# Patient Record
Sex: FEMALE | Race: WHITE | Employment: UNEMPLOYED | URBAN - METROPOLITAN AREA
[De-identification: names, ages, dates, MRNs, and addresses within clinical notes are randomized per-mention and may not be internally consistent; named-entity substitution may affect disease eponyms.]

---

## 2017-06-22 ENCOUNTER — OFFICE VISIT (OUTPATIENT)
Dept: PRIMARY CARE CLINIC | Age: 72
End: 2017-06-22

## 2017-06-22 VITALS
HEART RATE: 72 BPM | RESPIRATION RATE: 16 BRPM | OXYGEN SATURATION: 97 % | DIASTOLIC BLOOD PRESSURE: 90 MMHG | TEMPERATURE: 98.3 F | WEIGHT: 191 LBS | SYSTOLIC BLOOD PRESSURE: 150 MMHG | HEIGHT: 63 IN | BODY MASS INDEX: 33.84 KG/M2

## 2017-06-22 DIAGNOSIS — Z12.31 ENCOUNTER FOR SCREENING MAMMOGRAM FOR HIGH-RISK PATIENT: ICD-10-CM

## 2017-06-22 DIAGNOSIS — Z12.11 SCREEN FOR COLON CANCER: ICD-10-CM

## 2017-06-22 DIAGNOSIS — Z00.00 PHYSICAL EXAM: ICD-10-CM

## 2017-06-22 DIAGNOSIS — M81.0 OSTEOPOROSIS: ICD-10-CM

## 2017-06-22 DIAGNOSIS — M48.00 SPINAL STENOSIS, UNSPECIFIED SPINAL REGION: Primary | ICD-10-CM

## 2017-06-22 LAB
A/G RATIO: 2 (ref 1.1–2.2)
ALBUMIN SERPL-MCNC: 4.9 G/DL (ref 3.4–5)
ALP BLD-CCNC: 77 U/L (ref 40–129)
ALT SERPL-CCNC: 17 U/L (ref 10–40)
ANION GAP SERPL CALCULATED.3IONS-SCNC: 16 MMOL/L (ref 3–16)
AST SERPL-CCNC: 18 U/L (ref 15–37)
BASOPHILS ABSOLUTE: 0 K/UL (ref 0–0.2)
BASOPHILS RELATIVE PERCENT: 0.5 %
BILIRUB SERPL-MCNC: 0.5 MG/DL (ref 0–1)
BILIRUBIN URINE: NEGATIVE
BLOOD, URINE: NEGATIVE
BUN BLDV-MCNC: 17 MG/DL (ref 7–20)
CALCIUM SERPL-MCNC: 10.2 MG/DL (ref 8.3–10.6)
CHLORIDE BLD-SCNC: 97 MMOL/L (ref 99–110)
CHOLESTEROL, TOTAL: 200 MG/DL (ref 0–199)
CLARITY: CLEAR
CO2: 26 MMOL/L (ref 21–32)
COLOR: YELLOW
CREAT SERPL-MCNC: 0.5 MG/DL (ref 0.6–1.2)
EOSINOPHILS ABSOLUTE: 0.3 K/UL (ref 0–0.6)
EOSINOPHILS RELATIVE PERCENT: 4.7 %
GFR AFRICAN AMERICAN: >60
GFR NON-AFRICAN AMERICAN: >60
GLOBULIN: 2.5 G/DL
GLUCOSE BLD-MCNC: 90 MG/DL (ref 70–99)
GLUCOSE URINE: NEGATIVE MG/DL
HCT VFR BLD CALC: 41.6 % (ref 36–48)
HDLC SERPL-MCNC: 51 MG/DL (ref 40–60)
HEMOGLOBIN: 13.4 G/DL (ref 12–16)
HEPATITIS C ANTIBODY INTERPRETATION: NORMAL
KETONES, URINE: NEGATIVE MG/DL
LDL CHOLESTEROL CALCULATED: 122 MG/DL
LEUKOCYTE ESTERASE, URINE: NEGATIVE
LYMPHOCYTES ABSOLUTE: 1.7 K/UL (ref 1–5.1)
LYMPHOCYTES RELATIVE PERCENT: 26.3 %
MCH RBC QN AUTO: 29.3 PG (ref 26–34)
MCHC RBC AUTO-ENTMCNC: 32.2 G/DL (ref 31–36)
MCV RBC AUTO: 90.9 FL (ref 80–100)
MICROSCOPIC EXAMINATION: NORMAL
MONOCYTES ABSOLUTE: 0.4 K/UL (ref 0–1.3)
MONOCYTES RELATIVE PERCENT: 7 %
NEUTROPHILS ABSOLUTE: 3.9 K/UL (ref 1.7–7.7)
NEUTROPHILS RELATIVE PERCENT: 61.5 %
NITRITE, URINE: NEGATIVE
PDW BLD-RTO: 14 % (ref 12.4–15.4)
PH UA: 6.5
PLATELET # BLD: 276 K/UL (ref 135–450)
PMV BLD AUTO: 8.3 FL (ref 5–10.5)
POTASSIUM SERPL-SCNC: 5.2 MMOL/L (ref 3.5–5.1)
PROTEIN UA: NEGATIVE MG/DL
RBC # BLD: 4.57 M/UL (ref 4–5.2)
SODIUM BLD-SCNC: 139 MMOL/L (ref 136–145)
SPECIFIC GRAVITY UA: 1.01
TOTAL PROTEIN: 7.4 G/DL (ref 6.4–8.2)
TRIGL SERPL-MCNC: 135 MG/DL (ref 0–150)
TSH SERPL DL<=0.05 MIU/L-ACNC: 1.78 UIU/ML (ref 0.27–4.2)
URINE TYPE: NORMAL
UROBILINOGEN, URINE: 0.2 E.U./DL
VLDLC SERPL CALC-MCNC: 27 MG/DL
WBC # BLD: 6.4 K/UL (ref 4–11)

## 2017-06-22 PROCEDURE — 99214 OFFICE O/P EST MOD 30 MIN: CPT | Performed by: INTERNAL MEDICINE

## 2017-06-22 RX ORDER — TRAMADOL HYDROCHLORIDE 50 MG/1
50 TABLET ORAL EVERY 6 HOURS PRN
Qty: 20 TABLET | Refills: 0 | Status: SHIPPED | OUTPATIENT
Start: 2017-06-22

## 2017-06-22 ASSESSMENT — ENCOUNTER SYMPTOMS
GASTROINTESTINAL NEGATIVE: 1
BACK PAIN: 1
SHORTNESS OF BREATH: 0
CHEST TIGHTNESS: 0
WHEEZING: 0
EYES NEGATIVE: 1
ABDOMINAL PAIN: 0
COUGH: 0
ABDOMINAL DISTENTION: 0

## 2017-06-22 ASSESSMENT — PATIENT HEALTH QUESTIONNAIRE - PHQ9
2. FEELING DOWN, DEPRESSED OR HOPELESS: 0
SUM OF ALL RESPONSES TO PHQ QUESTIONS 1-9: 0
SUM OF ALL RESPONSES TO PHQ9 QUESTIONS 1 & 2: 0
1. LITTLE INTEREST OR PLEASURE IN DOING THINGS: 0

## 2017-06-23 LAB
ESTIMATED AVERAGE GLUCOSE: 111.2 MG/DL
HBA1C MFR BLD: 5.5 %

## 2017-08-04 DIAGNOSIS — Z12.11 SCREEN FOR COLON CANCER: ICD-10-CM

## 2017-08-04 LAB
CONTROL: NORMAL
HEMOCCULT STL QL: NEGATIVE

## 2017-08-04 PROCEDURE — 82274 ASSAY TEST FOR BLOOD FECAL: CPT | Performed by: INTERNAL MEDICINE

## 2017-12-13 ENCOUNTER — TELEPHONE (OUTPATIENT)
Dept: OTHER | Facility: CLINIC | Age: 72
End: 2017-12-13

## 2025-05-19 ENCOUNTER — HOSPITAL ENCOUNTER (INPATIENT)
Age: 80
LOS: 11 days | Discharge: NURSING, REHAB OR LONG-TERM CARE | End: 2025-05-30
Attending: EMERGENCY MEDICINE | Admitting: HOSPITALIST
Payer: MEDICARE

## 2025-05-19 ENCOUNTER — APPOINTMENT (OUTPATIENT)
Dept: CT IMAGING | Age: 80
End: 2025-05-19
Attending: INTERNAL MEDICINE
Payer: MEDICARE

## 2025-05-19 DIAGNOSIS — S16.1XXA ACUTE CERVICAL MYOFASCIAL STRAIN, INITIAL ENCOUNTER: ICD-10-CM

## 2025-05-19 DIAGNOSIS — I26.99 BILATERAL PULMONARY EMBOLISM (HCC): ICD-10-CM

## 2025-05-19 DIAGNOSIS — L98.429 SKIN ULCER OF SACRUM, UNSPECIFIED ULCER STAGE (HCC): ICD-10-CM

## 2025-05-19 DIAGNOSIS — R60.0 BILATERAL LEG EDEMA: ICD-10-CM

## 2025-05-19 DIAGNOSIS — I26.99 PULMONARY EMBOLISM WITHOUT ACUTE COR PULMONALE, UNSPECIFIED CHRONICITY, UNSPECIFIED PULMONARY EMBOLISM TYPE (HCC): ICD-10-CM

## 2025-05-19 DIAGNOSIS — I48.91 ATRIAL FIBRILLATION, UNSPECIFIED TYPE (HCC): ICD-10-CM

## 2025-05-19 DIAGNOSIS — S00.93XA CONTUSION OF HEAD, UNSPECIFIED PART OF HEAD, INITIAL ENCOUNTER: ICD-10-CM

## 2025-05-19 DIAGNOSIS — I48.91 ATRIAL FIBRILLATION WITH RVR (HCC): ICD-10-CM

## 2025-05-19 DIAGNOSIS — L89.159 PRESSURE INJURY OF SKIN OF SACRAL REGION, UNSPECIFIED INJURY STAGE: ICD-10-CM

## 2025-05-19 DIAGNOSIS — L03.319 CELLULITIS OF SACRAL REGION: Primary | ICD-10-CM

## 2025-05-19 DIAGNOSIS — E87.1 HYPONATREMIA: ICD-10-CM

## 2025-05-19 DIAGNOSIS — S32.000A COMPRESSION FRACTURE OF LUMBAR VERTEBRA, UNSPECIFIED LUMBAR VERTEBRAL LEVEL, INITIAL ENCOUNTER (HCC): ICD-10-CM

## 2025-05-19 DIAGNOSIS — I48.0 PAROXYSMAL ATRIAL FIBRILLATION (HCC): ICD-10-CM

## 2025-05-19 PROBLEM — S31.000A SACRAL WOUND, INITIAL ENCOUNTER: Status: ACTIVE | Noted: 2025-05-19

## 2025-05-19 LAB
ALBUMIN SERPL-MCNC: 3.2 G/DL (ref 3.4–5)
ALBUMIN/GLOB SERPL: 1.3 {RATIO} (ref 1.1–2.2)
ALP SERPL-CCNC: 80 U/L (ref 40–129)
ALT SERPL-CCNC: 6 U/L (ref 10–40)
ANION GAP SERPL CALCULATED.3IONS-SCNC: 11 MMOL/L (ref 3–16)
ANION GAP SERPL CALCULATED.3IONS-SCNC: 13 MMOL/L (ref 3–16)
ANTI-XA UNFRAC HEPARIN: <0.1 IU/ML (ref 0.3–0.7)
AST SERPL-CCNC: 14 U/L (ref 15–37)
BASE EXCESS BLDV CALC-SCNC: 0.7 MMOL/L
BASOPHILS # BLD: 0 K/UL (ref 0–0.2)
BASOPHILS NFR BLD: 0.1 %
BILIRUB SERPL-MCNC: 0.3 MG/DL (ref 0–1)
BILIRUB UR QL STRIP.AUTO: NEGATIVE
BUN SERPL-MCNC: 18 MG/DL (ref 7–20)
BUN SERPL-MCNC: 21 MG/DL (ref 7–20)
CALCIUM SERPL-MCNC: 9 MG/DL (ref 8.3–10.6)
CALCIUM SERPL-MCNC: 9.5 MG/DL (ref 8.3–10.6)
CHLORIDE SERPL-SCNC: 90 MMOL/L (ref 99–110)
CHLORIDE SERPL-SCNC: 92 MMOL/L (ref 99–110)
CK SERPL-CCNC: 64 U/L (ref 26–192)
CLARITY UR: CLEAR
CO2 BLDV-SCNC: 27 MMOL/L
CO2 SERPL-SCNC: 23 MMOL/L (ref 21–32)
CO2 SERPL-SCNC: 26 MMOL/L (ref 21–32)
COHGB MFR BLDV: 1.6 %
COLOR UR: YELLOW
CREAT SERPL-MCNC: 0.5 MG/DL (ref 0.6–1.2)
CREAT SERPL-MCNC: 0.5 MG/DL (ref 0.6–1.2)
DEPRECATED RDW RBC AUTO: 12.3 % (ref 12.4–15.4)
EKG DIAGNOSIS: NORMAL
EKG Q-T INTERVAL: 372 MS
EKG QRS DURATION: 78 MS
EKG QTC CALCULATION (BAZETT): 452 MS
EKG R AXIS: 57 DEGREES
EKG T AXIS: 69 DEGREES
EKG VENTRICULAR RATE: 89 BPM
EOSINOPHIL # BLD: 0 K/UL (ref 0–0.6)
EOSINOPHIL NFR BLD: 0.2 %
GFR SERPLBLD CREATININE-BSD FMLA CKD-EPI: >90 ML/MIN/{1.73_M2}
GFR SERPLBLD CREATININE-BSD FMLA CKD-EPI: >90 ML/MIN/{1.73_M2}
GLUCOSE SERPL-MCNC: 108 MG/DL (ref 70–99)
GLUCOSE SERPL-MCNC: 90 MG/DL (ref 70–99)
GLUCOSE UR STRIP.AUTO-MCNC: NEGATIVE MG/DL
HCO3 BLDV-SCNC: 26 MMOL/L (ref 23–29)
HCT VFR BLD AUTO: 29.1 % (ref 36–48)
HGB BLD-MCNC: 10.1 G/DL (ref 12–16)
HGB UR QL STRIP.AUTO: NEGATIVE
KETONES UR STRIP.AUTO-MCNC: NEGATIVE MG/DL
LACTATE BLDV-SCNC: 1 MMOL/L (ref 0.4–1.9)
LEUKOCYTE ESTERASE UR QL STRIP.AUTO: NEGATIVE
LIPASE SERPL-CCNC: 10 U/L (ref 13–60)
LYMPHOCYTES # BLD: 0.5 K/UL (ref 1–5.1)
LYMPHOCYTES NFR BLD: 4.4 %
MCH RBC QN AUTO: 33.4 PG (ref 26–34)
MCHC RBC AUTO-ENTMCNC: 34.6 G/DL (ref 31–36)
MCV RBC AUTO: 96.4 FL (ref 80–100)
METHGB MFR BLDV: 0.4 %
MONOCYTES # BLD: 0.4 K/UL (ref 0–1.3)
MONOCYTES NFR BLD: 3.9 %
NEUTROPHILS # BLD: 10.4 K/UL (ref 1.7–7.7)
NEUTROPHILS NFR BLD: 91.4 %
NITRITE UR QL STRIP.AUTO: NEGATIVE
O2 THERAPY: NORMAL
OSMOLALITY UR: 419 MOSM/KG (ref 390–1070)
PCO2 BLDV: 42.5 MMHG (ref 40–50)
PH BLDV: 7.39 [PH] (ref 7.35–7.45)
PH UR STRIP.AUTO: 6 [PH] (ref 5–8)
PLATELET # BLD AUTO: 313 K/UL (ref 135–450)
PMV BLD AUTO: 7.3 FL (ref 5–10.5)
PO2 BLDV: 42 MMHG
POTASSIUM SERPL-SCNC: 3.2 MMOL/L (ref 3.5–5.1)
POTASSIUM SERPL-SCNC: 3.6 MMOL/L (ref 3.5–5.1)
PROT SERPL-MCNC: 5.7 G/DL (ref 6.4–8.2)
PROT UR STRIP.AUTO-MCNC: NEGATIVE MG/DL
RBC # BLD AUTO: 3.02 M/UL (ref 4–5.2)
SAO2 % BLDV: 75 %
SODIUM SERPL-SCNC: 124 MMOL/L (ref 136–145)
SODIUM SERPL-SCNC: 131 MMOL/L (ref 136–145)
SODIUM UR-SCNC: 42 MMOL/L
SP GR UR STRIP.AUTO: 1.02 (ref 1–1.03)
TROPONIN, HIGH SENSITIVITY: 22 NG/L (ref 0–14)
TROPONIN, HIGH SENSITIVITY: 23 NG/L (ref 0–14)
UA COMPLETE W REFLEX CULTURE PNL UR: NORMAL
UA DIPSTICK W REFLEX MICRO PNL UR: NORMAL
URATE SERPL-MCNC: 2.8 MG/DL (ref 2.6–6)
URN SPEC COLLECT METH UR: NORMAL
UROBILINOGEN UR STRIP-ACNC: 1 E.U./DL
WBC # BLD AUTO: 11.3 K/UL (ref 4–11)

## 2025-05-19 PROCEDURE — 71260 CT THORAX DX C+: CPT

## 2025-05-19 PROCEDURE — 36415 COLL VENOUS BLD VENIPUNCTURE: CPT

## 2025-05-19 PROCEDURE — 99285 EMERGENCY DEPT VISIT HI MDM: CPT

## 2025-05-19 PROCEDURE — 84550 ASSAY OF BLOOD/URIC ACID: CPT

## 2025-05-19 PROCEDURE — 96365 THER/PROPH/DIAG IV INF INIT: CPT

## 2025-05-19 PROCEDURE — 93010 ELECTROCARDIOGRAM REPORT: CPT | Performed by: INTERNAL MEDICINE

## 2025-05-19 PROCEDURE — 6370000000 HC RX 637 (ALT 250 FOR IP): Performed by: HOSPITALIST

## 2025-05-19 PROCEDURE — 96367 TX/PROPH/DG ADDL SEQ IV INF: CPT

## 2025-05-19 PROCEDURE — 90471 IMMUNIZATION ADMIN: CPT | Performed by: EMERGENCY MEDICINE

## 2025-05-19 PROCEDURE — APPSS15 APP SPLIT SHARED TIME 0-15 MINUTES: Performed by: PHYSICIAN ASSISTANT

## 2025-05-19 PROCEDURE — 83605 ASSAY OF LACTIC ACID: CPT

## 2025-05-19 PROCEDURE — 2580000003 HC RX 258: Performed by: EMERGENCY MEDICINE

## 2025-05-19 PROCEDURE — 74177 CT ABD & PELVIS W/CONTRAST: CPT

## 2025-05-19 PROCEDURE — 82550 ASSAY OF CK (CPK): CPT

## 2025-05-19 PROCEDURE — 83935 ASSAY OF URINE OSMOLALITY: CPT

## 2025-05-19 PROCEDURE — 70450 CT HEAD/BRAIN W/O DYE: CPT

## 2025-05-19 PROCEDURE — 6360000002 HC RX W HCPCS: Performed by: EMERGENCY MEDICINE

## 2025-05-19 PROCEDURE — 81003 URINALYSIS AUTO W/O SCOPE: CPT

## 2025-05-19 PROCEDURE — 85025 COMPLETE CBC W/AUTO DIFF WBC: CPT

## 2025-05-19 PROCEDURE — 96375 TX/PRO/DX INJ NEW DRUG ADDON: CPT

## 2025-05-19 PROCEDURE — 1200000000 HC SEMI PRIVATE

## 2025-05-19 PROCEDURE — 82803 BLOOD GASES ANY COMBINATION: CPT

## 2025-05-19 PROCEDURE — 72125 CT NECK SPINE W/O DYE: CPT

## 2025-05-19 PROCEDURE — 90715 TDAP VACCINE 7 YRS/> IM: CPT | Performed by: EMERGENCY MEDICINE

## 2025-05-19 PROCEDURE — APPNB15 APP NON BILLABLE TIME 0-15 MINS: Performed by: PHYSICIAN ASSISTANT

## 2025-05-19 PROCEDURE — 83690 ASSAY OF LIPASE: CPT

## 2025-05-19 PROCEDURE — 93005 ELECTROCARDIOGRAM TRACING: CPT | Performed by: EMERGENCY MEDICINE

## 2025-05-19 PROCEDURE — 96372 THER/PROPH/DIAG INJ SC/IM: CPT

## 2025-05-19 PROCEDURE — 84484 ASSAY OF TROPONIN QUANT: CPT

## 2025-05-19 PROCEDURE — 80053 COMPREHEN METABOLIC PANEL: CPT

## 2025-05-19 PROCEDURE — 6360000004 HC RX CONTRAST MEDICATION: Performed by: EMERGENCY MEDICINE

## 2025-05-19 PROCEDURE — 2500000003 HC RX 250 WO HCPCS: Performed by: INTERNAL MEDICINE

## 2025-05-19 PROCEDURE — 85520 HEPARIN ASSAY: CPT

## 2025-05-19 PROCEDURE — 84300 ASSAY OF URINE SODIUM: CPT

## 2025-05-19 PROCEDURE — 87040 BLOOD CULTURE FOR BACTERIA: CPT

## 2025-05-19 RX ORDER — FENTANYL CITRATE 50 UG/ML
25 INJECTION, SOLUTION INTRAMUSCULAR; INTRAVENOUS ONCE
Refills: 0 | Status: COMPLETED | OUTPATIENT
Start: 2025-05-19 | End: 2025-05-19

## 2025-05-19 RX ORDER — IOPAMIDOL 755 MG/ML
75 INJECTION, SOLUTION INTRAVASCULAR
Status: COMPLETED | OUTPATIENT
Start: 2025-05-19 | End: 2025-05-19

## 2025-05-19 RX ORDER — POTASSIUM CHLORIDE 7.45 MG/ML
10 INJECTION INTRAVENOUS PRN
Status: DISCONTINUED | OUTPATIENT
Start: 2025-05-19 | End: 2025-05-30 | Stop reason: HOSPADM

## 2025-05-19 RX ORDER — SODIUM CHLORIDE 9 MG/ML
INJECTION, SOLUTION INTRAVENOUS CONTINUOUS
Status: DISCONTINUED | OUTPATIENT
Start: 2025-05-19 | End: 2025-05-19

## 2025-05-19 RX ORDER — SODIUM CHLORIDE 0.9 % (FLUSH) 0.9 %
5-40 SYRINGE (ML) INJECTION PRN
Status: DISCONTINUED | OUTPATIENT
Start: 2025-05-19 | End: 2025-05-27 | Stop reason: SDUPTHER

## 2025-05-19 RX ORDER — HEPARIN SODIUM 1000 [USP'U]/ML
80 INJECTION, SOLUTION INTRAVENOUS; SUBCUTANEOUS PRN
Status: DISCONTINUED | OUTPATIENT
Start: 2025-05-19 | End: 2025-05-19

## 2025-05-19 RX ORDER — MAGNESIUM SULFATE IN WATER 40 MG/ML
2000 INJECTION, SOLUTION INTRAVENOUS PRN
Status: DISCONTINUED | OUTPATIENT
Start: 2025-05-19 | End: 2025-05-30 | Stop reason: HOSPADM

## 2025-05-19 RX ORDER — IBUPROFEN 400 MG/1
400-800 TABLET, FILM COATED ORAL EVERY 6 HOURS PRN
COMMUNITY

## 2025-05-19 RX ORDER — POTASSIUM CHLORIDE 1500 MG/1
40 TABLET, EXTENDED RELEASE ORAL PRN
Status: DISCONTINUED | OUTPATIENT
Start: 2025-05-19 | End: 2025-05-30 | Stop reason: HOSPADM

## 2025-05-19 RX ORDER — HEPARIN SODIUM 10000 [USP'U]/100ML
0-4000 INJECTION, SOLUTION INTRAVENOUS CONTINUOUS
Status: DISCONTINUED | OUTPATIENT
Start: 2025-05-19 | End: 2025-05-20

## 2025-05-19 RX ORDER — HEPARIN SODIUM 10000 [USP'U]/100ML
5-30 INJECTION, SOLUTION INTRAVENOUS CONTINUOUS
Status: DISCONTINUED | OUTPATIENT
Start: 2025-05-19 | End: 2025-05-19

## 2025-05-19 RX ORDER — GABAPENTIN 100 MG/1
100 CAPSULE ORAL 3 TIMES DAILY
COMMUNITY

## 2025-05-19 RX ORDER — HEPARIN SODIUM 1000 [USP'U]/ML
40 INJECTION, SOLUTION INTRAVENOUS; SUBCUTANEOUS PRN
Status: DISCONTINUED | OUTPATIENT
Start: 2025-05-19 | End: 2025-05-19

## 2025-05-19 RX ORDER — SODIUM CHLORIDE 9 MG/ML
INJECTION, SOLUTION INTRAVENOUS PRN
Status: DISCONTINUED | OUTPATIENT
Start: 2025-05-19 | End: 2025-05-27 | Stop reason: SDUPTHER

## 2025-05-19 RX ORDER — ONDANSETRON 2 MG/ML
4 INJECTION INTRAMUSCULAR; INTRAVENOUS ONCE
Status: COMPLETED | OUTPATIENT
Start: 2025-05-19 | End: 2025-05-19

## 2025-05-19 RX ORDER — POLYETHYLENE GLYCOL 3350 17 G/17G
17 POWDER, FOR SOLUTION ORAL DAILY PRN
Status: DISCONTINUED | OUTPATIENT
Start: 2025-05-19 | End: 2025-05-30 | Stop reason: HOSPADM

## 2025-05-19 RX ORDER — HEPARIN SODIUM 1000 [USP'U]/ML
80 INJECTION, SOLUTION INTRAVENOUS; SUBCUTANEOUS ONCE
Status: COMPLETED | OUTPATIENT
Start: 2025-05-19 | End: 2025-05-19

## 2025-05-19 RX ORDER — OXYCODONE AND ACETAMINOPHEN 5; 325 MG/1; MG/1
1 TABLET ORAL EVERY 4 HOURS PRN
Refills: 0 | Status: DISCONTINUED | OUTPATIENT
Start: 2025-05-19 | End: 2025-05-24 | Stop reason: SDUPTHER

## 2025-05-19 RX ORDER — GABAPENTIN 100 MG/1
100 CAPSULE ORAL 3 TIMES DAILY
Status: DISCONTINUED | OUTPATIENT
Start: 2025-05-19 | End: 2025-05-30 | Stop reason: HOSPADM

## 2025-05-19 RX ORDER — ONDANSETRON 2 MG/ML
4 INJECTION INTRAMUSCULAR; INTRAVENOUS EVERY 6 HOURS PRN
Status: DISCONTINUED | OUTPATIENT
Start: 2025-05-19 | End: 2025-05-30 | Stop reason: HOSPADM

## 2025-05-19 RX ORDER — ONDANSETRON 4 MG/1
4 TABLET, ORALLY DISINTEGRATING ORAL EVERY 8 HOURS PRN
Status: DISCONTINUED | OUTPATIENT
Start: 2025-05-19 | End: 2025-05-30 | Stop reason: HOSPADM

## 2025-05-19 RX ORDER — ACETAMINOPHEN 325 MG/1
650 TABLET ORAL EVERY 6 HOURS PRN
Status: DISCONTINUED | OUTPATIENT
Start: 2025-05-19 | End: 2025-05-24

## 2025-05-19 RX ORDER — ACETAMINOPHEN 650 MG/1
650 SUPPOSITORY RECTAL EVERY 6 HOURS PRN
Status: DISCONTINUED | OUTPATIENT
Start: 2025-05-19 | End: 2025-05-24

## 2025-05-19 RX ORDER — DEXTROSE MONOHYDRATE, SODIUM CHLORIDE, AND POTASSIUM CHLORIDE 50; 1.49; 4.5 G/1000ML; G/1000ML; G/1000ML
INJECTION, SOLUTION INTRAVENOUS CONTINUOUS
Status: DISCONTINUED | OUTPATIENT
Start: 2025-05-19 | End: 2025-05-20

## 2025-05-19 RX ORDER — SODIUM CHLORIDE 0.9 % (FLUSH) 0.9 %
5-40 SYRINGE (ML) INJECTION EVERY 12 HOURS SCHEDULED
Status: DISCONTINUED | OUTPATIENT
Start: 2025-05-19 | End: 2025-05-27 | Stop reason: SDUPTHER

## 2025-05-19 RX ORDER — ENOXAPARIN SODIUM 100 MG/ML
40 INJECTION SUBCUTANEOUS DAILY
Status: DISCONTINUED | OUTPATIENT
Start: 2025-05-19 | End: 2025-05-19

## 2025-05-19 RX ADMIN — DEXTROSE, SODIUM CHLORIDE, AND POTASSIUM CHLORIDE: 5; .45; .15 INJECTION INTRAVENOUS at 17:52

## 2025-05-19 RX ADMIN — GABAPENTIN 100 MG: 100 CAPSULE ORAL at 17:26

## 2025-05-19 RX ADMIN — CEFEPIME 2000 MG: 2 INJECTION, POWDER, FOR SOLUTION INTRAVENOUS at 11:48

## 2025-05-19 RX ADMIN — HEPARIN SODIUM 5300 UNITS: 1000 INJECTION INTRAVENOUS; SUBCUTANEOUS at 17:24

## 2025-05-19 RX ADMIN — IOPAMIDOL 75 ML: 755 INJECTION, SOLUTION INTRAVENOUS at 13:06

## 2025-05-19 RX ADMIN — HEPARIN SODIUM AND DEXTROSE 1200 UNITS/HR: 10000; 5 INJECTION INTRAVENOUS at 17:24

## 2025-05-19 RX ADMIN — OXYCODONE AND ACETAMINOPHEN 1 TABLET: 5; 325 TABLET ORAL at 17:48

## 2025-05-19 RX ADMIN — TETANUS TOXOID, REDUCED DIPHTHERIA TOXOID AND ACELLULAR PERTUSSIS VACCINE, ADSORBED 0.5 ML: 5; 2.5; 8; 8; 2.5 SUSPENSION INTRAMUSCULAR at 11:42

## 2025-05-19 RX ADMIN — POTASSIUM CHLORIDE 40 MEQ: 1500 TABLET, EXTENDED RELEASE ORAL at 18:45

## 2025-05-19 RX ADMIN — SODIUM CHLORIDE: 0.9 INJECTION, SOLUTION INTRAVENOUS at 13:13

## 2025-05-19 RX ADMIN — VANCOMYCIN HYDROCHLORIDE 1000 MG: 1 INJECTION, POWDER, LYOPHILIZED, FOR SOLUTION INTRAVENOUS at 13:08

## 2025-05-19 RX ADMIN — FENTANYL CITRATE 25 MCG: 50 INJECTION INTRAMUSCULAR; INTRAVENOUS at 11:45

## 2025-05-19 RX ADMIN — ONDANSETRON 4 MG: 2 INJECTION, SOLUTION INTRAMUSCULAR; INTRAVENOUS at 11:45

## 2025-05-19 ASSESSMENT — PAIN SCALES - GENERAL
PAINLEVEL_OUTOF10: 10

## 2025-05-19 ASSESSMENT — PAIN DESCRIPTION - LOCATION: LOCATION: LEG

## 2025-05-19 ASSESSMENT — PAIN DESCRIPTION - ORIENTATION: ORIENTATION: RIGHT;LEFT

## 2025-05-19 NOTE — PROGRESS NOTES
Medication Reconciliation    List of medications patient is currently taking is complete.     Source of information: 1. Conversation with patient/family at bedside                                      2. EPIC records     Notes regarding home medications:   1. Patient's only routine maintenance medication is Gabapentin 100 mg po TID.  Patient takes Ibuprofen + Acetaminophen as needed.    Jermaine De Santiago RPH, PharmD, BCPS  5/19/2025 2:30 PM

## 2025-05-19 NOTE — CONSULTS
[facility]    Radha Carcamo  1945    May 19, 2025    Reason for Consult: Pulmonary Embolus    CC:    HPI:  The patient is 79 y.o. female with a past medical history significant for spinal stenosis who presented to [facility] with a sacral decubitus ulcer . I was asked to see her for the incidental finding of pulmonary emboli on her noncontrast chest CT.       Review of Systems:  Constitutional: No fatigue, weakness, night sweats or fever.   HEENT: No new vision difficulties or ringing in the ears.  Respiratory: No new SOB, PND, orthopnea or cough.   Cardiovascular: See HPI   GI: No n/v, diarrhea, constipation, abdominal pain or changes in bowel habits.  No melena, no hematochezia  : No urinary frequency, urgency, incontinence, hematuria or dysuria.  Skin: No cyanosis or skin lesions.  Musculoskeletal: No new muscle or joint pain.  Neurological: No syncope or TIA-like symptoms.  Psychiatric: No anxiety, insomnia or depression     Past Medical History:   Diagnosis Date    Spinal stenosis      Past Surgical History:   Procedure Laterality Date    EYE SURGERY      cataract ou     Family History   Problem Relation Age of Onset    Cancer Mother     Cancer Father      Social History     Tobacco Use    Smoking status: Former     Current packs/day: 1.00     Average packs/day: 1 pack/day for 10.0 years (10.0 ttl pk-yrs)     Types: Cigarettes    Smokeless tobacco: Never   Substance Use Topics    Alcohol use: Yes     Comment: occ    Drug use: No       Allergies   Allergen Reactions    Codeine Nausea And Vomiting     Current Facility-Administered Medications   Medication Dose Route Frequency Provider Last Rate Last Admin    0.9 % sodium chloride infusion   IntraVENous Continuous Yeison Morocho  mL/hr at 05/19/25 1313 New Bag at 05/19/25 1313    gabapentin (NEURONTIN) capsule 100 mg  100 mg Oral TID Karan Radford MD        heparin (porcine) injection 5,300 Units  80 Units/kg

## 2025-05-19 NOTE — PROGRESS NOTES
Clinical Pharmacy Note  Heparin Dosing Consult    Radha Carcamo is a 79 y.o. female ordered heparin per VTE/DVT/PE Nomogram by Dr. Morocho.     No results found for: \"ANTIXAUHEP\", \"LABHEPA\"   Lab Results   Component Value Date/Time    HGB 10.1 05/19/2025 11:35 AM    HCT 29.1 05/19/2025 11:35 AM     05/19/2025 11:35 AM       Ht Readings from Last 1 Encounters:   05/19/25 1.6 m (5' 2.99\")        Wt Readings from Last 1 Encounters:   05/19/25 65.9 kg (145 lb 4.5 oz)        Assessment/Plan:  Initial bolus: 5300 units  Initial infusion rate: 1200 units/hr  Next anti-Xa: 2200    Pharmacy will continue to monitor adjust heparin based on anti-Xa results using nomogram below:     VTE/DVT/PE Heparin Nomogram     Initial Bolus: 80 units/kg Max Bolus: 10,000 units       Initial Rate: 18 units/kg/hr Max Initial Rate: 2,100 units/hr     anti-Xa Bolus Titration   < 0.1 Heparin 80 units/kg bolus Increase drip by 4 units/kg/hr   0.1 - 0.29 Heparin 40 units/kg bolus Increase drip by 2 units/kg/hr   0.3 - 0.7 No Bolus No Change   0.71 - 0.8 No Bolus Decrease drip by 1 units/kg/hr   0.81 - 0.99 No Bolus Decrease drip by 2 units/kg/hr   > 1 Hold Heparin for 1 hour Decrease drip by 3 units/kg/hr       Obtain anti-Xa 6 hours after initial bolus and 6 hours after any dose change until two consecutive therapeutic anti-Xa levels are achieved - then daily.

## 2025-05-19 NOTE — ED PROVIDER NOTES
[facility] EMERGENCY DEPARTMENT  EMERGENCY DEPARTMENT ENCOUNTER      Pt Name: Radha Carcamo  MRN: [id number]  Birthdate 1945  Date of evaluation: 5/19/2025  Provider: MAREK ARREGUIN DO    CHIEF COMPLAINT       Chief Complaint   Patient presents with    Leg Pain     Patient to ER via EMS, she states she is her today because her boyfriend is no longer able to care for her. Bilat leg pain and swelling since November.          HISTORY OF PRESENT ILLNESS   (Location/Symptom, Timing/Onset, Context/Setting, Quality, Duration, Modifying Factors, Severity)  Note limiting factors.   Radha Carcamo is a 79 y.o. female who presents to the emergency department with a complaint that she is having severe pain in her presacral area.  She states that she has a \"sore\" that has been there for a while but has become more painful.  She reports drainage and odor from the site.    She reports that she lives at home alone with her boyfriend who is her caregiver.  She states that she has not been able to walk for approximately 6 months.  She is incontinent of urine at baseline and wears a depends.  She states that she has become increasingly generally weak over the last few months and her boyfriend is unable to care for her.  She has had several falls within the last week and he was unable to get her up off of the floor.  She had a fall last night in which she fell.  She did hit her head and sustained an abrasion to the occipital scalp.  She complains of some neck and some upper back pain.  She also complains of pain at the sacral decubitus site.    She also complains of some bilateral lower extremity edema which has gradually worsened.  Appetite has been poor.  She states that she has not been eating or drinking much.    She denies any chest pain heaviness pressure or tightness.  No diaphoresis.    She denies any abdominal pain nausea vomiting or diarrhea.  She denies any dysuria hematuria frequency

## 2025-05-19 NOTE — PROGRESS NOTES
4 Eyes Skin Assessment     NAME:  Radha Carcamo  YOB: 1945  MEDICAL RECORD NUMBER:  [id number]    The patient is being assessed for  Admission    I agree that at least one RN has performed a thorough Head to Toe Skin Assessment on the patient. ALL assessment sites listed below have been assessed.      Areas assessed by both nurses:    Head, Face, Ears, Shoulders, Back, Chest, Arms, Elbows, Hands, Sacrum. Buttock, Coccyx, Ischium, and Legs. Feet and Heels        Does the Patient have a Wound? Yes wound(s) were present on assessment. LDA wound assessment was Initiated and completed by RN       Dao Prevention initiated by RN: Yes  Wound Care Orders initiated by RN: Yes    Pressure Injury (Stage 3,4, Unstageable, DTI, NWPT, and Complex wounds) if present, place Wound referral order by RN under : Yes    New Ostomies, if present place, Ostomy referral order under : No     Nurse 1 eSignature: Electronically signed by Sharon Galvez RN on 5/19/25 at 6:25 PM EDT    **SHARE this note so that the co-signing nurse can place an eSignature**    Nurse 2 eSignature: Electronically signed by Trinity Millan RN on 5/19/25 at 6:41 PM EDT

## 2025-05-19 NOTE — CONSULTS
Patient ID: Radha Carcamo  Referring/ Physician: Karan Radford MD      Summary:   Radha Carcamo is being seen by nephrology for hyponatremia .     Reason for admission: sacral wound       Interval Hx:     Na 124 > 131 after fluids.   Uric acid 2.8     Assessment/Plan:   - change fluids to d5 1/2 NS 20 kcl at 75 cc/hr   - replace potassium   - urine sodium ordered, urine osm.   - strict IO.   - goal sodium should be around 130 today       Hyponatremia   Presenting with sodium of 124  Received IV fluids and sodium jacob to 131  Goal correction 4-6 meq in 24 hrs  Replace potassium     Hypokalemia   Replacing as needed.       Sacral ulcer  On antibiotics.         [facility] would like to thank you for the opportunity to serve this patient. Please call with any questions or concerns.    Hawa Curiel MD  [facility]  [address] [phone]  Fax: [phone]  Office: [phone]    [facility]  Radha Carcamo is a 79 y.o. female  with  has a past medical history of Spinal stenosis. who presented with sacral decubitus ulcer. .was found to have incidental finding of PE on chest CT. She was started on antibiotics and received IV fluids. Placed on heparin gtt. Nephrology consulted for hyponatremia with admitted sodium level of 124.      Review of Systems:   As above.     PMH/SH/FH:    Medical Hx: reviewed and updated as appropriate  Past Medical History:   Diagnosis Date    Spinal stenosis          Surgical Hx: reviewed and updated as appropriate   has a past surgical history that includes eye surgery.     Social Hx: reviewed and updated as appropriate  Social History     Tobacco Use    Smoking status: Former     Current packs/day: 1.00     Average packs/day: 1 pack/day for 10.0 years (10.0 ttl pk-yrs)     Types: Cigarettes    Smokeless tobacco: Never   Substance Use Topics    Alcohol use: Yes     Comment: occ        Family hx: reviewed and updated as

## 2025-05-19 NOTE — ACP (ADVANCE CARE PLANNING)
Advanced Care Planning Note.    Purpose of Encounter: Advanced care planning in light of acute and chronic deteriorating medical conditions.    Parties In Attendance: Patient (Radha Carcamo),   (POA), Karan Radford MD  (myself)    Decisional Capacity: Yes    Subjective: Patient/family understand in this voluntary conversation that Radha Carcamo continues to deteriorate and discuss what inteventions and plans patient would want implemented in light of the following diagnoses:      Acute PE  Decubitus ulcer     Objective: Pt has been having chronic decline in functional status with increased dependence on others for ADLs  Increased frequency of Hospitalizations.  Likelihood of further hospitalizations with likelihood of escalation of medical interventions with the expectation of returning to a diminishing baseline level of functionality.      Discussion highlights: I discussed with patient the ramifications of their acute and chronic medical problems- and the likely outcomes expected, both in terms of statistics, and as part of my experience seeing patients with similar conditions.      Goals of Care Determination:  Patient wishes to remain Full code for now, but has interest in continuing this conversation, and discussing with family before making any changes to code status and goals of care parameters.      Plan: Continue to educate patient on medical conditions and the choices available regarding possible outcomes with regard to goals of care and code status.         Time spent on Advanced care Plannin minutes    Karan Radford MD  2025 3:06 PM

## 2025-05-19 NOTE — CARE COORDINATION
Wound care consulted for sacral wound. Reviewed chart and imaging. Pt with unstageable wound to sacral area. General surgery consulted. Please defer to general surgery for all wound care orders and management. Will not continue to follow. Please re-consult if needed.   Electronically signed by Gabriela Barreto RN CWOCN on 5/19/2025 at 4:00 PM

## 2025-05-19 NOTE — CONSULTS
Surgery Consult Note     Addi Padron PA-C  Pt Name: Radha Carcamo  MRN: [id number]  YOB: 1945  Date of evaluation: 5/19/2025  Primary Care Physician: Niko Gunderson MD  Referred By: Karan Radford   Reason for Consultation: decubitus ulcer   Chief Complaint: pain in her lower back and leg pain.  IMPRESSIONS:   Unstageable sacral ulcer. +eschar  WBC count 11.3  Pulmonary emboli   PLANS:   Monitor and control pain  Bilateral lower extremity duplex  IVF  IV antibiotics   Keep pressure off ulcer  Apply santyl to ulcer  Will continue to monitor and perform bedside debridements as needed  SUBJECTIVE:   History of Chief Complaint:    Radha Carcamo is a 79 y.o. female who presented with bilateral lower extremity pain. A CT scan was done and that showed Pulmonary emboli are seen in the right lower lobar pulmonary artery extending to segmental branch and in a left upper lobe segmental branch pulmonary artery, and a sacral decubitus ulcer without visualized underlying osteomyelitis. The site have overlying eschar  and a small amount of malodorous drainage. She stated that this ulcer has been present for several months.   Past Medical History  Reviewed  has a past medical history of Spinal stenosis.  Past Surgical History  Reviewed has a past surgical history that includes eye surgery.  Medications  Prior to Admission medications    Medication Sig Start Date End Date Taking? Authorizing Provider   gabapentin (NEURONTIN) 100 MG capsule Take 1 capsule by mouth 3 times daily.   Yes Sindy Nichols MD   ibuprofen (ADVIL;MOTRIN) 400 MG tablet Take 1-2 tablets by mouth every 6 hours as needed for Pain or Fever   Yes Sindy Nichols MD    Scheduled Meds:   [START ON 5/20/2025] cefepime  1,000 mg IntraVENous Q12H    sodium chloride flush  5-40 mL IntraVENous 2 times per day    gabapentin  100 mg Oral TID    heparin (porcine)  80 Units/kg IntraVENous Once    [START ON 5/20/2025] vancomycin  1,500

## 2025-05-19 NOTE — ED NOTES
EMS stated patient fell yesterday, patient states she has not fallen in months. Patient Aox4 but impulsive and requires some redirection.

## 2025-05-19 NOTE — ED TRIAGE NOTES
Patient to ER via EMS, she states she is her today because her boyfriend is no longer able to care for her. Bilat leg pain and swelling since November.

## 2025-05-19 NOTE — PROGRESS NOTES
Patient arrivedto unit alert and oriented vitals stable. Patient transferred over to bed from [facility] with complaints of pain. Orders released, order for food placed with dietary. Patient oriented to room and provided call light.

## 2025-05-19 NOTE — H&P
V2.0  History and Physical      Name:  Radah Carcamo /Age/Sex: 1945  (79 y.o. female)   MRN & CSN:  [id number] & [id number] Encounter Date/Time: 2025 2:21 PM EDT   Location:   PCP: Niko Gunderson MD       Hospital Day: 1    Assessment and Plan:   Radha Carcamo is a 79 y.o. female with a pmh of  who presents with Sacral wound, initial encounter    Hospital Problems           Last Modified POA    * (Principal) Sacral wound, initial encounter 2025 Yes       Plan:        Admit inpatient  Telemetry monitering  Empiric IV antibiotics : IV vancomycin and cefepime  Pharmacy consult to dose vancomycin  Blood culture x 2  Wound culture   Will consult general surgery for evaluation of sacral decubitus ulcer  Wound care consult    Acute PE :    Acute pulmonary embolism  : Continue heparin drip, monitor PT/INR as per heparin protocol  Check bilateral lower extremity venous Doppler  Cardiology consulted  DuoNebs  Oxygen via nasal cannula as needed to keep oxygen saturation more than 90%       Hyponatremia :  Give IV fluid normal saline for now    Could be 2/2 SIADH vs poor solute intake  Check Arabella, Uosm  Moniter BMP      Replace K with PO and IV KCL per protocol  Moniter K and Mg       PT and OT evaluation      Continue home meds for other chronic conditions    DVT ppx  : lovenox    Diet : regular diet    Code status  : full code                Disposition:   Current Living situation: Home  Expected Disposition:TBD  Estimated D/C: 2- 3 days    Diet No diet orders on file   DVT Prophylaxis [] Lovenox, []  Heparin, [] SCDs, [] Ambulation,  [] Eliquis, [] Xarelto, [] Coumadin   Code Status No Order   Surrogate Decision Maker/ POA            History from:       patient      History of Present Illness:     Chief Complaint:   Radha Carcamo is a 79 y.o. female with pmh of  who presents with concern that her boyfriend is no longer able to care for her she is essentially nonambulatory uses a

## 2025-05-19 NOTE — PROGRESS NOTES
Clinical Pharmacy Note  Vancomycin Consult    Radha Carcamo is a 79 y.o. female ordered vancomycin for SSTI; consult received from Dr. Radford to manage therapy. Also receiving cefepime.    Allergies:  Codeine     Temp max:  Temp (24hrs), Av.4 °F (36.9 °C), Min:98.3 °F (36.8 °C), Max:98.4 °F (36.9 °C)      Recent Labs     25  1135   WBC 11.3*       Recent Labs     25  1135   BUN 21*   CREATININE 0.5*       No intake or output data in the 24 hours ending 25 1553    Culture Results:      Ht Readings from Last 1 Encounters:   25 1.6 m (5' 2.99\")        Wt Readings from Last 1 Encounters:   25 65.9 kg (145 lb 4.5 oz)         Estimated Creatinine Clearance: 85 mL/min (A) (based on SCr of 0.5 mg/dL (L)).    Assessment/Plan:  Day # 1 of vancomycin.  Vancomycin 1500 mg IV every 24 hours.    Goal -600  Predicted       Thank you for the consult.    Electronically signed by Jl Llamas RPH on 2025 at 3:54 PM

## 2025-05-19 NOTE — ED NOTES
ED TO INPATIENT SBAR HANDOFF    Patient Name: Radha Carcamo   Preferred Name: Radha  : 1945  79 y.o.   Family/Caregiver Present: no   Code Status Order: No Order  PO Status: NPO:No  Telemetry Order: Yes  C-SSRS: Risk of Suicide: No Risk  Sitter no     Restraints:     Sepsis Risk Score      Situation  Chief Complaint   Patient presents with    Leg Pain     Patient to ER via EMS, she states she is her today because her boyfriend is no longer able to care for her. Bilat leg pain and swelling since November.      Brief Description of Patient's Condition: From home with boyfriend, unable to ambulate/transfer independently. Chronic leg pain/swelling, fall yesterday. Came to ER stating her boyfriend is unable to care for her at home.  Mental Status: oriented and alert  Arrived from:Home  Imaging:   CT HEAD WO CONTRAST   Final Result   1. No acute intracranial abnormality.   2. Mild periventricular hypoattenuation, consistent with mild chronic small vessel ischemic changes.            CT CERVICAL SPINE WO CONTRAST   Final Result   No acute abnormality of the cervical spine.         CT CHEST ABDOMEN PELVIS W CONTRAST Additional Contrast? None   Preliminary Result   1. Pulmonary emboli are seen in the right lower lobar pulmonary artery   extending to segmental branch and in a left upper lobe segmental branch   pulmonary artery. No convincing evidence of right heart strain.   2. Prominent intrahepatic and extrahepatic bile ducts, likely representing   reservoir effect in the setting of prior cholecystectomy.  Consider   correlation with LFTs.   3. Mild superior endplate deformities of the T4, L2, and L5 vertebral bodies   are favored nonacute. Correlate with clinical symptoms.   4. Sacral decubitus ulcer without visualized underlying osteomyelitis.   5. Moderate body wall edema.   6. Note limited evaluation of the abdomen and pelvis due to motion artifact   despite repeat scan.         CT THORACIC SPINE BONY

## 2025-05-20 ENCOUNTER — ANESTHESIA (OUTPATIENT)
Dept: OPERATING ROOM | Age: 80
End: 2025-05-20
Payer: MEDICARE

## 2025-05-20 ENCOUNTER — HOSPITAL ENCOUNTER (INPATIENT)
Dept: VASCULAR LAB | Age: 80
Discharge: HOME | End: 2025-05-22
Attending: HOSPITALIST
Payer: MEDICARE

## 2025-05-20 ENCOUNTER — ANESTHESIA EVENT (OUTPATIENT)
Dept: OPERATING ROOM | Age: 80
End: 2025-05-20
Payer: MEDICARE

## 2025-05-20 PROBLEM — R60.0 BILATERAL LEG EDEMA: Status: ACTIVE | Noted: 2025-05-20

## 2025-05-20 PROBLEM — S16.1XXA ACUTE CERVICAL MYOFASCIAL STRAIN: Status: ACTIVE | Noted: 2025-05-20

## 2025-05-20 PROBLEM — L03.319 CELLULITIS OF SACRAL REGION: Status: ACTIVE | Noted: 2025-05-20

## 2025-05-20 PROBLEM — L98.429 SKIN ULCER OF SACRUM (HCC): Status: ACTIVE | Noted: 2025-05-20

## 2025-05-20 PROBLEM — L89.159 PRESSURE INJURY OF SKIN OF SACRAL REGION: Status: ACTIVE | Noted: 2025-05-20

## 2025-05-20 PROBLEM — I26.99 BILATERAL PULMONARY EMBOLISM (HCC): Status: ACTIVE | Noted: 2025-05-20

## 2025-05-20 PROBLEM — S32.000A COMPRESSION OF LUMBAR VERTEBRA (HCC): Status: ACTIVE | Noted: 2025-05-20

## 2025-05-20 PROBLEM — E87.1 HYPONATREMIA: Status: ACTIVE | Noted: 2025-05-20

## 2025-05-20 LAB
ANION GAP SERPL CALCULATED.3IONS-SCNC: 6 MMOL/L (ref 3–16)
ANION GAP SERPL CALCULATED.3IONS-SCNC: 6 MMOL/L (ref 3–16)
ANION GAP SERPL CALCULATED.3IONS-SCNC: 7 MMOL/L (ref 3–16)
ANION GAP SERPL CALCULATED.3IONS-SCNC: 7 MMOL/L (ref 3–16)
ANTI-XA UNFRAC HEPARIN: 0.24 IU/ML (ref 0.3–0.7)
ANTI-XA UNFRAC HEPARIN: 0.45 IU/ML (ref 0.3–0.7)
ANTI-XA UNFRAC HEPARIN: <0.1 IU/ML (ref 0.3–0.7)
BASOPHILS # BLD: 0 K/UL (ref 0–0.2)
BASOPHILS NFR BLD: 0.4 %
BUN SERPL-MCNC: 12 MG/DL (ref 7–20)
BUN SERPL-MCNC: 12 MG/DL (ref 7–20)
BUN SERPL-MCNC: 16 MG/DL (ref 7–20)
BUN SERPL-MCNC: 18 MG/DL (ref 7–20)
CALCIUM SERPL-MCNC: 8.6 MG/DL (ref 8.3–10.6)
CALCIUM SERPL-MCNC: 8.7 MG/DL (ref 8.3–10.6)
CHLORIDE SERPL-SCNC: 93 MMOL/L (ref 99–110)
CHLORIDE SERPL-SCNC: 95 MMOL/L (ref 99–110)
CO2 SERPL-SCNC: 24 MMOL/L (ref 21–32)
CREAT SERPL-MCNC: 0.3 MG/DL (ref 0.6–1.2)
CREAT SERPL-MCNC: 0.3 MG/DL (ref 0.6–1.2)
CREAT SERPL-MCNC: 0.4 MG/DL (ref 0.6–1.2)
CREAT SERPL-MCNC: 0.5 MG/DL (ref 0.6–1.2)
DEPRECATED RDW RBC AUTO: 12.5 % (ref 12.4–15.4)
EOSINOPHIL # BLD: 0.1 K/UL (ref 0–0.6)
EOSINOPHIL NFR BLD: 1.8 %
GFR SERPLBLD CREATININE-BSD FMLA CKD-EPI: >90 ML/MIN/{1.73_M2}
GLUCOSE SERPL-MCNC: 102 MG/DL (ref 70–99)
GLUCOSE SERPL-MCNC: 103 MG/DL (ref 70–99)
GLUCOSE SERPL-MCNC: 98 MG/DL (ref 70–99)
GLUCOSE SERPL-MCNC: 98 MG/DL (ref 70–99)
HCT VFR BLD AUTO: 25.4 % (ref 36–48)
HGB BLD-MCNC: 9 G/DL (ref 12–16)
LYMPHOCYTES # BLD: 0.7 K/UL (ref 1–5.1)
LYMPHOCYTES NFR BLD: 8.7 %
MCH RBC QN AUTO: 33.8 PG (ref 26–34)
MCHC RBC AUTO-ENTMCNC: 35.4 G/DL (ref 31–36)
MCV RBC AUTO: 95.5 FL (ref 80–100)
MONOCYTES # BLD: 0.4 K/UL (ref 0–1.3)
MONOCYTES NFR BLD: 4.8 %
NEUTROPHILS # BLD: 6.3 K/UL (ref 1.7–7.7)
NEUTROPHILS NFR BLD: 84.3 %
PLATELET # BLD AUTO: 265 K/UL (ref 135–450)
PMV BLD AUTO: 7.5 FL (ref 5–10.5)
POTASSIUM SERPL-SCNC: 3.6 MMOL/L (ref 3.5–5.1)
POTASSIUM SERPL-SCNC: 3.6 MMOL/L (ref 3.5–5.1)
POTASSIUM SERPL-SCNC: 3.9 MMOL/L (ref 3.5–5.1)
POTASSIUM SERPL-SCNC: 4 MMOL/L (ref 3.5–5.1)
RBC # BLD AUTO: 2.66 M/UL (ref 4–5.2)
SODIUM SERPL-SCNC: 124 MMOL/L (ref 136–145)
SODIUM SERPL-SCNC: 125 MMOL/L (ref 136–145)
SODIUM SERPL-SCNC: 125 MMOL/L (ref 136–145)
SODIUM SERPL-SCNC: 126 MMOL/L (ref 136–145)
WBC # BLD AUTO: 7.5 K/UL (ref 4–11)

## 2025-05-20 PROCEDURE — 87070 CULTURE OTHR SPECIMN AEROBIC: CPT

## 2025-05-20 PROCEDURE — 6360000002 HC RX W HCPCS: Performed by: ANESTHESIOLOGY

## 2025-05-20 PROCEDURE — 87075 CULTR BACTERIA EXCEPT BLOOD: CPT

## 2025-05-20 PROCEDURE — G0545 PR INHERENT VISIT TO INPT: HCPCS | Performed by: INTERNAL MEDICINE

## 2025-05-20 PROCEDURE — 2580000003 HC RX 258: Performed by: HOSPITALIST

## 2025-05-20 PROCEDURE — 6370000000 HC RX 637 (ALT 250 FOR IP): Performed by: SURGERY

## 2025-05-20 PROCEDURE — 36415 COLL VENOUS BLD VENIPUNCTURE: CPT

## 2025-05-20 PROCEDURE — 93970 EXTREMITY STUDY: CPT | Performed by: INTERNAL MEDICINE

## 2025-05-20 PROCEDURE — 6360000002 HC RX W HCPCS: Performed by: SURGERY

## 2025-05-20 PROCEDURE — 3700000001 HC ADD 15 MINUTES (ANESTHESIA): Performed by: SURGERY

## 2025-05-20 PROCEDURE — 99223 1ST HOSP IP/OBS HIGH 75: CPT | Performed by: INTERNAL MEDICINE

## 2025-05-20 PROCEDURE — 87205 SMEAR GRAM STAIN: CPT

## 2025-05-20 PROCEDURE — 86403 PARTICLE AGGLUT ANTBDY SCRN: CPT

## 2025-05-20 PROCEDURE — 7100000000 HC PACU RECOVERY - FIRST 15 MIN: Performed by: SURGERY

## 2025-05-20 PROCEDURE — 85520 HEPARIN ASSAY: CPT

## 2025-05-20 PROCEDURE — 87076 CULTURE ANAEROBE IDENT EACH: CPT

## 2025-05-20 PROCEDURE — 11043 DBRDMT MUSC&/FSCA 1ST 20/<: CPT | Performed by: SURGERY

## 2025-05-20 PROCEDURE — 51702 INSERT TEMP BLADDER CATH: CPT

## 2025-05-20 PROCEDURE — 80048 BASIC METABOLIC PNL TOTAL CA: CPT

## 2025-05-20 PROCEDURE — 2500000003 HC RX 250 WO HCPCS: Performed by: SURGERY

## 2025-05-20 PROCEDURE — 87077 CULTURE AEROBIC IDENTIFY: CPT

## 2025-05-20 PROCEDURE — 3600000012 HC SURGERY LEVEL 2 ADDTL 15MIN: Performed by: SURGERY

## 2025-05-20 PROCEDURE — 3600000002 HC SURGERY LEVEL 2 BASE: Performed by: SURGERY

## 2025-05-20 PROCEDURE — 87186 SC STD MICRODIL/AGAR DIL: CPT

## 2025-05-20 PROCEDURE — 94760 N-INVAS EAR/PLS OXIMETRY 1: CPT

## 2025-05-20 PROCEDURE — 2580000003 HC RX 258: Performed by: INTERNAL MEDICINE

## 2025-05-20 PROCEDURE — 11046 DBRDMT MUSC&/FSCA EA ADDL: CPT | Performed by: SURGERY

## 2025-05-20 PROCEDURE — 2060000000 HC ICU INTERMEDIATE R&B

## 2025-05-20 PROCEDURE — 6360000002 HC RX W HCPCS: Performed by: INTERNAL MEDICINE

## 2025-05-20 PROCEDURE — 6370000000 HC RX 637 (ALT 250 FOR IP): Performed by: HOSPITALIST

## 2025-05-20 PROCEDURE — 6360000002 HC RX W HCPCS: Performed by: HOSPITALIST

## 2025-05-20 PROCEDURE — 93970 EXTREMITY STUDY: CPT

## 2025-05-20 PROCEDURE — 0JB70ZZ EXCISION OF BACK SUBCUTANEOUS TISSUE AND FASCIA, OPEN APPROACH: ICD-10-PCS | Performed by: SURGERY

## 2025-05-20 PROCEDURE — 2500000003 HC RX 250 WO HCPCS

## 2025-05-20 PROCEDURE — 85025 COMPLETE CBC W/AUTO DIFF WBC: CPT

## 2025-05-20 PROCEDURE — 2580000003 HC RX 258: Performed by: SURGERY

## 2025-05-20 PROCEDURE — 6360000002 HC RX W HCPCS

## 2025-05-20 PROCEDURE — 7100000001 HC PACU RECOVERY - ADDTL 15 MIN: Performed by: SURGERY

## 2025-05-20 PROCEDURE — 6370000000 HC RX 637 (ALT 250 FOR IP): Performed by: PHYSICIAN ASSISTANT

## 2025-05-20 PROCEDURE — 2709999900 HC NON-CHARGEABLE SUPPLY: Performed by: SURGERY

## 2025-05-20 PROCEDURE — 3700000000 HC ANESTHESIA ATTENDED CARE: Performed by: SURGERY

## 2025-05-20 PROCEDURE — 99232 SBSQ HOSP IP/OBS MODERATE 35: CPT | Performed by: SURGERY

## 2025-05-20 RX ORDER — HALOPERIDOL 5 MG/ML
1 INJECTION INTRAMUSCULAR
Status: DISCONTINUED | OUTPATIENT
Start: 2025-05-20 | End: 2025-05-20 | Stop reason: HOSPADM

## 2025-05-20 RX ORDER — LORAZEPAM 2 MG/ML
0.5 INJECTION INTRAMUSCULAR
Status: DISCONTINUED | OUTPATIENT
Start: 2025-05-20 | End: 2025-05-20 | Stop reason: HOSPADM

## 2025-05-20 RX ORDER — MORPHINE SULFATE 2 MG/ML
2 INJECTION, SOLUTION INTRAMUSCULAR; INTRAVENOUS
Status: DISCONTINUED | OUTPATIENT
Start: 2025-05-20 | End: 2025-05-20

## 2025-05-20 RX ORDER — HEPARIN SODIUM 1000 [USP'U]/ML
2600 INJECTION, SOLUTION INTRAVENOUS; SUBCUTANEOUS ONCE
Status: COMPLETED | OUTPATIENT
Start: 2025-05-20 | End: 2025-05-20

## 2025-05-20 RX ORDER — MAGNESIUM HYDROXIDE 1200 MG/15ML
LIQUID ORAL CONTINUOUS PRN
Status: DISCONTINUED | OUTPATIENT
Start: 2025-05-20 | End: 2025-05-20 | Stop reason: HOSPADM

## 2025-05-20 RX ORDER — HEPARIN SODIUM 10000 [USP'U]/100ML
0-4000 INJECTION, SOLUTION INTRAVENOUS CONTINUOUS
Status: DISCONTINUED | OUTPATIENT
Start: 2025-05-20 | End: 2025-05-20

## 2025-05-20 RX ORDER — PROPOFOL 10 MG/ML
INJECTION, EMULSION INTRAVENOUS
Status: DISCONTINUED | OUTPATIENT
Start: 2025-05-20 | End: 2025-05-20 | Stop reason: SDUPTHER

## 2025-05-20 RX ORDER — SODIUM CHLORIDE 0.9 % (FLUSH) 0.9 %
5-40 SYRINGE (ML) INJECTION PRN
Status: DISCONTINUED | OUTPATIENT
Start: 2025-05-20 | End: 2025-05-20 | Stop reason: HOSPADM

## 2025-05-20 RX ORDER — MORPHINE SULFATE 4 MG/ML
4 INJECTION, SOLUTION INTRAMUSCULAR; INTRAVENOUS
Status: DISCONTINUED | OUTPATIENT
Start: 2025-05-20 | End: 2025-05-20

## 2025-05-20 RX ORDER — NALOXONE HYDROCHLORIDE 0.4 MG/ML
INJECTION, SOLUTION INTRAMUSCULAR; INTRAVENOUS; SUBCUTANEOUS PRN
Status: DISCONTINUED | OUTPATIENT
Start: 2025-05-20 | End: 2025-05-20 | Stop reason: HOSPADM

## 2025-05-20 RX ORDER — DEXMEDETOMIDINE HYDROCHLORIDE 100 UG/ML
INJECTION, SOLUTION INTRAVENOUS
Status: DISCONTINUED | OUTPATIENT
Start: 2025-05-20 | End: 2025-05-20 | Stop reason: SDUPTHER

## 2025-05-20 RX ORDER — SODIUM CHLORIDE 9 MG/ML
INJECTION, SOLUTION INTRAVENOUS PRN
Status: DISCONTINUED | OUTPATIENT
Start: 2025-05-20 | End: 2025-05-20 | Stop reason: HOSPADM

## 2025-05-20 RX ORDER — BUPIVACAINE HYDROCHLORIDE 5 MG/ML
INJECTION, SOLUTION EPIDURAL; INTRACAUDAL; PERINEURAL
Status: DISCONTINUED | OUTPATIENT
Start: 2025-05-20 | End: 2025-05-20 | Stop reason: ALTCHOICE

## 2025-05-20 RX ORDER — SODIUM CHLORIDE 9 MG/ML
INJECTION, SOLUTION INTRAVENOUS CONTINUOUS
Status: DISCONTINUED | OUTPATIENT
Start: 2025-05-20 | End: 2025-05-21

## 2025-05-20 RX ORDER — ONDANSETRON 2 MG/ML
INJECTION INTRAMUSCULAR; INTRAVENOUS
Status: DISCONTINUED | OUTPATIENT
Start: 2025-05-20 | End: 2025-05-20 | Stop reason: SDUPTHER

## 2025-05-20 RX ORDER — FENTANYL CITRATE 50 UG/ML
INJECTION, SOLUTION INTRAMUSCULAR; INTRAVENOUS
Status: DISCONTINUED | OUTPATIENT
Start: 2025-05-20 | End: 2025-05-20 | Stop reason: SDUPTHER

## 2025-05-20 RX ORDER — MEPERIDINE HYDROCHLORIDE 25 MG/ML
12.5 INJECTION INTRAMUSCULAR; INTRAVENOUS; SUBCUTANEOUS
Status: DISCONTINUED | OUTPATIENT
Start: 2025-05-20 | End: 2025-05-20 | Stop reason: HOSPADM

## 2025-05-20 RX ORDER — SODIUM CHLORIDE 0.9 % (FLUSH) 0.9 %
5-40 SYRINGE (ML) INJECTION EVERY 12 HOURS SCHEDULED
Status: DISCONTINUED | OUTPATIENT
Start: 2025-05-20 | End: 2025-05-20 | Stop reason: HOSPADM

## 2025-05-20 RX ORDER — FENTANYL CITRATE 50 UG/ML
50 INJECTION, SOLUTION INTRAMUSCULAR; INTRAVENOUS EVERY 5 MIN PRN
Status: DISCONTINUED | OUTPATIENT
Start: 2025-05-20 | End: 2025-05-20 | Stop reason: HOSPADM

## 2025-05-20 RX ORDER — LIDOCAINE HYDROCHLORIDE 20 MG/ML
INJECTION, SOLUTION EPIDURAL; INFILTRATION; INTRACAUDAL; PERINEURAL
Status: DISCONTINUED | OUTPATIENT
Start: 2025-05-20 | End: 2025-05-20 | Stop reason: SDUPTHER

## 2025-05-20 RX ORDER — DIPHENHYDRAMINE HYDROCHLORIDE 50 MG/ML
12.5 INJECTION, SOLUTION INTRAMUSCULAR; INTRAVENOUS
Status: DISCONTINUED | OUTPATIENT
Start: 2025-05-20 | End: 2025-05-20 | Stop reason: HOSPADM

## 2025-05-20 RX ORDER — ROCURONIUM BROMIDE 10 MG/ML
INJECTION, SOLUTION INTRAVENOUS
Status: DISCONTINUED | OUTPATIENT
Start: 2025-05-20 | End: 2025-05-20 | Stop reason: SDUPTHER

## 2025-05-20 RX ORDER — ONDANSETRON 2 MG/ML
4 INJECTION INTRAMUSCULAR; INTRAVENOUS
Status: COMPLETED | OUTPATIENT
Start: 2025-05-20 | End: 2025-05-20

## 2025-05-20 RX ORDER — MORPHINE SULFATE 2 MG/ML
2 INJECTION, SOLUTION INTRAMUSCULAR; INTRAVENOUS EVERY 4 HOURS PRN
Refills: 0 | Status: DISCONTINUED | OUTPATIENT
Start: 2025-05-20 | End: 2025-05-20

## 2025-05-20 RX ORDER — PHENYLEPHRINE HCL IN 0.9% NACL 1 MG/10 ML
SYRINGE (ML) INTRAVENOUS
Status: DISCONTINUED | OUTPATIENT
Start: 2025-05-20 | End: 2025-05-20 | Stop reason: SDUPTHER

## 2025-05-20 RX ORDER — HEPARIN SODIUM 10000 [USP'U]/100ML
0-4000 INJECTION, SOLUTION INTRAVENOUS CONTINUOUS
Status: DISCONTINUED | OUTPATIENT
Start: 2025-05-21 | End: 2025-05-22

## 2025-05-20 RX ORDER — ACETAMINOPHEN 500 MG
1000 TABLET ORAL EVERY 8 HOURS SCHEDULED
Status: DISCONTINUED | OUTPATIENT
Start: 2025-05-20 | End: 2025-05-30 | Stop reason: HOSPADM

## 2025-05-20 RX ORDER — METRONIDAZOLE 500 MG/100ML
500 INJECTION, SOLUTION INTRAVENOUS EVERY 8 HOURS
Status: DISCONTINUED | OUTPATIENT
Start: 2025-05-20 | End: 2025-05-27 | Stop reason: ALTCHOICE

## 2025-05-20 RX ADMIN — FENTANYL CITRATE 25 MCG: 50 INJECTION INTRAMUSCULAR; INTRAVENOUS at 16:40

## 2025-05-20 RX ADMIN — FENTANYL CITRATE 50 MCG: 50 INJECTION INTRAMUSCULAR; INTRAVENOUS at 16:17

## 2025-05-20 RX ADMIN — CEFEPIME 1000 MG: 1 INJECTION, POWDER, FOR SOLUTION INTRAMUSCULAR; INTRAVENOUS at 00:29

## 2025-05-20 RX ADMIN — Medication 100 MCG: at 16:43

## 2025-05-20 RX ADMIN — Medication: at 23:34

## 2025-05-20 RX ADMIN — Medication 150 MCG: at 16:55

## 2025-05-20 RX ADMIN — Medication: at 08:21

## 2025-05-20 RX ADMIN — OXYCODONE AND ACETAMINOPHEN 1 TABLET: 5; 325 TABLET ORAL at 11:21

## 2025-05-20 RX ADMIN — Medication 100 MCG: at 16:26

## 2025-05-20 RX ADMIN — SODIUM CHLORIDE 1500 MG: 0.9 INJECTION, SOLUTION INTRAVENOUS at 06:35

## 2025-05-20 RX ADMIN — ONDANSETRON 4 MG: 2 INJECTION INTRAMUSCULAR; INTRAVENOUS at 16:13

## 2025-05-20 RX ADMIN — MORPHINE SULFATE 4 MG: 4 INJECTION, SOLUTION INTRAMUSCULAR; INTRAVENOUS at 12:26

## 2025-05-20 RX ADMIN — GABAPENTIN 100 MG: 100 CAPSULE ORAL at 23:32

## 2025-05-20 RX ADMIN — METRONIDAZOLE 500 MG: 500 INJECTION, SOLUTION INTRAVENOUS at 16:29

## 2025-05-20 RX ADMIN — SUGAMMADEX 200 MG: 100 INJECTION, SOLUTION INTRAVENOUS at 16:58

## 2025-05-20 RX ADMIN — DEXMEDETOMIDINE HYDROCHLORIDE 2 MCG: 100 INJECTION, SOLUTION INTRAVENOUS at 16:32

## 2025-05-20 RX ADMIN — MORPHINE SULFATE 2 MG: 2 INJECTION, SOLUTION INTRAMUSCULAR; INTRAVENOUS at 08:58

## 2025-05-20 RX ADMIN — PROPOFOL 130 MG: 10 INJECTION, EMULSION INTRAVENOUS at 16:17

## 2025-05-20 RX ADMIN — ACETAMINOPHEN 1000 MG: 500 TABLET ORAL at 23:22

## 2025-05-20 RX ADMIN — SODIUM CHLORIDE: 0.9 INJECTION, SOLUTION INTRAVENOUS at 08:20

## 2025-05-20 RX ADMIN — CEFEPIME 2000 MG: 2 INJECTION, POWDER, FOR SOLUTION INTRAVENOUS at 23:53

## 2025-05-20 RX ADMIN — LIDOCAINE HYDROCHLORIDE 60 MG: 20 INJECTION, SOLUTION EPIDURAL; INFILTRATION; INTRACAUDAL; PERINEURAL at 16:17

## 2025-05-20 RX ADMIN — ONDANSETRON 4 MG: 2 INJECTION, SOLUTION INTRAMUSCULAR; INTRAVENOUS at 17:45

## 2025-05-20 RX ADMIN — DEXMEDETOMIDINE HYDROCHLORIDE 2 MCG: 100 INJECTION, SOLUTION INTRAVENOUS at 16:35

## 2025-05-20 RX ADMIN — OXYCODONE AND ACETAMINOPHEN 1 TABLET: 5; 325 TABLET ORAL at 06:35

## 2025-05-20 RX ADMIN — DEXMEDETOMIDINE HYDROCHLORIDE 4 MCG: 100 INJECTION, SOLUTION INTRAVENOUS at 16:36

## 2025-05-20 RX ADMIN — FENTANYL CITRATE 25 MCG: 50 INJECTION INTRAMUSCULAR; INTRAVENOUS at 16:38

## 2025-05-20 RX ADMIN — HEPARIN SODIUM 2600 UNITS: 1000 INJECTION INTRAVENOUS; SUBCUTANEOUS at 07:29

## 2025-05-20 RX ADMIN — CEFEPIME 1000 MG: 1 INJECTION, POWDER, FOR SOLUTION INTRAMUSCULAR; INTRAVENOUS at 11:27

## 2025-05-20 RX ADMIN — ROCURONIUM BROMIDE 50 MG: 10 SOLUTION INTRAVENOUS at 16:17

## 2025-05-20 RX ADMIN — DEXMEDETOMIDINE HYDROCHLORIDE 4 MCG: 100 INJECTION, SOLUTION INTRAVENOUS at 16:40

## 2025-05-20 RX ADMIN — Medication 100 MCG: at 16:49

## 2025-05-20 RX ADMIN — DEXMEDETOMIDINE HYDROCHLORIDE 4 MCG: 100 INJECTION, SOLUTION INTRAVENOUS at 16:38

## 2025-05-20 RX ADMIN — ACETAMINOPHEN 650 MG: 325 TABLET ORAL at 00:29

## 2025-05-20 RX ADMIN — Medication: at 01:19

## 2025-05-20 RX ADMIN — ONDANSETRON 4 MG: 2 INJECTION, SOLUTION INTRAMUSCULAR; INTRAVENOUS at 18:36

## 2025-05-20 ASSESSMENT — PAIN DESCRIPTION - ONSET
ONSET: ON-GOING

## 2025-05-20 ASSESSMENT — PAIN DESCRIPTION - LOCATION
LOCATION: LEG;SACRUM
LOCATION: LEG;SACRUM
LOCATION: LEG
LOCATION: LEG;SACRUM
LOCATION: BUTTOCKS
LOCATION: LEG;SACRUM

## 2025-05-20 ASSESSMENT — PAIN DESCRIPTION - FREQUENCY
FREQUENCY: CONTINUOUS

## 2025-05-20 ASSESSMENT — PAIN - FUNCTIONAL ASSESSMENT
PAIN_FUNCTIONAL_ASSESSMENT: NONE - DENIES PAIN
PAIN_FUNCTIONAL_ASSESSMENT: PREVENTS OR INTERFERES SOME ACTIVE ACTIVITIES AND ADLS
PAIN_FUNCTIONAL_ASSESSMENT: NONE - DENIES PAIN
PAIN_FUNCTIONAL_ASSESSMENT: PREVENTS OR INTERFERES SOME ACTIVE ACTIVITIES AND ADLS
PAIN_FUNCTIONAL_ASSESSMENT: 0-10

## 2025-05-20 ASSESSMENT — PAIN DESCRIPTION - DESCRIPTORS
DESCRIPTORS: ACHING
DESCRIPTORS: ACHING;SPASM;SHOOTING
DESCRIPTORS: ACHING

## 2025-05-20 ASSESSMENT — PAIN DESCRIPTION - ORIENTATION
ORIENTATION: RIGHT
ORIENTATION: LEFT;RIGHT
ORIENTATION: MID
ORIENTATION: RIGHT

## 2025-05-20 ASSESSMENT — PAIN SCALES - GENERAL
PAINLEVEL_OUTOF10: 0
PAINLEVEL_OUTOF10: 10
PAINLEVEL_OUTOF10: 10
PAINLEVEL_OUTOF10: 0
PAINLEVEL_OUTOF10: 0
PAINLEVEL_OUTOF10: 3
PAINLEVEL_OUTOF10: 6
PAINLEVEL_OUTOF10: 10
PAINLEVEL_OUTOF10: 8

## 2025-05-20 ASSESSMENT — PAIN DESCRIPTION - PAIN TYPE
TYPE: ACUTE PAIN

## 2025-05-20 ASSESSMENT — LIFESTYLE VARIABLES: SMOKING_STATUS: 0

## 2025-05-20 NOTE — ANESTHESIA PRE PROCEDURE
Department of Anesthesiology  Preprocedure Note       Name:  Radha Carcamo   Age:  79 y.o.  :  1945                                          MRN:  [id number]         Date:  2025      Surgeon: Surgeon(s):  Milo Champion MD    Procedure: Procedure(s):  EXCISIONAL DEBRIDEMENT SACRAL ULCER    Medications prior to admission:   Prior to Admission medications    Medication Sig Start Date End Date Taking? Authorizing Provider   gabapentin (NEURONTIN) 100 MG capsule Take 1 capsule by mouth 3 times daily.   Yes ProviderSindy MD   ibuprofen (ADVIL;MOTRIN) 400 MG tablet Take 1-2 tablets by mouth every 6 hours as needed for Pain or Fever   Yes Provider, MD Sindy       Current medications:    Current Facility-Administered Medications   Medication Dose Route Frequency Provider Last Rate Last Admin    0.9 % sodium chloride infusion   IntraVENous Continuous Hawa Curiel MD 75 mL/hr at 25 0820 New Bag at 25 0820    [START ON 2025] ceFEPIme (MAXIPIME) 2,000 mg in sodium chloride 0.9 % 100 mL IVPB (addEASE)  2,000 mg IntraVENous Q12H Karan Radford MD        morphine (PF) injection 2 mg  2 mg IntraVENous Q2H PRN Milo Champion MD        Or    morphine (PF) injection 4 mg  4 mg IntraVENous Q2H PRN Milo Champion MD   4 mg at 25 1226    metroNIDAZOLE (FLAGYL) 500 mg in 0.9% NaCl 100 mL IVPB premix  500 mg IntraVENous Q8H Christopher Negrete MD        sodium chloride flush 0.9 % injection 5-40 mL  5-40 mL IntraVENous 2 times per day Karan Radford MD        sodium chloride flush 0.9 % injection 5-40 mL  5-40 mL IntraVENous PRN Karan Radford MD        0.9 % sodium chloride infusion   IntraVENous PRN Karan Radford MD        potassium chloride (KLOR-CON M) extended release tablet 40 mEq  40 mEq Oral PRN Karan Radford MD   40 mEq at 25 1845    Or    potassium bicarb-citric acid (EFFER-K) effervescent tablet 40 mEq  40

## 2025-05-20 NOTE — PROGRESS NOTES
Physical Therapy    PT attempted but pt lethargic from morphine and wound to be debrided. PT to attempt at later time/date per POC as able.  Beba Montgomery, DPT

## 2025-05-20 NOTE — PROGRESS NOTES
General Surgery  Daily Progress Note    Pt Name: Radha Carcamo  Medical Record Number: [id number]  Date of Birth 1945   Today's Date: 5/20/2025    Chief Complaint   Patient presents with    Leg Pain     Patient to ER via EMS, she states she is her today because her boyfriend is no longer able to care for her. Bilat leg pain and swelling since November.        ASSESSMENT/PLAN  Unstageable sacral ulcer - on vancomycin, cefepime, flagyl.  Will proceed to the OR for excisional debridement of her sacral ulcer.  The risks, benefits, and alternatives were discussed with the patient and she is willing to consent for the procedure.  PE - heparin gtt on hold.  Will resume 6 hours after surgery      SUBJECTIVE  Radha is unchanged from yesterday. Pain is not well controlled. She has no nausea and no vomiting. She is tolerating a dysphagia diet but is currently NPO. Current activity is ad teri    OBJECTIVE  VITALS:  height is 1.6 m (5' 2.99\") and weight is 66 kg (145 lb 8.1 oz). Her oral temperature is 99 °F (37.2 °C). Her blood pressure is 156/112 (abnormal) and her pulse is 104 (abnormal). Her respiration is 16 and oxygen saturation is 92%.   GENERAL: alert, no distress  LUNGS: normal respiratory effort, no accessory muscle use  HEART: normal rate and regular rhythm  ABDOMEN: soft, non-tender and non-distended  Sacral ulcer approximately 4 x 5 cm with tunneling and discoloration superiorly, with malodorous fluid  EXTREMITY: no cyanosis and no clubbing  I/O last 3 completed shifts:  In: -   Out: 850 [Urine:850]  I/O this shift:  In: 0   Out: 750 [Urine:750]    LABS  Recent Labs     05/19/25  1135 05/19/25  1222 05/19/25  1543 05/20/25  0602   WBC 11.3*  --   --  7.5   HGB 10.1*  --   --  9.0*   HCT 29.1*  --   --  25.4*     --   --  265   *  --    < > 126*   K 3.6  --    < > 4.0   CL 90*  --    < > 95*   CO2 23  --    < > 24   BUN 21*  --    < > 16   CREATININE 0.5*  --    < > 0.4*   CALCIUM 9.5  --

## 2025-05-20 NOTE — BRIEF OP NOTE
Brief Postoperative Note      Patient: Radha Carcamo  YOB: 1945  MRN: [id number]    Date of Procedure: 5/20/2025    Pre-Op Diagnosis Codes:      * Skin ulcer of sacrum, unspecified ulcer stage (HCC) [L98.429]    Post-Op Diagnosis: stage IV sacral ulcer       Procedure(s):  EXCISIONAL DEBRIDEMENT OF SKIN, SUBCUTANEOUS TISSUE, FASCIA MEASURING 4 x 8.5 CM SACRAL ULCER    Surgeon(s):  Milo Champion MD    Assistant:  Surgical Assistant: Paras Walters RN    Anesthesia: General    Estimated Blood Loss (mL): less than 50     Complications: None    Specimens:   ID Type Source Tests Collected by Time Destination   1 : 1. Sacral ulcer Specimen Sacrum CULTURE, SURGICAL Milo Champion MD 5/20/2025 1631        Implants:  * No implants in log *      Drains:   Urinary Catheter 05/19/25 Keenan (Active)   $ Urethral catheter insertion $ Not inserted for procedure 05/20/25 0502   Catheter Indications Urinary retention (acute or chronic), continuous bladder irrigation or bladder outlet obstruction 05/20/25 0822   Site Assessment Pink 05/20/25 0822   Urine Color Yellow 05/20/25 0822   Urine Appearance Hazy 05/20/25 0822   Urine Odor Malodorous 05/20/25 0822   Collection Container Standard 05/20/25 0822   Securement Method Securing device (Describe) 05/20/25 0822   Catheter Care  Perineal wipes 05/20/25 0502   Catheter Best Practices  Drainage tube clipped to bed;Catheter secured to thigh;Tamper seal intact;Bag below bladder;Bag not on floor;Lack of dependent loop in tubing;Drainage bag less than half full 05/20/25 0822   Status Draining;Patent 05/20/25 0822   Output (mL) 750 mL 05/20/25 1400       Findings:  Infection Present At Time Of Surgery (PATOS) (choose all levels that have infection present):  - Deep Infection (muscle/fascia) present as evidenced by fluid consistent with infection  Other Findings: malodorous necrosis of skin, subcutaneous tissue, and fascia overlying

## 2025-05-20 NOTE — PROGRESS NOTES
Clinical Pharmacy Note  Renal Dose Adjustment    Radha Carcamo is receiving cefepime for SSTI.  This medication is renally eliminated.  Based on the patient's Estimated Creatinine Clearance: 106 mL/min (A) (based on SCr of 0.4 mg/dL (L)). and urine output, the dose has been adjusted to cefepime 2 g Q12H per protocol.    Pharmacy will continue to monitor and adjust dose as needed for changes in renal function.      Isabel Marr [facility], PharmD, 5/20/2025 12:00 PM

## 2025-05-20 NOTE — PROGRESS NOTES
Surgical consent reviewed with pt. She is agreeable to proceed with surgery and asked that this RN sign consent for her as she is unable to hold pen to sign consent. Charge RN, Milo, in room to witness pt's consent to proceed with surgery, consent placed in chart. Pt asked that this RN contact her significant other, Mike, to inform him of plan for surgery and ask that he come to hospital to be with her. Mike contacted and updated on plan; he will come to hospital when he gets off work at 1500. Pt updated. This RN asked pt if she would like any of her family contacted regarding her surgery, but she refused, stating, \"my son won't talk to me.\" This RN again offered to contact someone in her family, but she again refused.

## 2025-05-20 NOTE — PROGRESS NOTES
Clinical Pharmacy Note  Heparin Dosing       Lab Results   Component Value Date/Time    ANTIXAUHEP 0.24 05/20/2025 06:02 AM      Lab Results   Component Value Date/Time    HGB 9.0 05/20/2025 06:02 AM    HCT 25.4 05/20/2025 06:02 AM     05/20/2025 06:02 AM       Current Infusion Rate: 1200 units/hr    Plan:  Bolus: 2600 units  Rate: increase to 1330 units/hr  Next anti-Xa level: 1400 5/20/25    Pharmacy will continue to monitor and adjust based on anti-Xa results.      Jo-Ann Akbar RPH, PharmD 5/20/2025 7:21 AM

## 2025-05-20 NOTE — PROGRESS NOTES
V2.0    [facility] [place]      Name:  Radha Carcamo /Age/Sex: 1945  (79 y.o. female)   MRN & CSN:  [id number] & [id number] Encounter Date/Time: 2025 12:30 PM EDT   Location:  [id number] PCP: Niko Gunderson MD     Attending:Karan Radford MD       Hospital Day: 2    Assessment and Recommendations   Radha Carcamo is a 79 y.o. female with pmh of  who presents with Sacral wound, initial encounter    Sacral wound  Telemetry monitering  Empiric IV antibiotics : IV vancomycin and cefepime  Pharmacy consult to dose vancomycin  Blood culture x 2  Wound culture   Patient is n.p.o., scheduled for debridement today     Acute PE :     Acute pulmonary embolism  : Heparin drip on hold today morning  Continue heparin drip, monitor PT/INR as per heparin protocol, after procedure today  Check bilateral lower extremity venous Doppler  Cardiology consulted   DuoNebs  Oxygen via nasal cannula as needed to keep oxygen saturation more than 90%        Hyponatremia : improving  Give IV fluid normal saline for now     Could be 2/2 SIADH vs poor solute intake  Check Arabella, Uosm  Moniter BMP        Replace K with PO and IV KCL per protocol  Moniter K and Mg         PT and OT evaluation        Continue home meds for other chronic conditions     DVT ppx  : lovenox     Diet : regular diet     Code status  : full code    Took me more than 51  minutes for clinical management, coordination of care, reviewing labs , ordering medication as needed , reviewing consultants notes and recommendation  including plan of care discussion with the patient ,nursing staff and case management and consultants      Diet Diet NPO   DVT Prophylaxis [] Lovenox, []  Heparin, [] SCDs, [] Ambulation,  [] Eliquis, [] Xarelto  [] Coumadin   Code Status Full Code   Disposition From:  Expected Disposition:   Estimated Date of Discharge:   Patient requires continued admission due to    Surrogate Decision Maker/ POA       Personally reviewed  facet arthropathy.  There is at least moderate spinal canal narrowing at the L3-L4 level and at the L5-S1 level. SOFT TISSUES: No paraspinal mass is seen.     1. Pulmonary emboli are seen in the right lower lobar pulmonary artery extending to segmental branch and in a left upper lobe segmental branch pulmonary artery. No convincing evidence of right heart strain. 2. Prominent intrahepatic and extrahepatic bile ducts, likely representing reservoir effect in the setting of prior cholecystectomy.  Consider correlation with LFTs. 3. Mild superior endplate deformities of the T4, L2, and L5 vertebral bodies are favored nonacute. Correlate with clinical symptoms. 4. Sacral decubitus ulcer without visualized underlying osteomyelitis. 5. Moderate body wall edema. 6. Note limited evaluation of the abdomen and pelvis due to motion artifact despite repeat scan. Discussed with Dr. Yeison Ledbetter by Dr. Prado at 2:31 PM on 5/19/2025     CT LUMBAR SPINE BONY RECONSTRUCTION  Result Date: 5/19/2025  EXAMINATION: CT OF THE CHEST, ABDOMEN, AND PELVIS WITH CONTRAST; CT OF THE THORACIC SPINE WITHOUT CONTRAST; CT OF THE LUMBAR SPINE WITHOUT CONTRAST 5/19/2025 12:25 pm TECHNIQUE: CT of the chest, abdomen and pelvis was performed with the administration of intravenous contrast. Multiplanar reformatted images are provided for review. Automated exposure control, iterative reconstruction, and/or weight based adjustment of the mA/kV was utilized to reduce the radiation dose to as low as reasonably achievable.; CT of the thoracic spine was performed without the administration of intravenous contrast. Multiplanar reformatted images are provided for review. Automated exposure control, iterative reconstruction, and/or weight based adjustment of the mA/kV was utilized to reduce the radiation dose to as low as reasonably achievable.; CT of the lumbar spine was performed without the administration of intravenous contrast. Multiplanar reformatted images

## 2025-05-20 NOTE — PROGRESS NOTES
Occupational Therapy  Unable to see pt at this time due to pt significantly lethargic from recent morphine in prep for wound debridement. Will follow up with pt as time allows.    Adriano Webb, OTR/L

## 2025-05-20 NOTE — CONSULTS
Infectious Diseases Inpatient Consult Note      Reason for Consult:  Infected Sacral Decubitus ulcer     Requesting Physician:      Primary Care Physician:  Niko Gunderson MD    History Obtained From:  Epic     CHIEF COMPLAINT:     Chief Complaint   Patient presents with    Leg Pain     Patient to ER via EMS, she states she is her today because her boyfriend is no longer able to care for her. Bilat leg pain and swelling since November.          HISTORY OF PRESENT ILLNESS:  79 y.o. female with significant history for spinal stenosis admitted to hospital secondary to lower acuity pain, poor mobility noted to have infected sacral decubitus ulcer with ongoing drainage.  CT head on admission negative, CT chest indicated pulmonary emboli involving the right lower lobe artery extending into segmental branch, CT of the lumbar spine sacral decubitus ulcer without underlying osteomyelitis, extensive bilateral foraminal arthropathy with moderate spinal stenosis and narrowing noted at L3-L5.  Due to ongoing infection in the sacral area was evaluated by general surgery patient now taken for surgery for debridement.  Labs indicate sodium was 124 LFT normal WBC 11.3 hemoglobin 10.1.  Urinalysis negative.  We are consulted for recommendations        Past Medical History:    Past Medical History:   Diagnosis Date    Spinal stenosis        Past Surgical History:    Past Surgical History:   Procedure Laterality Date    EYE SURGERY      cataract ou       Current Medications:    Outpatient Medications Marked as Taking for the 5/19/25 encounter (Hospital Encounter)   Medication Sig Dispense Refill    gabapentin (NEURONTIN) 100 MG capsule Take 1 capsule by mouth 3 times daily.      ibuprofen (ADVIL;MOTRIN) 400 MG tablet Take 1-2 tablets by mouth every 6 hours as needed for Pain or Fever         Allergies:  Codeine    Immunizations :   Immunization History   Administered Date(s) Administered    COVID-19, MODERNA, 2024/25,

## 2025-05-20 NOTE — PROGRESS NOTES
Clinical Pharmacy Note  Heparin Dosing       Lab Results   Component Value Date/Time    ANTIXAUHEP 0.45 05/19/2025 11:30 PM      Lab Results   Component Value Date/Time    HGB 10.1 05/19/2025 11:35 AM    HCT 29.1 05/19/2025 11:35 AM     05/19/2025 11:35 AM       Current Infusion Rate: 1200 units/hr    Plan:  Rate: continue 1200 units/hr  Next anti-Xa level: 0600 5/20/25    Pharmacy will continue to monitor and adjust based on anti-Xa results.      Danielle Del Real RPH, PharmD 5/20/2025 12:06 AM

## 2025-05-20 NOTE — PROGRESS NOTES
Patient alert and oriented X4. Patient BP 75/60 mmhg, manually rechecked 90/60mmhg. Patient denies dizziness. Yessy Sunshine NP informed. No new orders.Fall preventive measures promoted.Necessary items kept within reach.       Repeat BP 93/73mmhg at 2145H. Yessy Sunshine NP Informed.      Electronically signed by Kaylyn Adler RN on 5/19/2025 at 10:02 PM

## 2025-05-20 NOTE — PLAN OF CARE
Problem: Discharge Planning  Goal: Discharge to home or other facility with appropriate resources  Outcome: Progressing  Flowsheets (Taken 5/20/2025 1305)  Discharge to home or other facility with appropriate resources:   Identify barriers to discharge with patient and caregiver   Identify discharge learning needs (meds, wound care, etc)   Refer to discharge planning if patient needs post-hospital services based on physician order or complex needs related to functional status, cognitive ability or social support system   Arrange for needed discharge resources and transportation as appropriate     Problem: Pain  Goal: Verbalizes/displays adequate comfort level or baseline comfort level  Outcome: Progressing  Flowsheets (Taken 5/20/2025 1305)  Verbalizes/displays adequate comfort level or baseline comfort level:   Encourage patient to monitor pain and request assistance   Administer analgesics based on type and severity of pain and evaluate response   Consider cultural and social influences on pain and pain management   Assess pain using appropriate pain scale   Implement non-pharmacological measures as appropriate and evaluate response   Notify Licensed Independent Practitioner if interventions unsuccessful or patient reports new pain     Problem: Skin/Tissue Integrity  Goal: Skin integrity remains intact  Description: 1.  Monitor for areas of redness and/or skin breakdown2.  Assess vascular access sites hourly3.  Every 4-6 hours minimum:  Change oxygen saturation probe site4.  Every 4-6 hours:  If on nasal continuous positive airway pressure, respiratory therapy assess nares and determine need for appliance change or resting period  Outcome: Progressing  Flowsheets (Taken 5/20/2025 1305)  Skin Integrity Remains Intact:   Monitor for areas of redness and/or skin breakdown   Turn and reposition as indicated   Assess need for specialty bed   Positioning devices   Pressure redistribution bed/mattress (bed type)

## 2025-05-20 NOTE — PROGRESS NOTES
Pt arrived back to room at 1755 from PACU. She denied having any current pain, but did c/o ongoing nausea. PRN Zofran administered. Pt's significant other at bedside. Keenan remains in place. Continue care.

## 2025-05-20 NOTE — CONSULTS
Patient ID: Radha Carcamo  Referring/ Physician: Karan Radford MD      Summary:   Radha Carcamo is being seen by nephrology for hyponatremia .     Reason for admission: sacral wound       Interval Hx:     Seen at bedside.   Awake and alert.   No complaints.   No edema.   Na 124 > 131 with fluids so changed to slightly hypotonic fluids to avoid overcorrection.     She then dropped to 126 today  > fluid changed back to saline.     Assessment/Plan:   - goal sodium should be around 130 today   - changed fluids back to NS after sodium dropped to 126.   - she asymptomatic. No fluid overload. BP acceptable.   - q 8 hrs sodium level.       Hyponatremia   Presenting with sodium of 124  Received IV fluids and sodium jacob to 131  Goal correction 4-6 meq in 24 hrs  Replace potassium     Hypokalemia   Replacing as needed.       Sacral ulcer  On antibiotics.         [facility] would like to thank you for the opportunity to serve this patient. Please call with any questions or concerns.    Hawa Curiel MD  [facility]  [address] [place], [place] [id number]  Fax: [phone]  Office: [phone]    \[facility] [place] [facility]\""  Radha Carcamo is a 79 y.o. female  with  has a past medical history of Spinal stenosis. who presented with sacral decubitus ulcer. .was found to have incidental finding of PE on chest CT. She was started on antibiotics and received IV fluids. Placed on heparin gtt. Nephrology consulted for hyponatremia with admitted sodium level of 124.      Review of Systems:   As above.     PMH/SH/FH:    Medical Hx: reviewed and updated as appropriate  Past Medical History:   Diagnosis Date    Spinal stenosis          Surgical Hx: reviewed and updated as appropriate   has a past surgical history that includes eye surgery.     Social Hx: reviewed and updated as appropriate  Social History     Tobacco Use    Smoking status: Former     Current packs/day: 1.00     Average packs/day: 1

## 2025-05-20 NOTE — PROGRESS NOTES
Spiritual Health History and Assessment/Progress Note  [facility]    Spiritual/Emotional Needs,  , Adjustment to illness,      Name: Radha Carcamo MRN: [id number]    Age: 79 y.o.     Sex: female   Language: English   Religious: Jewish   Sacral wound, initial encounter     Date: 5/20/2025            Total Time Calculated: 15 min              Spiritual Assessment began in [facility] OR        Referral/Consult From: Nurse   Encounter Overview/Reason: Spiritual/Emotional Needs       Viviana, Belief, Meaning:   Patient is connected with a vivinaa tradition or spiritual practice and has beliefs or practices that help with coping during difficult times  Family/Friends No family/friends present      Importance and Influence:  Patient has no beliefs influential to healthcare decision-making identified during this visit  Family/Friends No family/friends present    Community:  Patient feels well-supported. Support system includes: Spouse/Partner, Children, and Extended family  Family/Friends No family/friends present    Assessment and Plan of Care:     Patient Interventions include: Facilitated expression of thoughts and feelings, Explored spiritual coping/struggle/distress, and Affirmed coping skills/support systems  Family/Friends Interventions include: No family/friends present    Patient Plan of Care: Spiritual Care available upon further referral  Family/Friends Plan of Care: No family/friends present    Electronically signed by Chaplain YELENA on 5/20/2025 at 3:01 PM

## 2025-05-20 NOTE — PROGRESS NOTES
Pt resting in bed this a.m. She c/o ongoing pain in RLE and sacrum, both rated 10/10. She denies having any nausea, numbness, or tingling. She reports PRN Percocet has been ineffective. Pt being turned and repositioned every two hours. She is A.Fib on telemetry. Fall precautions in place, belongings within reach. Continue care.

## 2025-05-21 PROBLEM — A49.8 E COLI INFECTION: Status: ACTIVE | Noted: 2025-05-21

## 2025-05-21 PROBLEM — L89.153 DECUBITUS ULCER OF SACRAL REGION, STAGE 3 (HCC): Status: ACTIVE | Noted: 2025-05-21

## 2025-05-21 LAB
ANION GAP SERPL CALCULATED.3IONS-SCNC: 6 MMOL/L (ref 3–16)
ANION GAP SERPL CALCULATED.3IONS-SCNC: 8 MMOL/L (ref 3–16)
ANION GAP SERPL CALCULATED.3IONS-SCNC: 9 MMOL/L (ref 3–16)
ANTI-XA UNFRAC HEPARIN: 0.24 IU/ML (ref 0.3–0.7)
ANTI-XA UNFRAC HEPARIN: 0.44 IU/ML (ref 0.3–0.7)
ANTI-XA UNFRAC HEPARIN: 0.47 IU/ML (ref 0.3–0.7)
BUN SERPL-MCNC: 13 MG/DL (ref 7–20)
BUN SERPL-MCNC: 15 MG/DL (ref 7–20)
BUN SERPL-MCNC: 15 MG/DL (ref 7–20)
CALCIUM SERPL-MCNC: 8.1 MG/DL (ref 8.3–10.6)
CALCIUM SERPL-MCNC: 8.2 MG/DL (ref 8.3–10.6)
CALCIUM SERPL-MCNC: 8.3 MG/DL (ref 8.3–10.6)
CHLORIDE SERPL-SCNC: 92 MMOL/L (ref 99–110)
CHLORIDE SERPL-SCNC: 93 MMOL/L (ref 99–110)
CHLORIDE SERPL-SCNC: 96 MMOL/L (ref 99–110)
CO2 SERPL-SCNC: 23 MMOL/L (ref 21–32)
CREAT SERPL-MCNC: 0.3 MG/DL (ref 0.6–1.2)
DEPRECATED RDW RBC AUTO: 12.3 % (ref 12.4–15.4)
GFR SERPLBLD CREATININE-BSD FMLA CKD-EPI: >90 ML/MIN/{1.73_M2}
GLUCOSE SERPL-MCNC: 119 MG/DL (ref 70–99)
GLUCOSE SERPL-MCNC: 133 MG/DL (ref 70–99)
GLUCOSE SERPL-MCNC: 94 MG/DL (ref 70–99)
HCT VFR BLD AUTO: 24 % (ref 36–48)
HGB BLD-MCNC: 8.6 G/DL (ref 12–16)
MCH RBC QN AUTO: 33.9 PG (ref 26–34)
MCHC RBC AUTO-ENTMCNC: 35.7 G/DL (ref 31–36)
MCV RBC AUTO: 94.9 FL (ref 80–100)
OSMOLALITY UR: 233 MOSM/KG (ref 390–1070)
PLATELET # BLD AUTO: 290 K/UL (ref 135–450)
PMV BLD AUTO: 7.4 FL (ref 5–10.5)
POTASSIUM SERPL-SCNC: 3.1 MMOL/L (ref 3.5–5.1)
POTASSIUM SERPL-SCNC: 3.2 MMOL/L (ref 3.5–5.1)
POTASSIUM SERPL-SCNC: 3.5 MMOL/L (ref 3.5–5.1)
RBC # BLD AUTO: 2.53 M/UL (ref 4–5.2)
SODIUM SERPL-SCNC: 124 MMOL/L (ref 136–145)
SODIUM SERPL-SCNC: 124 MMOL/L (ref 136–145)
SODIUM SERPL-SCNC: 125 MMOL/L (ref 136–145)
SODIUM UR-SCNC: 98 MMOL/L
VANCOMYCIN SERPL-MCNC: 4.7 UG/ML
WBC # BLD AUTO: 8.8 K/UL (ref 4–11)

## 2025-05-21 PROCEDURE — 2060000000 HC ICU INTERMEDIATE R&B

## 2025-05-21 PROCEDURE — 6370000000 HC RX 637 (ALT 250 FOR IP): Performed by: SURGERY

## 2025-05-21 PROCEDURE — 6360000002 HC RX W HCPCS: Performed by: HOSPITALIST

## 2025-05-21 PROCEDURE — 6360000002 HC RX W HCPCS: Performed by: SURGERY

## 2025-05-21 PROCEDURE — 6370000000 HC RX 637 (ALT 250 FOR IP): Performed by: INTERNAL MEDICINE

## 2025-05-21 PROCEDURE — 36415 COLL VENOUS BLD VENIPUNCTURE: CPT

## 2025-05-21 PROCEDURE — G0545 PR INHERENT VISIT TO INPT: HCPCS | Performed by: INTERNAL MEDICINE

## 2025-05-21 PROCEDURE — 6360000002 HC RX W HCPCS: Performed by: STUDENT IN AN ORGANIZED HEALTH CARE EDUCATION/TRAINING PROGRAM

## 2025-05-21 PROCEDURE — 99233 SBSQ HOSP IP/OBS HIGH 50: CPT | Performed by: INTERNAL MEDICINE

## 2025-05-21 PROCEDURE — 85520 HEPARIN ASSAY: CPT

## 2025-05-21 PROCEDURE — 97605 NEG PRS WND THER DME<=50SQCM: CPT

## 2025-05-21 PROCEDURE — 85027 COMPLETE CBC AUTOMATED: CPT

## 2025-05-21 PROCEDURE — 2580000003 HC RX 258: Performed by: SURGERY

## 2025-05-21 PROCEDURE — 2500000003 HC RX 250 WO HCPCS: Performed by: SURGERY

## 2025-05-21 PROCEDURE — 84300 ASSAY OF URINE SODIUM: CPT

## 2025-05-21 PROCEDURE — 83935 ASSAY OF URINE OSMOLALITY: CPT

## 2025-05-21 PROCEDURE — 94760 N-INVAS EAR/PLS OXIMETRY 1: CPT

## 2025-05-21 PROCEDURE — APPNB15 APP NON BILLABLE TIME 0-15 MINS: Performed by: PHYSICIAN ASSISTANT

## 2025-05-21 PROCEDURE — 99024 POSTOP FOLLOW-UP VISIT: CPT | Performed by: SURGERY

## 2025-05-21 PROCEDURE — 80202 ASSAY OF VANCOMYCIN: CPT

## 2025-05-21 PROCEDURE — 97530 THERAPEUTIC ACTIVITIES: CPT

## 2025-05-21 PROCEDURE — 80048 BASIC METABOLIC PNL TOTAL CA: CPT

## 2025-05-21 RX ORDER — FUROSEMIDE 10 MG/ML
40 INJECTION INTRAMUSCULAR; INTRAVENOUS ONCE
Status: COMPLETED | OUTPATIENT
Start: 2025-05-21 | End: 2025-05-21

## 2025-05-21 RX ORDER — PROCHLORPERAZINE EDISYLATE 5 MG/ML
10 INJECTION INTRAMUSCULAR; INTRAVENOUS EVERY 6 HOURS PRN
Status: DISCONTINUED | OUTPATIENT
Start: 2025-05-21 | End: 2025-05-30 | Stop reason: HOSPADM

## 2025-05-21 RX ORDER — HEPARIN SODIUM 1000 [USP'U]/ML
2600 INJECTION, SOLUTION INTRAVENOUS; SUBCUTANEOUS ONCE
Status: COMPLETED | OUTPATIENT
Start: 2025-05-21 | End: 2025-05-21

## 2025-05-21 RX ADMIN — CEFEPIME 2000 MG: 2 INJECTION, POWDER, FOR SOLUTION INTRAVENOUS at 11:47

## 2025-05-21 RX ADMIN — ACETAMINOPHEN 1000 MG: 500 TABLET ORAL at 20:51

## 2025-05-21 RX ADMIN — METRONIDAZOLE 500 MG: 500 INJECTION, SOLUTION INTRAVENOUS at 03:20

## 2025-05-21 RX ADMIN — Medication 15 G: at 08:37

## 2025-05-21 RX ADMIN — HEPARIN SODIUM 2600 UNITS: 1000 INJECTION INTRAVENOUS; SUBCUTANEOUS at 08:44

## 2025-05-21 RX ADMIN — GABAPENTIN 100 MG: 100 CAPSULE ORAL at 13:56

## 2025-05-21 RX ADMIN — Medication: at 21:06

## 2025-05-21 RX ADMIN — HYDROMORPHONE HYDROCHLORIDE 1 MG: 1 INJECTION, SOLUTION INTRAMUSCULAR; INTRAVENOUS; SUBCUTANEOUS at 14:31

## 2025-05-21 RX ADMIN — GABAPENTIN 100 MG: 100 CAPSULE ORAL at 21:06

## 2025-05-21 RX ADMIN — HYDROMORPHONE HYDROCHLORIDE 1 MG: 1 INJECTION, SOLUTION INTRAMUSCULAR; INTRAVENOUS; SUBCUTANEOUS at 20:52

## 2025-05-21 RX ADMIN — HEPARIN SODIUM 1330 UNITS/HR: 10000 INJECTION, SOLUTION INTRAVENOUS at 00:17

## 2025-05-21 RX ADMIN — FUROSEMIDE 40 MG: 10 INJECTION, SOLUTION INTRAMUSCULAR; INTRAVENOUS at 11:45

## 2025-05-21 RX ADMIN — METRONIDAZOLE 500 MG: 500 INJECTION, SOLUTION INTRAVENOUS at 16:28

## 2025-05-21 RX ADMIN — PROCHLORPERAZINE EDISYLATE 10 MG: 5 INJECTION INTRAMUSCULAR; INTRAVENOUS at 09:45

## 2025-05-21 RX ADMIN — ONDANSETRON 4 MG: 2 INJECTION, SOLUTION INTRAMUSCULAR; INTRAVENOUS at 04:12

## 2025-05-21 RX ADMIN — SODIUM CHLORIDE, PRESERVATIVE FREE 10 ML: 5 INJECTION INTRAVENOUS at 20:54

## 2025-05-21 RX ADMIN — ONDANSETRON 4 MG: 2 INJECTION, SOLUTION INTRAMUSCULAR; INTRAVENOUS at 18:32

## 2025-05-21 RX ADMIN — Medication: at 08:37

## 2025-05-21 RX ADMIN — ACETAMINOPHEN 1000 MG: 500 TABLET ORAL at 13:55

## 2025-05-21 RX ADMIN — HEPARIN SODIUM 1460 UNITS/HR: 10000 INJECTION, SOLUTION INTRAVENOUS at 19:12

## 2025-05-21 RX ADMIN — SODIUM CHLORIDE 1500 MG: 0.9 INJECTION, SOLUTION INTRAVENOUS at 06:14

## 2025-05-21 RX ADMIN — METRONIDAZOLE 500 MG: 500 INJECTION, SOLUTION INTRAVENOUS at 08:42

## 2025-05-21 RX ADMIN — GABAPENTIN 100 MG: 100 CAPSULE ORAL at 08:37

## 2025-05-21 RX ADMIN — CEFEPIME 2000 MG: 2 INJECTION, POWDER, FOR SOLUTION INTRAVENOUS at 23:53

## 2025-05-21 RX ADMIN — HYDROMORPHONE HYDROCHLORIDE 0.5 MG: 1 INJECTION, SOLUTION INTRAMUSCULAR; INTRAVENOUS; SUBCUTANEOUS at 11:51

## 2025-05-21 RX ADMIN — ACETAMINOPHEN 1000 MG: 500 TABLET ORAL at 04:10

## 2025-05-21 ASSESSMENT — PAIN DESCRIPTION - ORIENTATION
ORIENTATION: RIGHT;LEFT
ORIENTATION: RIGHT;LEFT
ORIENTATION: MID
ORIENTATION: RIGHT;LEFT

## 2025-05-21 ASSESSMENT — PAIN SCALES - GENERAL
PAINLEVEL_OUTOF10: 0
PAINLEVEL_OUTOF10: 0
PAINLEVEL_OUTOF10: 4
PAINLEVEL_OUTOF10: 4
PAINLEVEL_OUTOF10: 5
PAINLEVEL_OUTOF10: 10
PAINLEVEL_OUTOF10: 5
PAINLEVEL_OUTOF10: 10

## 2025-05-21 ASSESSMENT — PAIN - FUNCTIONAL ASSESSMENT
PAIN_FUNCTIONAL_ASSESSMENT: ACTIVITIES ARE NOT PREVENTED
PAIN_FUNCTIONAL_ASSESSMENT: ACTIVITIES ARE NOT PREVENTED

## 2025-05-21 ASSESSMENT — PAIN DESCRIPTION - LOCATION
LOCATION: KNEE
LOCATION: BUTTOCKS
LOCATION: SACRUM
LOCATION: KNEE

## 2025-05-21 ASSESSMENT — PAIN DESCRIPTION - DESCRIPTORS
DESCRIPTORS: ACHING
DESCRIPTORS: ACHING;DISCOMFORT
DESCRIPTORS: ACHING
DESCRIPTORS: ACHING;DISCOMFORT

## 2025-05-21 NOTE — PROGRESS NOTES
Clinical Pharmacy Note  Vancomycin Consult    Radha Carcamo is a 79 y.o. female ordered vancomycin for SSTI; consult received from Dr. Radford to manage therapy. Also receiving cefepime.    Allergies:  Codeine     Temp max:  Temp (24hrs), Av.9 °F (37.2 °C), Min:98.2 °F (36.8 °C), Max:99.9 °F (37.7 °C)      Recent Labs     25  1135 25  0602 25  0559   WBC 11.3* 7.5 8.8       Recent Labs     25  1818 25  2317 25  0559   BUN 12 12 13   CREATININE 0.3* 0.3* 0.3*         Intake/Output Summary (Last 24 hours) at 2025 0842  Last data filed at 2025 0614  Gross per 24 hour   Intake 2256.5 ml   Output 1570 ml   Net 686.5 ml       Culture Results:  Sacral wound--> 4+ gram positive cocci and 4+ gram variable rods     Ht Readings from Last 1 Encounters:   25 1.6 m (5' 2.99\")        Wt Readings from Last 1 Encounters:   25 66.6 kg (146 lb 13.2 oz)         Estimated Creatinine Clearance: 142 mL/min (A) (based on SCr of 0.3 mg/dL (L)).    Assessment:  Day # 3 of vancomycin.  Vancomycin 1500 mg IV every 24 hours.    Goal -600  Predicted  @ steady state    Plan:  Adjust regimen to 1000 mg q12h  New predicted AUC is 447 (ss)   Level ordered prior to third dose.     Thank you for the consult.    Electronically signed by Demi Jeronimo RPH on 2025 at 8:42 AM

## 2025-05-21 NOTE — PROGRESS NOTES
Clinical Pharmacy Note  Heparin Dosing       Lab Results   Component Value Date/Time    ANTIXAUHEP <0.10 05/20/2025 07:48 PM      Lab Results   Component Value Date/Time    HGB 9.0 05/20/2025 06:02 AM    HCT 25.4 05/20/2025 06:02 AM     05/20/2025 06:02 AM       Current Infusion Rate: 0 units/hr - held until 2100 per Dr. Champion    Plan:  Bolus: 0 units  Rate: 1330 units/hr  Next anti-Xa level: 0400 05/21/25    Pharmacy will continue to monitor and adjust based on anti-Xa results.    Lily Wise, PharmD

## 2025-05-21 NOTE — PLAN OF CARE
Problem: Discharge Planning  Goal: Discharge to home or other facility with appropriate resources  5/21/2025 1024 by Gualberto Owens RN  Outcome: Progressing  5/21/2025 0343 by Judith Moses RN  Outcome: Progressing     Problem: Pain  Goal: Verbalizes/displays adequate comfort level or baseline comfort level  5/21/2025 1024 by Gualberto Owens RN  Outcome: Progressing  5/21/2025 0343 by Judith Moses RN  Outcome: Progressing     Problem: Skin/Tissue Integrity  Goal: Skin integrity remains intact  Description: 1.  Monitor for areas of redness and/or skin breakdown2.  Assess vascular access sites hourly3.  Every 4-6 hours minimum:  Change oxygen saturation probe site4.  Every 4-6 hours:  If on nasal continuous positive airway pressure, respiratory therapy assess nares and determine need for appliance change or resting period  5/21/2025 1024 by Gualberto Owens RN  Outcome: Progressing  5/21/2025 0343 by Judith Moses RN  Outcome: Progressing     Problem: Safety - Adult  Goal: Free from fall injury  5/21/2025 1024 by Gualberto Owens RN  Outcome: Progressing  5/21/2025 0343 by Judith Moses RN  Outcome: Progressing     Problem: ABCDS Injury Assessment  Goal: Absence of physical injury  5/21/2025 1024 by Gualberto Owens RN  Outcome: Progressing  5/21/2025 0343 by Judith Moses RN  Outcome: Progressing

## 2025-05-21 NOTE — PROGRESS NOTES
Clinical Pharmacy Note  Heparin Dosing       Lab Results   Component Value Date/Time    ANTIXAUHEP 0.47 05/21/2025 03:34 PM      Lab Results   Component Value Date/Time    HGB 8.6 05/21/2025 05:59 AM    HCT 24.0 05/21/2025 05:59 AM     05/21/2025 05:59 AM       Current Infusion Rate: 1460 units/hr    Plan:  Rate: continue 1460 units/hr  Next anti-Xa level: 2130 05/21/25    Pharmacy will continue to monitor and adjust based on anti-Xa results.  Guillermina Bullard, [facility],5/21/2025,4:46 PM

## 2025-05-21 NOTE — CARE COORDINATION
Wound Referral Progress Note       NAME:  Radha Carcamo  MEDICAL RECORD NUMBER:  [id number]  AGE: 79 y.o.   GENDER: female  : 1945  TODAY'S DATE:  2025    Subjective   Reason for WOCN Evaluation and Assessment: wound vac placement      Radha Carcamo is a 79 y.o. female referred by:   Provider    Wound Identification:  Wound Type: pressure  Contributing Factors: chronic pressure and decreased mobility    Wound History: Unstageable sacral ulcer s/p debridement POD #1 by Dr. Champion.  From home with her boyfriend. Boyfriend unable to care for her.  Poor appetite, weakness, and frequent falls.   Current Wound Care Treatment:  n/a    Patient Care Goal:  Wound Healing        PAST MEDICAL HISTORY        Diagnosis Date    Spinal stenosis        PAST SURGICAL HISTORY    Past Surgical History:   Procedure Laterality Date    EYE SURGERY      cataract ou    RECTAL SURGERY N/A 2025    EXCISIONAL DEBRIDEMENT SACRAL ULCER performed by Milo Champion MD at [facility] OR       FAMILY HISTORY    Family History   Problem Relation Age of Onset    Cancer Mother     Cancer Father        SOCIAL HISTORY    Social History     Tobacco Use    Smoking status: Former     Current packs/day: 1.00     Average packs/day: 1 pack/day for 10.0 years (10.0 ttl pk-yrs)     Types: Cigarettes    Smokeless tobacco: Never   Substance Use Topics    Alcohol use: Yes     Comment: occ    Drug use: No       ALLERGIES    Allergies   Allergen Reactions    Codeine Nausea And Vomiting       MEDICATIONS    No current facility-administered medications on file prior to encounter.     Current Outpatient Medications on File Prior to Encounter   Medication Sig Dispense Refill    gabapentin (NEURONTIN) 100 MG capsule Take 1 capsule by mouth 3 times daily.      ibuprofen (ADVIL;MOTRIN) 400 MG tablet Take 1-2 tablets by mouth every 6 hours as needed for Pain or Fever         Objective    BP (!) 139/92   Pulse 92   Temp 99 °F (37.2 °C) (Oral)

## 2025-05-21 NOTE — CARE COORDINATION
Case Management Assessment  Initial Evaluation    Date/Time of Evaluation: 5/21/2025 2:55 PM  Assessment Completed by: Clotilde Rader RN    If patient is discharged prior to next notation, then this note serves as note for discharge by case management.    Patient Name: Radha Carcamo                     YOB: 1945  Diagnosis: Hyponatremia [E87.1]  Bilateral leg edema [R60.0]  Bilateral pulmonary embolism (HCC) [I26.99]  Cellulitis of sacral region [L03.319]  Sacral wound, initial encounter [S31.000A]  Acute cervical myofascial strain, initial encounter [S16.1XXA]  Contusion of head, unspecified part of head, initial encounter [S00.93XA]  Atrial fibrillation, unspecified type ([facility]) [I48.91]  Pressure injury of skin of sacral region, unspecified injury stage [L89.159]  Compression fracture of lumbar vertebra, unspecified lumbar vertebral level, initial encounter ([facility]) [S32.000A]                     Date / Time: 5/19/2025 10:29 AM    Patient Admission Status: Inpatient   Readmission Risk (Low < 19, Mod (19-27), High > 27): Readmission Risk Score: 15.2    Current PCP: Madai Morocho APRN - NP  PCP verified by CM? (P) Yes    Chart Reviewed: Yes      History Provided by: Patient, Medical Record  Patient Orientation: Alert and Oriented    Patient Cognition: Alert    Hospitalization in the last 30 days (Readmission):  No    If yes, Readmission Assessment in CM Navigator will be completed.    Advance Directives:      Code Status: Full Code   Patient's Primary Decision Maker is: Legal Next of Kin    Primary Decision Maker: Pradeep Carcamo - Child - [phone]    Discharge Planning:    Patient lives with: (P) Spouse/Significant Other Type of Home: (P) Apartment (7 steps to enter)  Primary Care Giver: Other (Comment) (boyfriend)  Patient Support Systems include: Spouse/Significant Other, Children   Current Financial resources: (P) Medicare  Current community resources: (P) None  Current services prior to

## 2025-05-21 NOTE — PROGRESS NOTES
Clinical Pharmacy Note  Heparin Dosing       Lab Results   Component Value Date/Time    ANTIXAUHEP 0.24 05/21/2025 05:59 AM      Lab Results   Component Value Date/Time    HGB 8.6 05/21/2025 05:59 AM    HCT 24.0 05/21/2025 05:59 AM     05/21/2025 05:59 AM       Current Infusion Rate: 1330 units/hr    Plan:  Bolus: 2600 units  Rate: Increase to 1460 units/hr  Next anti-Xa level: 1500 05/21/25    Pharmacy will continue to monitor and adjust based on anti-Xa results.    Jermaine De Santiago RPH, PharmD, BCPS  5/21/2025 8:12 AM

## 2025-05-21 NOTE — PROGRESS NOTES
Physical Therapy  Initial Evaluation Attempt and Discharge    25    Name: Radha Carcamo   : 1945    MRN: [id number]    PT order noted. Patient refusing therapy at this time. Reports she doesn't want any therapy during this admission. States she hasn't been mobilizing other than boyfriend dependently lifting her to a rollator and then dependently pushing her around. She also reports she cannot feed herself. PT will sign off at this time. Will return if she is agreeable to mobilize oob, otherwise recommend ECF without PT.     Total Treatment Time: 10 minutes    Electronically signed by Kulwinder Cherry PT on 2025 at 12:57 PM

## 2025-05-21 NOTE — PROGRESS NOTES
Patient ID: Radha Carcamo  Referring/ Physician: Shun Harris MD      Summary:   Radha Carcamo is being seen by nephrology for hyponatremia .     Reason for admission: sacral wound       Interval Hx:     Seen at bedside.   Awake and alert.   Has some nausea. Poor PO intake     UO 1570 CC past day  Normotensive, on room air.   No edema.      Na stuck at 125  Was on NS at 75 cc/hr overnight.   Urine sodium 49   Urine osm 419       Assessment/Plan:   - goal sodium should be around 130 today   - urea 15 g daily added. Stop IV fluids.   - she asymptomatic. No fluid overload. BP acceptable.   - q 8 hrs sodium level.       Hyponatremia   Presenting with sodium of 124  Received IV fluids and sodium jacob to 131  Goal correction 4-6 meq in 24 hrs  Replace potassium     Hypokalemia   Replacing as needed.       Sacral ulcer  On antibiotics.         [facility] would like to thank you for the opportunity to serve this patient. Please call with any questions or concerns.    Hawa Curiel MD  [facility]  [address] [place] [address]  Fax: [phone]  Office: [phone]    [facility]  Radha Carcamo is a 79 y.o. female  with  has a past medical history of Spinal stenosis. who presented with sacral decubitus ulcer. .was found to have incidental finding of PE on chest CT. She was started on antibiotics and received IV fluids. Placed on heparin gtt. Nephrology consulted for hyponatremia with admitted sodium level of 124.      Review of Systems:   As above.     PMH/SH/FH:    Medical Hx: reviewed and updated as appropriate  Past Medical History:   Diagnosis Date    Spinal stenosis          Surgical Hx: reviewed and updated as appropriate   has a past surgical history that includes eye surgery.     Social Hx: reviewed and updated as appropriate  Social History     Tobacco Use    Smoking status: Former     Current packs/day: 1.00     Average packs/day: 1 pack/day for 10.0 years  (10.0 ttl pk-yrs)     Types: Cigarettes    Smokeless tobacco: Never   Substance Use Topics    Alcohol use: Yes     Comment: occ        Family hx: reviewed and updated as appropriate  family history includes Cancer in her father and mother.    Medications:   urea, 15 g, Daily  cefepime, 2,000 mg, Q12H  metroNIDAZOLE, 500 mg, Q8H  acetaminophen, 1,000 mg, 3 times per day  sodium chloride flush, 5-40 mL, 2 times per day  gabapentin, 100 mg, TID  vancomycin, 1,500 mg, Q24H  vancomycin (VANCOCIN) intermittent dosing (placeholder), , RX Placeholder  collagenase, , BID       Codeine    Allergies:   Allergies   Allergen Reactions    Codeine Nausea And Vomiting         Physical Exam/Objective:   Vitals:    05/21/25 0351   BP: 122/77   Pulse: 85   Resp: 18   Temp: 98.8 °F (37.1 °C)   SpO2: 95%       Intake/Output Summary (Last 24 hours) at 5/21/2025 0713  Last data filed at 5/21/2025 0614  Gross per 24 hour   Intake 2256.5 ml   Output 1570 ml   Net 686.5 ml       General appearance:  in NAD, fully alert   Respiratory: Respiratory effort appears normal, no wheeze, no crackles  Cardiovascular: Ausculation- No M/R/G, no edema  Skin:no rashes  Neuro: face symmetric, no focal deficits    Data:   CBC:   Recent Labs     05/19/25  1135 05/20/25  0602   WBC 11.3* 7.5   HGB 10.1* 9.0*   HCT 29.1* 25.4*    265     BMP:    Recent Labs     05/20/25  0602 05/20/25  1818 05/20/25  2317   * 125* 125*   K 4.0 3.9 3.6   CL 95* 95* 95*   CO2 24 24 24   BUN 16 12 12   CREATININE 0.4* 0.3* 0.3*   GLUCOSE 98 103* 98     Lab Results   Component Value Date/Time    COLORU Yellow 05/19/2025 12:22 PM    NITRU Negative 05/19/2025 12:22 PM    GLUCOSEU Negative 05/19/2025 12:22 PM    KETUA Negative 05/19/2025 12:22 PM    UROBILINOGEN 1.0 05/19/2025 12:22 PM    BILIRUBINUR Negative 05/19/2025 12:22 PM           Patient states no history

## 2025-05-21 NOTE — PROGRESS NOTES
Informed Dr Shields that patient have episode of increase HR-140s then converted to NSR around Hr-80s with episodes of A Fib new onset without new order made. Electronically signed by Gualberto Owens RN on 5/21/2025 at 8:57 AM

## 2025-05-21 NOTE — PROGRESS NOTES
General Surgery  Daily Progress Note    Pt Name: Radha Carcamo  Medical Record Number: [id number]  Date of Birth 1945   Today's Date: 5/21/2025    Chief Complaint   Patient presents with    Leg Pain     Patient to ER via EMS, she states she is her today because her boyfriend is no longer able to care for her. Bilat leg pain and swelling since November.        ASSESSMENT/PLAN  Unstageable sacral ulcer s/p debridement POD #1 - continue local wound care.  Will consult wound care team for vac placement.  Continue vancomycin, cefepime, flagyl.    PE - heparin gtt resumed.  Hgb stable      SUBJECTIVE  Radha is improved from yesterday. Pain is better controlled. She has some nausea and no vomiting. She is tolerating a dysphagia diet . Current activity is ad teri    OBJECTIVE  VITALS:  height is 1.6 m (5' 2.99\") and weight is 66.6 kg (146 lb 13.2 oz). Her oral temperature is 98.2 °F (36.8 °C). Her blood pressure is 130/85 and her pulse is 88. Her respiration is 18 and oxygen saturation is 92%.   GENERAL: alert, no distress  LUNGS: normal respiratory effort, no accessory muscle use  ABDOMEN: soft, non-tender and non-distended  Sacral ulcer dressing in place  EXTREMITY: no cyanosis and no clubbing  I/O last 3 completed shifts:  In: 2256.5 [I.V.:1806.5; IV Piggyback:450]  Out: 2420 [Urine:2400; Blood:20]  No intake/output data recorded.    LABS  Recent Labs     05/19/25  1135 05/19/25  1222 05/19/25  1543 05/21/25  0559   WBC 11.3*  --    < > 8.8   HGB 10.1*  --    < > 8.6*   HCT 29.1*  --    < > 24.0*     --    < > 290   *  --    < > 125*   K 3.6  --    < > 3.5   CL 90*  --    < > 96*   CO2 23  --    < > 23   BUN 21*  --    < > 13   CREATININE 0.5*  --    < > 0.3*   CALCIUM 9.5  --    < > 8.3   AST 14*  --   --   --    ALT 6*  --   --   --    BILITOT 0.3  --   --   --    NITRU  --  Negative  --   --    COLORU  --  Yellow  --   --     < > = values in this interval not displayed.   CBC with

## 2025-05-21 NOTE — PROGRESS NOTES
Occupational Therapy  Orders received. Chart reviewed. Pt declining all OT attempts at evaluation. Pt declining all  OOB. mobility. Pt reports needing significant assist to all transfers and ADLs including feeding. Pt stating she does not want to participate therapy until she is healed. Pt educated on need for OT due to current deficits. Assisted pt with beverage with Max A. Total A for repositioning in bed for feeding. Will sign off at this time. Only reorder if pt agreeable.     Time: 10 minutes    Adriano Webb OTR/L

## 2025-05-21 NOTE — PROGRESS NOTES
Infectious Diseases Inpatient Progress  Note    CHIEF COMPLAINT:     Infected sacral decubitus ulcer  Soft tissue infection  Poor mobility  Acute pulm embolism  Lower extremity edema  WBC elevation  Hyponatremia     HISTORY OF PRESENT ILLNESS:  79 y.o. female with significant history for spinal stenosis admitted to hospital secondary to lower acuity pain, poor mobility noted to have infected sacral decubitus ulcer with ongoing drainage.  CT head on admission negative, CT chest indicated pulmonary emboli involving the right lower lobe artery extending into segmental branch, CT of the lumbar spine sacral decubitus ulcer without underlying osteomyelitis, extensive bilateral foraminal arthropathy with moderate spinal stenosis and narrowing noted at L3-L5.  Due to ongoing infection in the sacral area was evaluated by general surgery patient now taken for surgery for debridement.  Labs indicate sodium was 124 LFT normal WBC 11.3 hemoglobin 10.1.  Urinalysis negative.  We are consulted for recommendations      Interval History : Patient is status post debridement of skin subcutaneous tissue and fascia of the sacral area secondary to ongoing infection and cellulitis wound culture positive for E. coli sensitivities pending blood cultures negative    Past Medical History:    Past Medical History:   Diagnosis Date    Spinal stenosis        Past Surgical History:    Past Surgical History:   Procedure Laterality Date    EYE SURGERY      cataract ou    RECTAL SURGERY N/A 5/20/2025    EXCISIONAL DEBRIDEMENT SACRAL ULCER performed by Milo Champion MD at [facility] OR       Current Medications:    Outpatient Medications Marked as Taking for the 5/19/25 encounter (Hospital Encounter)   Medication Sig Dispense Refill    gabapentin (NEURONTIN) 100 MG capsule Take 1 capsule by mouth 3 times daily.      ibuprofen (ADVIL;MOTRIN) 400 MG tablet Take 1-2 tablets by mouth every 6 hours as needed for Pain or Fever         Allergies:   Codeine    Immunizations :   Immunization History   Administered Date(s) Administered    COVID-19, MODERNA, 2024/25, (age 12y+), IM, 50mcg/0.5mL 09/22/2023    COVID-19, PFIZER Bivalent, DO NOT Dilute, (age 12y+), IM, 30 mcg/0.3 mL 11/22/2022    COVID-19, PFIZER GRAY top, DO NOT Dilute, (age 12 y+), IM, 30 mcg/0.3 mL 04/02/2022    COVID-19, PFIZER PURPLE top, DILUTE for use, (age 12 y+), 30mcg/0.3mL 03/30/2021, 04/27/2021, 10/28/2021    TDaP, ADACEL (age 10y-64y), BOOSTRIX (age 10y+), IM, 0.5mL 05/19/2025         Social History:     Social History     Tobacco Use    Smoking status: Former     Current packs/day: 1.00     Average packs/day: 1 pack/day for 10.0 years (10.0 ttl pk-yrs)     Types: Cigarettes    Smokeless tobacco: Never   Substance Use Topics    Alcohol use: Yes     Comment: occ    Drug use: No     Social History     Tobacco Use   Smoking Status Former    Current packs/day: 1.00    Average packs/day: 1 pack/day for 10.0 years (10.0 ttl pk-yrs)    Types: Cigarettes   Smokeless Tobacco Never      Family History   Problem Relation Age of Onset    Cancer Mother     Cancer Father           REVIEW OF SYSTEMS:      Constitutional:  negative for fevers, chills, night sweats  Eyes:  negative for blurred vision, eye discharge, visual disturbance   HEENT:  negative for hearing loss, ear drainage,nasal congestion  Respiratory:  negative for cough, shortness of breath or hemoptysis   Cardiovascular:  negative for chest pain, palpitations, syncope  Gastrointestinal:  negative for nausea, vomiting, diarrhea, constipation, abdominal pain  Genitourinary:  negative for frequency, dysuria, urinary incontinence, hematuria  Hematologic/Lymphatic:  negative for easy bruising, bleeding and lymphadenopathy  Allergic/Immunologic:  negative for recurrent infections, angioedema, anaphylaxis   Endocrine:  negative for weight changes, polyuria, polydipsia and polyphagia  Musculoskeletal: Lower leg weakness  Swelling, decreased range

## 2025-05-21 NOTE — PROGRESS NOTES
V2.0  [facility] Hospitalist Progress Note      Name:  Radha Carcamo /Age/Sex: 1945  (79 y.o. female)   MRN & CSN:  [id number] & [id number] Encounter Date/Time: 2025 9:18 AM EDT    Location:  [id number]/4128-01 PCP: Niko Gunderson MD       Hospital Day: 3    Assessment and Plan:   Radha Caracmo is a 79 y.o. female with p;w       Plan:  Sacral Wound  -s/p debridement   - General Surgery following. Note  reviewed: POD 1 s/p debridement. Local wound care, wound vac placement. Continue Abx  - ID note  reviewed: Continue cefepime, Flagyl, and vancomycin.  Monitor toxicity of vancomycin with BMP for JENS.  Wound culture in process  Hyponatremia  -Likely SIADH  - Goal sodium of 130 today  - Nephrology note  reviewed: Urea packet added.  Stop IV fluids.  Monitor sodiums Q8H  Acute pulmonary embolism  - On heparin gtt.  - Plan to transition to DOAC when no further procedures are planned  Lower extremity edema  -trialing dose of lasix. Monitoring toxicity of lasix with BMP for JENS.  -Conservative measures with leg elevation and compression  Debility  -Pt has not been mobilized since admission, PT/OT ordered. Patient refused to work with them, stating that her she is completely dependent on significant other (He lifts her to a rollator and pushes her around, Pt cannot feed herself). PT/OT signed off until patient is agreeable to attempt mobilizing out of bed.    Ppx: Heparin GTT  Dispo: Home situation.    Subjective:     Chief Complaint: Leg Pain (Patient to ER via EMS, she states she is her today because her boyfriend is no longer able to care for her. Bilat leg pain and swelling since November. )     Interval Hx:  Pt reports significant pain in lower extremities. She is demanding a water pill due to her leg swelling.    Review of Systems:    Review of Systems    10 point ROS negative except as stated above in \"subjective\" section    Objective:     Intake/Output Summary (Last 24 hours) at  5/21/2025 at 9:18 AM

## 2025-05-22 PROBLEM — B99.9 INFECTION REQUIRING CONTACT ISOLATION PRECAUTIONS: Status: ACTIVE | Noted: 2025-05-22

## 2025-05-22 PROBLEM — I48.91 ATRIAL FIBRILLATION WITH RVR (HCC): Status: ACTIVE | Noted: 2025-05-22

## 2025-05-22 PROBLEM — I48.91 ATRIAL FIBRILLATION (HCC): Status: ACTIVE | Noted: 2025-05-22

## 2025-05-22 PROBLEM — I47.10 SVT (SUPRAVENTRICULAR TACHYCARDIA): Status: ACTIVE | Noted: 2025-05-22

## 2025-05-22 PROBLEM — I48.0 PAROXYSMAL ATRIAL FIBRILLATION (HCC): Status: ACTIVE | Noted: 2025-05-22

## 2025-05-22 PROBLEM — A49.02 MRSA INFECTION (METHICILLIN-RESISTANT STAPHYLOCOCCUS AUREUS): Status: ACTIVE | Noted: 2025-05-22

## 2025-05-22 PROBLEM — S00.93XA HEAD CONTUSION: Status: ACTIVE | Noted: 2025-05-22

## 2025-05-22 LAB
ANION GAP SERPL CALCULATED.3IONS-SCNC: 9 MMOL/L (ref 3–16)
ANTI-XA UNFRAC HEPARIN: 0.44 IU/ML (ref 0.3–0.7)
BASOPHILS # BLD: 0 K/UL (ref 0–0.2)
BASOPHILS NFR BLD: 0.4 %
BUN SERPL-MCNC: 14 MG/DL (ref 7–20)
CALCIUM SERPL-MCNC: 8.5 MG/DL (ref 8.3–10.6)
CHLORIDE SERPL-SCNC: 92 MMOL/L (ref 99–110)
CO2 SERPL-SCNC: 23 MMOL/L (ref 21–32)
CREAT SERPL-MCNC: 0.3 MG/DL (ref 0.6–1.2)
DEPRECATED RDW RBC AUTO: 12.4 % (ref 12.4–15.4)
EKG DIAGNOSIS: NORMAL
EKG Q-T INTERVAL: 294 MS
EKG QRS DURATION: 70 MS
EKG QTC CALCULATION (BAZETT): 417 MS
EKG R AXIS: 25 DEGREES
EKG T AXIS: 30 DEGREES
EKG VENTRICULAR RATE: 121 BPM
EOSINOPHIL # BLD: 0.1 K/UL (ref 0–0.6)
EOSINOPHIL NFR BLD: 0.8 %
GFR SERPLBLD CREATININE-BSD FMLA CKD-EPI: >90 ML/MIN/{1.73_M2}
GLUCOSE SERPL-MCNC: 107 MG/DL (ref 70–99)
HCT VFR BLD AUTO: 25.5 % (ref 36–48)
HGB BLD-MCNC: 9 G/DL (ref 12–16)
LYMPHOCYTES # BLD: 0.7 K/UL (ref 1–5.1)
LYMPHOCYTES NFR BLD: 8.3 %
MCH RBC QN AUTO: 33.4 PG (ref 26–34)
MCHC RBC AUTO-ENTMCNC: 35.2 G/DL (ref 31–36)
MCV RBC AUTO: 94.8 FL (ref 80–100)
MONOCYTES # BLD: 0.4 K/UL (ref 0–1.3)
MONOCYTES NFR BLD: 4.4 %
NEUTROPHILS # BLD: 6.9 K/UL (ref 1.7–7.7)
NEUTROPHILS NFR BLD: 86.1 %
PLATELET # BLD AUTO: 302 K/UL (ref 135–450)
PMV BLD AUTO: 6.9 FL (ref 5–10.5)
POTASSIUM SERPL-SCNC: 3.3 MMOL/L (ref 3.5–5.1)
RBC # BLD AUTO: 2.69 M/UL (ref 4–5.2)
SODIUM SERPL-SCNC: 124 MMOL/L (ref 136–145)
TSH SERPL DL<=0.005 MIU/L-ACNC: 3.42 UIU/ML (ref 0.27–4.2)
WBC # BLD AUTO: 8 K/UL (ref 4–11)

## 2025-05-22 PROCEDURE — 6370000000 HC RX 637 (ALT 250 FOR IP): Performed by: INTERNAL MEDICINE

## 2025-05-22 PROCEDURE — 6370000000 HC RX 637 (ALT 250 FOR IP): Performed by: SURGERY

## 2025-05-22 PROCEDURE — 6360000002 HC RX W HCPCS: Performed by: INTERNAL MEDICINE

## 2025-05-22 PROCEDURE — 80048 BASIC METABOLIC PNL TOTAL CA: CPT

## 2025-05-22 PROCEDURE — 6360000002 HC RX W HCPCS: Performed by: SURGERY

## 2025-05-22 PROCEDURE — 93005 ELECTROCARDIOGRAM TRACING: CPT | Performed by: STUDENT IN AN ORGANIZED HEALTH CARE EDUCATION/TRAINING PROGRAM

## 2025-05-22 PROCEDURE — 2580000003 HC RX 258: Performed by: INTERNAL MEDICINE

## 2025-05-22 PROCEDURE — 2060000000 HC ICU INTERMEDIATE R&B

## 2025-05-22 PROCEDURE — 2500000003 HC RX 250 WO HCPCS: Performed by: SURGERY

## 2025-05-22 PROCEDURE — 2580000003 HC RX 258: Performed by: STUDENT IN AN ORGANIZED HEALTH CARE EDUCATION/TRAINING PROGRAM

## 2025-05-22 PROCEDURE — 6360000002 HC RX W HCPCS: Performed by: STUDENT IN AN ORGANIZED HEALTH CARE EDUCATION/TRAINING PROGRAM

## 2025-05-22 PROCEDURE — 99024 POSTOP FOLLOW-UP VISIT: CPT | Performed by: SURGERY

## 2025-05-22 PROCEDURE — G0545 PR INHERENT VISIT TO INPT: HCPCS | Performed by: INTERNAL MEDICINE

## 2025-05-22 PROCEDURE — 2580000003 HC RX 258: Performed by: SURGERY

## 2025-05-22 PROCEDURE — 94760 N-INVAS EAR/PLS OXIMETRY 1: CPT

## 2025-05-22 PROCEDURE — 6370000000 HC RX 637 (ALT 250 FOR IP): Performed by: STUDENT IN AN ORGANIZED HEALTH CARE EDUCATION/TRAINING PROGRAM

## 2025-05-22 PROCEDURE — APPSS15 APP SPLIT SHARED TIME 0-15 MINUTES: Performed by: PHYSICIAN ASSISTANT

## 2025-05-22 PROCEDURE — 99223 1ST HOSP IP/OBS HIGH 75: CPT | Performed by: INTERNAL MEDICINE

## 2025-05-22 PROCEDURE — 85025 COMPLETE CBC W/AUTO DIFF WBC: CPT

## 2025-05-22 PROCEDURE — 36415 COLL VENOUS BLD VENIPUNCTURE: CPT

## 2025-05-22 PROCEDURE — 2500000003 HC RX 250 WO HCPCS: Performed by: STUDENT IN AN ORGANIZED HEALTH CARE EDUCATION/TRAINING PROGRAM

## 2025-05-22 PROCEDURE — 84443 ASSAY THYROID STIM HORMONE: CPT

## 2025-05-22 PROCEDURE — APPNB15 APP NON BILLABLE TIME 0-15 MINS: Performed by: PHYSICIAN ASSISTANT

## 2025-05-22 PROCEDURE — 93010 ELECTROCARDIOGRAM REPORT: CPT | Performed by: INTERNAL MEDICINE

## 2025-05-22 PROCEDURE — 85520 HEPARIN ASSAY: CPT

## 2025-05-22 RX ORDER — FUROSEMIDE 10 MG/ML
40 INJECTION INTRAMUSCULAR; INTRAVENOUS ONCE
Status: COMPLETED | OUTPATIENT
Start: 2025-05-22 | End: 2025-05-22

## 2025-05-22 RX ORDER — METOPROLOL SUCCINATE 25 MG/1
25 TABLET, EXTENDED RELEASE ORAL 2 TIMES DAILY
Status: DISCONTINUED | OUTPATIENT
Start: 2025-05-22 | End: 2025-05-24

## 2025-05-22 RX ORDER — AMIODARONE HYDROCHLORIDE 200 MG/1
400 TABLET ORAL 2 TIMES DAILY
Status: DISCONTINUED | OUTPATIENT
Start: 2025-05-22 | End: 2025-05-30 | Stop reason: HOSPADM

## 2025-05-22 RX ORDER — DILTIAZEM HYDROCHLORIDE 5 MG/ML
10 INJECTION INTRAVENOUS ONCE
Status: COMPLETED | OUTPATIENT
Start: 2025-05-22 | End: 2025-05-22

## 2025-05-22 RX ADMIN — FUROSEMIDE 40 MG: 10 INJECTION, SOLUTION INTRAMUSCULAR; INTRAVENOUS at 17:48

## 2025-05-22 RX ADMIN — HYDROMORPHONE HYDROCHLORIDE 1 MG: 1 INJECTION, SOLUTION INTRAMUSCULAR; INTRAVENOUS; SUBCUTANEOUS at 05:06

## 2025-05-22 RX ADMIN — HYDROMORPHONE HYDROCHLORIDE 0.5 MG: 1 INJECTION, SOLUTION INTRAMUSCULAR; INTRAVENOUS; SUBCUTANEOUS at 20:26

## 2025-05-22 RX ADMIN — METRONIDAZOLE 500 MG: 500 INJECTION, SOLUTION INTRAVENOUS at 17:49

## 2025-05-22 RX ADMIN — ACETAMINOPHEN 1000 MG: 500 TABLET ORAL at 22:34

## 2025-05-22 RX ADMIN — HYDROMORPHONE HYDROCHLORIDE 0.5 MG: 1 INJECTION, SOLUTION INTRAMUSCULAR; INTRAVENOUS; SUBCUTANEOUS at 09:09

## 2025-05-22 RX ADMIN — APIXABAN 5 MG: 5 TABLET, FILM COATED ORAL at 20:27

## 2025-05-22 RX ADMIN — Medication 15 G: at 09:09

## 2025-05-22 RX ADMIN — CEFTAROLINE FOSAMIL 600 MG: 600 POWDER, FOR SOLUTION INTRAVENOUS at 20:35

## 2025-05-22 RX ADMIN — CEFEPIME 2000 MG: 2 INJECTION, POWDER, FOR SOLUTION INTRAVENOUS at 12:25

## 2025-05-22 RX ADMIN — GABAPENTIN 100 MG: 100 CAPSULE ORAL at 20:27

## 2025-05-22 RX ADMIN — DILTIAZEM HYDROCHLORIDE 5 MG/HR: 5 INJECTION INTRAVENOUS at 12:29

## 2025-05-22 RX ADMIN — ACETAMINOPHEN 1000 MG: 500 TABLET ORAL at 05:04

## 2025-05-22 RX ADMIN — GABAPENTIN 100 MG: 100 CAPSULE ORAL at 09:10

## 2025-05-22 RX ADMIN — SODIUM CHLORIDE 30 ML: 0.9 INJECTION, SOLUTION INTRAVENOUS at 09:29

## 2025-05-22 RX ADMIN — HYDROMORPHONE HYDROCHLORIDE 1 MG: 1 INJECTION, SOLUTION INTRAMUSCULAR; INTRAVENOUS; SUBCUTANEOUS at 01:03

## 2025-05-22 RX ADMIN — METRONIDAZOLE 500 MG: 500 INJECTION, SOLUTION INTRAVENOUS at 09:30

## 2025-05-22 RX ADMIN — SODIUM CHLORIDE, PRESERVATIVE FREE 10 ML: 5 INJECTION INTRAVENOUS at 09:10

## 2025-05-22 RX ADMIN — POTASSIUM BICARBONATE 40 MEQ: 391 TABLET, EFFERVESCENT ORAL at 10:08

## 2025-05-22 RX ADMIN — GABAPENTIN 100 MG: 100 CAPSULE ORAL at 13:17

## 2025-05-22 RX ADMIN — METRONIDAZOLE 500 MG: 500 INJECTION, SOLUTION INTRAVENOUS at 01:02

## 2025-05-22 RX ADMIN — DILTIAZEM HYDROCHLORIDE 10 MG: 5 INJECTION, SOLUTION INTRAVENOUS at 12:27

## 2025-05-22 RX ADMIN — OXYCODONE AND ACETAMINOPHEN 1 TABLET: 5; 325 TABLET ORAL at 22:33

## 2025-05-22 RX ADMIN — AMIODARONE HYDROCHLORIDE 400 MG: 200 TABLET ORAL at 20:27

## 2025-05-22 RX ADMIN — ACETAMINOPHEN 1000 MG: 500 TABLET ORAL at 13:17

## 2025-05-22 RX ADMIN — ONDANSETRON 4 MG: 2 INJECTION, SOLUTION INTRAMUSCULAR; INTRAVENOUS at 22:36

## 2025-05-22 RX ADMIN — HYDROMORPHONE HYDROCHLORIDE 1 MG: 1 INJECTION, SOLUTION INTRAMUSCULAR; INTRAVENOUS; SUBCUTANEOUS at 15:05

## 2025-05-22 RX ADMIN — PROCHLORPERAZINE EDISYLATE 10 MG: 5 INJECTION INTRAMUSCULAR; INTRAVENOUS at 01:50

## 2025-05-22 RX ADMIN — HEPARIN SODIUM 1460 UNITS/HR: 10000 INJECTION, SOLUTION INTRAVENOUS at 12:24

## 2025-05-22 RX ADMIN — ONDANSETRON 4 MG: 4 TABLET, ORALLY DISINTEGRATING ORAL at 09:11

## 2025-05-22 RX ADMIN — SODIUM CHLORIDE, PRESERVATIVE FREE 10 ML: 5 INJECTION INTRAVENOUS at 22:39

## 2025-05-22 RX ADMIN — METOPROLOL SUCCINATE 25 MG: 25 TABLET, EXTENDED RELEASE ORAL at 20:28

## 2025-05-22 RX ADMIN — Medication 7.5 MG: at 10:47

## 2025-05-22 ASSESSMENT — PAIN DESCRIPTION - LOCATION
LOCATION: KNEE

## 2025-05-22 ASSESSMENT — PAIN - FUNCTIONAL ASSESSMENT
PAIN_FUNCTIONAL_ASSESSMENT: PREVENTS OR INTERFERES WITH ALL ACTIVE AND SOME PASSIVE ACTIVITIES
PAIN_FUNCTIONAL_ASSESSMENT: ACTIVITIES ARE NOT PREVENTED
PAIN_FUNCTIONAL_ASSESSMENT: PREVENTS OR INTERFERES WITH ALL ACTIVE AND SOME PASSIVE ACTIVITIES

## 2025-05-22 ASSESSMENT — PAIN SCALES - GENERAL
PAINLEVEL_OUTOF10: 8
PAINLEVEL_OUTOF10: 10
PAINLEVEL_OUTOF10: 10
PAINLEVEL_OUTOF10: 6
PAINLEVEL_OUTOF10: 10
PAINLEVEL_OUTOF10: 9
PAINLEVEL_OUTOF10: 5
PAINLEVEL_OUTOF10: 5
PAINLEVEL_OUTOF10: 3

## 2025-05-22 ASSESSMENT — PAIN DESCRIPTION - DESCRIPTORS
DESCRIPTORS: ACHING;THROBBING
DESCRIPTORS: DISCOMFORT;ACHING
DESCRIPTORS: ACHING;THROBBING
DESCRIPTORS: ACHING
DESCRIPTORS: ACHING;DISCOMFORT
DESCRIPTORS: ACHING;DULL

## 2025-05-22 ASSESSMENT — PAIN DESCRIPTION - ORIENTATION
ORIENTATION: RIGHT;LEFT

## 2025-05-22 ASSESSMENT — PAIN DESCRIPTION - ONSET
ONSET: ON-GOING
ONSET: ON-GOING

## 2025-05-22 ASSESSMENT — PAIN DESCRIPTION - FREQUENCY
FREQUENCY: CONTINUOUS
FREQUENCY: CONTINUOUS

## 2025-05-22 ASSESSMENT — PAIN DESCRIPTION - PAIN TYPE
TYPE: CHRONIC PAIN
TYPE: CHRONIC PAIN

## 2025-05-22 NOTE — PROGRESS NOTES
Called Pradeep Carcamo 3x but not answer, voicemail not available also, hence called Mike (JENNA) to inform that patient was transferred to room 5278.  Electronically signed by Gualberto Owens RN on 5/22/2025 at 11:46 AM

## 2025-05-22 NOTE — PROGRESS NOTES
Clinical Pharmacy Note  Heparin Dosing       Lab Results   Component Value Date/Time    ANTIXAUHEP 0.44 05/22/2025 05:35 AM      Lab Results   Component Value Date/Time    HGB 8.6 05/21/2025 05:59 AM    HCT 24.0 05/21/2025 05:59 AM     05/21/2025 05:59 AM       Current Infusion Rate: 1460 units/hr    Plan:  Bolus: 0 units  Rate: 1460 units/hr  Next anti-Xa level: 0600 05/23/25    Pharmacy will continue to monitor and adjust based on anti-Xa results.    Lily Wise, PharmD

## 2025-05-22 NOTE — PROGRESS NOTES
Clinical Pharmacy Note  Heparin Dosing       Lab Results   Component Value Date/Time    ANTIXAUHEP 0.44 05/21/2025 09:24 PM      Lab Results   Component Value Date/Time    HGB 8.6 05/21/2025 05:59 AM    HCT 24.0 05/21/2025 05:59 AM     05/21/2025 05:59 AM       Current Infusion Rate: 1460 units/hr    Plan:  Bolus: 0 units  Rate: 1460 units/hr  Next anti-Xa level: 0400 05/22/25    Pharmacy will continue to monitor and adjust based on anti-Xa results.    Lily Wise, PharmD

## 2025-05-22 NOTE — PROGRESS NOTES
Infectious Diseases   Progress Note      Admission Date: 5/19/2025  Hospital Day: Hospital Day: 4   Attending: Shun Harris MD  Date of service: 5/22/2025     Chief complaint/ Reason for consult:     Sacral decubitus ulcer wound infection-wound cultures growing MRSA and E. coli  Need for contact isolation  Bandemia  Right lower extremity pulmonary embolism  Lumbar spinal stenosis  Generalized weakness on admission    Microbiology:      I have reviewed allavailable micro lab data and cultures    Results       Procedure Component Value Units Date/Time    Culture, Surgical [[id number]]  (Abnormal)  (Susceptibility) Collected: 05/20/25 1631    Order Status: Completed Specimen: Specimen from Sacrum Updated: 05/22/25 1407     Gram Stain Result 3+ WBC's (Polymorphonuclear) present  2+ WBC's (Mononuclear) present  4+ Gram positive cocci  4+ Gram variable rods present       Anaerobic Culture --     Anaerobic culture further report to follow  No anaerobes isolated so far, Further report to follow       Organism Escherichia coli     Culture Surgical Heavy growth    Narrative:      ORDER#: [id number]                          ORDERED BY: TANG TYSON  SOURCE: Sacrum                             COLLECTED:  05/20/25 16:31  ANTIBIOTICS AT ANDRÉS.:                      RECEIVED :  05/20/25 16:56    Susceptibility        Escherichia coli      BACTERIAL SUSCEPTIBILITY PANEL BY TEJINDER      ampicillin <=2 mcg/mL Sensitive      ampicillin-sulbactam <=2 mcg/mL Sensitive      ceFAZolin <=1 mcg/mL Sensitive      cefepime <=0.12 mcg/mL Sensitive      cefTRIAXone <=0.25 mcg/mL Sensitive      ciprofloxacin <=0.06 mcg/mL Sensitive      ertapenem <=0.12 mcg/mL Sensitive      gentamicin <=1 mcg/mL Sensitive      levofloxacin <=0.12 mcg/mL Sensitive      meropenem <=0.25 mcg/mL Sensitive      piperacillin-tazobactam <=4 mcg/mL Sensitive      trimethoprim-sulfamethoxazole <=20 mcg/mL Sensitive                           Culture, Wound (with Gram

## 2025-05-22 NOTE — PROGRESS NOTES
4 Eyes Skin Assessment     NAME:  Radha Carcamo  YOB: 1945  MEDICAL RECORD NUMBER:  [id number]    The patient is being assessed for  Transfer to New Unit    I agree that at least one RN has performed a thorough Head to Toe Skin Assessment on the patient. ALL assessment sites listed below have been assessed.      Areas assessed by both nurses:    Head, Face, Ears, Shoulders, Back, Chest, Arms, Elbows, Hands, Sacrum. Buttock, Coccyx, Ischium, Legs. Feet and Heels, and Under Medical Devices         Does the Patient have a Wound? Yes wound(s) were present on assessment. LDA wound assessment was Initiated and completed by RN       Dao Prevention initiated by RN: Yes  Wound Care Orders initiated by RN: Yes    Pressure Injury (Stage 3,4, Unstageable, DTI, NWPT, and Complex wounds) if present, place Wound referral order by RN under : Yes - wound care already consulted and aware    New Ostomies, if present place, Ostomy referral order under : No     Nurse 1 eSignature: Electronically signed by Medina Hernandez RN on 5/22/25 at 4:05 PM EDT    **SHARE this note so that the co-signing nurse can place an eSignature**    Nurse 2 eSignature: Electronically signed by Brianda Astudillo RN on 5/22/25 at 4:05 PM EDT

## 2025-05-22 NOTE — PROGRESS NOTES
V2.0  [facility] [place]      Name:  Radha Carcamo /Age/Sex: 1945  (79 y.o. female)   MRN & CSN:  [id number] & [id number] Encounter Date/Time: 2025 9:18 AM EDT    Location:  [id number]-2938/4128-01 PCP: Madai Morocho APRN - NP       Hospital Day: 4    Assessment and Plan:   Radha Carcamo is a 79 y.o. female with p;w       Plan:  Sacral Wound  -s/p debridement   -Wound culture positive for E. coli, pansensitive  - General Surgery following. Note  reviewed: POD 2 s/p debridement.  Wound VAC working well.  Change tomorrow.  Pain better controlled.  Continue antibiotics  - ID note  reviewed: Continue IV cefepime and Flagyl.  DC vancomycin.  Wound culture positive for E. coli.  Anticipate 3-week course of IV antibiotics.  May need placement  Hyponatremia  -Likely SIADH  -Sodium steady at 124  -Goal sodium of 130 today  -Discussed with nephrology : Giving dose of Samsca.  Okay for dose of Lasix in the afternoon  Paroxysmal atrial fibrillation with RVR  AVNRT  - EKG  independently interpreted as A-fib with RVR  - On review of admission EKG patient was in A-fib at that time as well.  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  Also with A-fib currently.  Likely will initiate on amiodarone and transition patient to DOAC.  Ideally would use flecainide but patient would need echocardiogram and stress test performed.  Acute pulmonary embolism  - On heparin gtt.  - Plan to transition to DOAC when no further procedures are planned  Lower extremity edema  - Giving additional dose of lasix. Monitoring toxicity of lasix with BMP for JENS.  -Conservative measures with leg elevation and compression  Debility  -Pt has not been mobilized since admission, PT/OT ordered. Patient refused to work with them, stating that  reduce the radiation dose to as low as reasonably achievable. COMPARISON: None available. CLINICAL HISTORY: Fall head injury. FINDINGS: BRAIN AND VENTRICLES: There is no acute intracranial hemorrhage, mass effect or midline shift. No abnormal extra-axial fluid collection. The gray-white differentiation is maintained without evidence of an acute infarct. There is no evidence of hydrocephalus. Mild periventricular hypoattenuation, likely representing mild chronic small vessel ischemic changes. ORBITS: The visualized portion of the orbits demonstrate no acute abnormality. SINUSES: The visualized paranasal sinuses and mastoid air cells demonstrate no acute abnormality. SOFT TISSUES AND SKULL: No acute abnormality of the visualized skull or soft tissues.     1. No acute intracranial abnormality. 2. Mild periventricular hypoattenuation, consistent with mild chronic small vessel ischemic changes.       Electronically signed by Shun Harris MD on 5/22/2025 at 8:40 AM

## 2025-05-22 NOTE — CARE COORDINATION
5/22 Facesheet faxed ([phone]) to Roseanne at [facility] for referral for OP Wound Vac if patient returns to home. Electronically signed by Clotilde Rader RN on 5/22/2025 at 12:37 PM

## 2025-05-22 NOTE — CONSULTS
[facility]   Electrophysiology Consultation     Date: 5/22/2025  Reason for Consultation: Atrial fibrillation    Consult Requesting Physician: Shun Harris MD     CC: Pain in buttocks      HPI:   Radha Carcamo is a 79 y.o. 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  During hospitalization she was noted to have tachycardia, initially consulted for atrial fibrillation.  She complains of intermittent palpitations but denies racing heart, she is currently in atrial fibrillation with heart rates in the 100s to 110s, asymptomatic.    Review of System:  Complete 10 point ROS performed and negative unless noted in above HPI or below    Prior to Admission medications    Medication Sig Start Date End Date Taking? Authorizing Provider   gabapentin (NEURONTIN) 100 MG capsule Take 1 capsule by mouth 3 times daily.   Yes ProviderSindy MD   ibuprofen (ADVIL;MOTRIN) 400 MG tablet Take 1-2 tablets by mouth every 6 hours as needed for Pain or Fever   Yes Provider, MD Sindy       Past Medical History:   Diagnosis Date    Spinal stenosis         Past Surgical History:   Procedure Laterality Date    EYE SURGERY      cataract ou    RECTAL SURGERY N/A 5/20/2025    EXCISIONAL DEBRIDEMENT SACRAL ULCER performed by Milo Champion MD at [facility] OR       Allergies   Allergen Reactions    Codeine Nausea And Vomiting       Social History:  Reviewed.  reports that she has quit smoking. Her smoking use included cigarettes. She has a 10 pack-year smoking history. She has never used smokeless tobacco. She reports current alcohol use. She reports that she does not use drugs.     Family History:  Reviewed. No family history of SCD.      Physical Examination:  BP 97/70   Pulse (!) 104   Temp 99.6

## 2025-05-22 NOTE — PLAN OF CARE
Problem: Discharge Planning  Goal: Discharge to home or other facility with appropriate resources  5/22/2025 1001 by Gualberto Owens RN  Outcome: Progressing  5/22/2025 0328 by Donna Ramos RN  Outcome: Progressing     Problem: Pain  Goal: Verbalizes/displays adequate comfort level or baseline comfort level  5/22/2025 1001 by Gualberto Owens RN  Outcome: Progressing  5/22/2025 0328 by Donna Ramos RN  Outcome: Progressing     Problem: Skin/Tissue Integrity  Goal: Skin integrity remains intact  Description: 1.  Monitor for areas of redness and/or skin breakdown2.  Assess vascular access sites hourly3.  Every 4-6 hours minimum:  Change oxygen saturation probe site4.  Every 4-6 hours:  If on nasal continuous positive airway pressure, respiratory therapy assess nares and determine need for appliance change or resting period  5/22/2025 1001 by Gualberto Owens RN  Outcome: Progressing  5/22/2025 0328 by Donna Ramos RN  Outcome: Progressing     Problem: Safety - Adult  Goal: Free from fall injury  5/22/2025 1001 by Gualberto Owens RN  Outcome: Progressing  5/22/2025 0328 by Donna Ramos RN  Outcome: Progressing     Problem: ABCDS Injury Assessment  Goal: Absence of physical injury  5/22/2025 1001 by Gualberto Owens RN  Outcome: Progressing  5/22/2025 0328 by Donna Ramos RN  Outcome: Progressing

## 2025-05-22 NOTE — PROGRESS NOTES
Patient arrived to room 5278. Heparin handoff performed with staff, Cardizem drip started. Patient connected to telemetry, tele confirmed with CMU, CMU informed of cardizem drip. Patient resting in bed. Wound vac in place and functioning. Standard safety measures in place. Patient educated on the use of call light and safety measures. Patient states understanding and has no questions at this time. Care ongoing.   ,Electronically signed by Medina Hernandez RN on 5/22/2025

## 2025-05-22 NOTE — PROGRESS NOTES
Comprehensive Nutrition Assessment    Type and Reason for Visit:  Initial, Positive nutrition screen    Nutrition Recommendations/Plan:   Continue soft and bite sized diet when appropriate per provider, reorder ensure plus BID     Malnutrition Assessment:  Malnutrition Status:  No malnutrition (05/22/25 1342)      Nutrition Assessment:    MST 3. Pt presenting w/ sacral wound. No recent wt hx on file. Pt previously on soft and bite sized diet w/ ensure plus ordered. Pt w/ variable intakes, drinking ensure ONS. Diet NPO today. Continue BID ensure when diet advances.    Nutrition Related Findings:    107 glucose, 3.3 K, 124 Na Wound Type: Wound Vac       Current Nutrition Intake & Therapies:    Average Meal Intake: NPO, 51-75%  Average Supplements Intake: 51-75%  Diet NPO    Anthropometric Measures:  Height: 160 cm (5' 2.99\")  Ideal Body Weight (IBW): 115 lbs (52 kg)       Current Body Weight: 70.6 kg (155 lb 10.3 oz),   IBW.    Current BMI (kg/m2): 27.6                                  Estimated Daily Nutrient Needs:  Energy Requirements Based On: Kcal/kg  Weight Used for Energy Requirements: Current  Energy (kcal/day): 1410 - 1765 (20 - 25 kcal/kg)  Weight Used for Protein Requirements: Ideal  Protein (g/day): 63 - 78 (1.2 - 1.5 g/kg IBW)  Method Used for Fluid Requirements: 1 ml/kcal  Fluid (ml/day): or per provider    Nutrition Diagnosis:   Increased nutrient needs related to increase demand for energy/nutrients as evidenced by wounds    Nutrition Interventions:   Food and/or Nutrient Delivery: Start Oral Diet, Start Oral Nutrition Supplement  Nutrition Education/Counseling: No recommendation at this time  Coordination of Nutrition Care: No recommendation at this time       Goals:  Goals: Meet at least 75% of estimated needs, by next RD assessment  Type of Goal: New goal  Previous Goal Met: New Goal    Nutrition Monitoring and Evaluation:   Behavioral-Environmental Outcomes: None Identified  Food/Nutrient Intake

## 2025-05-22 NOTE — PROGRESS NOTES
Patient is on continuous CMU monitor. Per CMU, her HR reached 130s- mid 150s, currently at 110-120s. NP notified.      Pt in pain, nauseated and restless when HR increased to 150s. Dilaudid and compazine were given. Current HR 80s-100s. Pt is asleep.    Rectal temp was obtained (99.7F) per NP advise.      05:42: CMU called again, Pt is tachy at 140s. Currently at 110-120s. Pain meds were given half an hour ago. MD notified.

## 2025-05-22 NOTE — PROGRESS NOTES
Patient ID: Radha Carcamo  Referring/ Physician: Shun Harris MD      Summary:   Radha Carcamo is being seen by nephrology for hyponatremia .     Reason for admission: sacral wound       Interval Hx:   Seen at bedside  /80  3175 mL urine yesterday  Started on Urena 15 g daily yesterday.  Sodium still unchanged at 124 today  Potassium 3.3      Assessment/Plan:   - Continue Urena 15 g daily  - Will give tolvaptan 7.5 mg x 1 today  - Replace potassium p.o.      Hyponatremia   Presenting with sodium of 124  Received IV fluids and sodium jacob to 131  Goal correction 4-6 meq in 24 hrs  Replace potassium     Hypokalemia   Replacing as needed.       Sacral ulcer  On antibiotics.         [facility] would like to thank you for the opportunity to serve this patient. Please call with any questions or concerns.    Maggy Millard MD  [facility]  [address] [id number]  Fax: [phone]  Office: [phone]    [facility]  Radha Carcamo is a 79 y.o. female  with  has a past medical history of Spinal stenosis. who presented with sacral decubitus ulcer. .was found to have incidental finding of PE on chest CT. She was started on antibiotics and received IV fluids. Placed on heparin gtt. Nephrology consulted for hyponatremia with admitted sodium level of 124.      Review of Systems:   As above.     PMH/SH/FH:    Medical Hx: reviewed and updated as appropriate  Past Medical History:   Diagnosis Date    Spinal stenosis          Surgical Hx: reviewed and updated as appropriate   has a past surgical history that includes eye surgery and Rectal surgery (N/A, 5/20/2025).     Social Hx: reviewed and updated as appropriate  Social History     Tobacco Use    Smoking status: Former     Current packs/day: 1.00     Average packs/day: 1 pack/day for 10.0 years (10.0 ttl pk-yrs)     Types: Cigarettes    Smokeless tobacco: Never   Substance Use Topics    Alcohol use: Yes     Comment:  occ        Family hx: reviewed and updated as appropriate  family history includes Cancer in her father and mother.    Medications:   urea, 15 g, Daily  cefepime, 2,000 mg, Q12H  metroNIDAZOLE, 500 mg, Q8H  acetaminophen, 1,000 mg, 3 times per day  sodium chloride flush, 5-40 mL, 2 times per day  gabapentin, 100 mg, TID  collagenase, , BID       Codeine    Allergies:   Allergies   Allergen Reactions    Codeine Nausea And Vomiting         Physical Exam/Objective:   Vitals:    05/22/25 0733   BP: 123/80   Pulse: (!) 103   Resp: 18   Temp: 98.1 °F (36.7 °C)   SpO2: 95%       Intake/Output Summary (Last 24 hours) at 5/22/2025 0911  Last data filed at 5/22/2025 0739  Gross per 24 hour   Intake 2103.8 ml   Output 3725 ml   Net -1621.2 ml       General appearance:  in NAD, fully alert   Respiratory: Respiratory effort appears normal, no wheeze, no crackles  Cardiovascular: Ausculation- No M/R/G, no edema  Skin:no rashes  Neuro: face symmetric, no focal deficits    Data:   CBC:   Recent Labs     05/20/25  0602 05/21/25  0559 05/22/25  0635   WBC 7.5 8.8 8.0   HGB 9.0* 8.6* 9.0*   HCT 25.4* 24.0* 25.5*    290 302     BMP:    Recent Labs     05/21/25  1534 05/21/25  2124 05/22/25  0535   * 124* 124*   K 3.1* 3.2* 3.3*   CL 93* 92* 92*   CO2 23 23 23   BUN 15 15 14   CREATININE 0.3* 0.3* 0.3*   GLUCOSE 133* 119* 107*     Lab Results   Component Value Date/Time    COLORU Yellow 05/19/2025 12:22 PM    NITRU Negative 05/19/2025 12:22 PM    GLUCOSEU Negative 05/19/2025 12:22 PM    KETUA Negative 05/19/2025 12:22 PM    UROBILINOGEN 1.0 05/19/2025 12:22 PM    BILIRUBINUR Negative 05/19/2025 12:22 PM

## 2025-05-22 NOTE — PROGRESS NOTES
General Surgery  Daily Progress Note    Pt Name: Radha Carcamo  Medical Record Number: [id number]  Date of Birth 1945   Today's Date: 5/22/2025    Chief Complaint   Patient presents with    Leg Pain     Patient to ER via EMS, she states she is her today because her boyfriend is no longer able to care for her. Bilat leg pain and swelling since November.        ASSESSMENT/PLAN  Unstageable sacral ulcer s/p debridement POD #2   Wound Vac in place working well. Change wound VAC tomorrow.    Continue antibiotics per ID  PE - heparin gtt resumed.  Hgb stable      SUBJECTIVE  Radha is improved from yesterday. Pain is better controlled. She has some nausea and no vomiting. She is tolerating a dysphagia diet . Current activity is ad teri    OBJECTIVE  VITALS:  height is 1.6 m (5' 2.99\") and weight is 70.6 kg (155 lb 10.3 oz). Her oral temperature is 98.1 °F (36.7 °C). Her blood pressure is 128/67 and her pulse is 95. Her respiration is 18 and oxygen saturation is 94%.   GENERAL: alert, no distress  LUNGS: normal respiratory effort, no accessory muscle use  ABDOMEN: soft, non-tender and non-distended  Sacral ulcer wound VAC in place  EXTREMITY: no cyanosis and no clubbing  I/O last 3 completed shifts:  In: 2313.8 [P.O.:1118; I.V.:385.7; IV Piggyback:810.1]  Out: 3825 [Urine:3675; Drains:150]  I/O this shift:  In: -   Out: 650 [Urine:650]    LABS  Recent Labs     05/22/25  0535 05/22/25  0635   WBC  --  8.0   HGB  --  9.0*   HCT  --  25.5*   PLT  --  302   *  --    K 3.3*  --    CL 92*  --    CO2 23  --    BUN 14  --    CREATININE 0.3*  --    CALCIUM 8.5  --    CBC with Differential:    Lab Results   Component Value Date/Time    WBC 8.0 05/22/2025 06:35 AM    RBC 2.69 05/22/2025 06:35 AM    HGB 9.0 05/22/2025 06:35 AM    HCT 25.5 05/22/2025 06:35 AM     05/22/2025 06:35 AM    MCV 94.8 05/22/2025 06:35 AM    MCH 33.4 05/22/2025 06:35 AM    MCHC 35.2 05/22/2025 06:35 AM    RDW 12.4 05/22/2025 06:35 AM

## 2025-05-23 ENCOUNTER — APPOINTMENT (OUTPATIENT)
Dept: GENERAL RADIOLOGY | Age: 80
End: 2025-05-23
Attending: INTERNAL MEDICINE
Payer: MEDICARE

## 2025-05-23 ENCOUNTER — APPOINTMENT (OUTPATIENT)
Age: 80
End: 2025-05-23
Attending: STUDENT IN AN ORGANIZED HEALTH CARE EDUCATION/TRAINING PROGRAM
Payer: MEDICARE

## 2025-05-23 ENCOUNTER — HOSPITAL ENCOUNTER (INPATIENT)
Age: 80
Discharge: HOME | End: 2025-05-25
Attending: INTERNAL MEDICINE
Payer: MEDICARE

## 2025-05-23 VITALS
OXYGEN SATURATION: 96 % | HEART RATE: 90 BPM | SYSTOLIC BLOOD PRESSURE: 128 MMHG | DIASTOLIC BLOOD PRESSURE: 82 MMHG | RESPIRATION RATE: 13 BRPM

## 2025-05-23 LAB
ALBUMIN SERPL-MCNC: 2.5 G/DL (ref 3.4–5)
ANION GAP SERPL CALCULATED.3IONS-SCNC: 8 MMOL/L (ref 3–16)
BACTERIA BLD CULT ORG #2: NORMAL
BACTERIA BLD CULT: NORMAL
BACTERIA SPEC AEROBE CULT: ABNORMAL
BUN SERPL-MCNC: 12 MG/DL (ref 7–20)
CALCIUM SERPL-MCNC: 8.9 MG/DL (ref 8.3–10.6)
CHLORIDE SERPL-SCNC: 93 MMOL/L (ref 99–110)
CO2 SERPL-SCNC: 27 MMOL/L (ref 21–32)
CREAT SERPL-MCNC: 0.3 MG/DL (ref 0.6–1.2)
DEPRECATED RDW RBC AUTO: 12.5 % (ref 12.4–15.4)
ECHO AO ROOT DIAM: 3.8 CM
ECHO AO ROOT INDEX: 2.22 CM/M2
ECHO AV AREA PEAK VELOCITY: 1.9 CM2
ECHO AV AREA VTI: 1.8 CM2
ECHO AV AREA/BSA PEAK VELOCITY: 1.1 CM2/M2
ECHO AV AREA/BSA VTI: 1.1 CM2/M2
ECHO AV MEAN GRADIENT: 4 MMHG
ECHO AV MEAN VELOCITY: 0.9 M/S
ECHO AV PEAK GRADIENT: 8 MMHG
ECHO AV PEAK VELOCITY: 1.4 M/S
ECHO AV VELOCITY RATIO: 0.57
ECHO AV VTI: 27 CM
ECHO BSA: 1.74 M2
ECHO EST RA PRESSURE: 8 MMHG
ECHO IVC PROX: 1.5 CM
ECHO LA AREA 2C: 17.1 CM2
ECHO LA AREA 4C: 21.5 CM2
ECHO LA DIAMETER INDEX: 2.16 CM/M2
ECHO LA DIAMETER: 3.7 CM
ECHO LA MAJOR AXIS: 4.5 CM
ECHO LA MINOR AXIS: 4.4 CM
ECHO LA TO AORTIC ROOT RATIO: 0.97
ECHO LA VOL BP: 62 ML (ref 22–52)
ECHO LA VOL MOD A2C: 55 ML (ref 22–52)
ECHO LA VOL MOD A4C: 77 ML (ref 22–52)
ECHO LA VOL/BSA BIPLANE: 36 ML/M2 (ref 16–34)
ECHO LA VOLUME INDEX MOD A2C: 32 ML/M2 (ref 16–34)
ECHO LA VOLUME INDEX MOD A4C: 45 ML/M2 (ref 16–34)
ECHO LV E' LATERAL VELOCITY: 12.2 CM/S
ECHO LV E' SEPTAL VELOCITY: 6.59 CM/S
ECHO LV EF PHYSICIAN: 60 %
ECHO LV FRACTIONAL SHORTENING: 32 % (ref 28–44)
ECHO LV INTERNAL DIMENSION DIASTOLE INDEX: 2.4 CM/M2
ECHO LV INTERNAL DIMENSION DIASTOLIC: 4.1 CM (ref 3.9–5.3)
ECHO LV INTERNAL DIMENSION SYSTOLIC INDEX: 1.64 CM/M2
ECHO LV INTERNAL DIMENSION SYSTOLIC: 2.8 CM
ECHO LV IVSD: 1.2 CM (ref 0.6–0.9)
ECHO LV MASS 2D: 132.1 G (ref 67–162)
ECHO LV MASS INDEX 2D: 77.3 G/M2 (ref 43–95)
ECHO LV POSTERIOR WALL DIASTOLIC: 0.8 CM (ref 0.6–0.9)
ECHO LV RELATIVE WALL THICKNESS RATIO: 0.39
ECHO LVOT AREA: 3.1 CM2
ECHO LVOT AV VTI INDEX: 0.59
ECHO LVOT DIAM: 2 CM
ECHO LVOT MEAN GRADIENT: 2 MMHG
ECHO LVOT PEAK GRADIENT: 3 MMHG
ECHO LVOT PEAK VELOCITY: 0.8 M/S
ECHO LVOT STROKE VOLUME INDEX: 29 ML/M2
ECHO LVOT SV: 49.6 ML
ECHO LVOT VTI: 15.8 CM
ECHO MV A VELOCITY: 0.67 M/S
ECHO MV E DECELERATION TIME (DT): 182 MS
ECHO MV E VELOCITY: 1.26 M/S
ECHO MV E/A RATIO: 1.88
ECHO MV E/E' LATERAL: 10.33
ECHO MV E/E' RATIO (AVERAGED): 14.72
ECHO MV E/E' SEPTAL: 19.12
ECHO PV MAX VELOCITY: 1 M/S
ECHO PV PEAK GRADIENT: 4 MMHG
ECHO RIGHT VENTRICULAR SYSTOLIC PRESSURE (RVSP): 37 MMHG
ECHO RV FREE WALL PEAK S': 10.2 CM/S
ECHO RV TAPSE: 2.8 CM (ref 1.7–?)
ECHO TV REGURGITANT MAX VELOCITY: 2.68 M/S
ECHO TV REGURGITANT PEAK GRADIENT: 29 MMHG
GFR SERPLBLD CREATININE-BSD FMLA CKD-EPI: >90 ML/MIN/{1.73_M2}
GLUCOSE SERPL-MCNC: 95 MG/DL (ref 70–99)
GRAM STN SPEC: ABNORMAL
HCT VFR BLD AUTO: 27.4 % (ref 36–48)
HGB BLD-MCNC: 9.3 G/DL (ref 12–16)
MCH RBC QN AUTO: 32.6 PG (ref 26–34)
MCHC RBC AUTO-ENTMCNC: 33.9 G/DL (ref 31–36)
MCV RBC AUTO: 96 FL (ref 80–100)
ORGANISM: ABNORMAL
PHOSPHATE SERPL-MCNC: 2.6 MG/DL (ref 2.5–4.9)
PLATELET # BLD AUTO: 381 K/UL (ref 135–450)
PMV BLD AUTO: 7.2 FL (ref 5–10.5)
POTASSIUM SERPL-SCNC: 3.8 MMOL/L (ref 3.5–5.1)
RBC # BLD AUTO: 2.85 M/UL (ref 4–5.2)
SODIUM SERPL-SCNC: 128 MMOL/L (ref 136–145)
WBC # BLD AUTO: 6.6 K/UL (ref 4–11)

## 2025-05-23 PROCEDURE — 6360000002 HC RX W HCPCS: Performed by: INTERNAL MEDICINE

## 2025-05-23 PROCEDURE — 74018 RADEX ABDOMEN 1 VIEW: CPT

## 2025-05-23 PROCEDURE — 2580000003 HC RX 258: Performed by: INTERNAL MEDICINE

## 2025-05-23 PROCEDURE — 6370000000 HC RX 637 (ALT 250 FOR IP): Performed by: INTERNAL MEDICINE

## 2025-05-23 PROCEDURE — 92960 CARDIOVERSION ELECTRIC EXT: CPT | Performed by: INTERNAL MEDICINE

## 2025-05-23 PROCEDURE — 80069 RENAL FUNCTION PANEL: CPT

## 2025-05-23 PROCEDURE — 6360000002 HC RX W HCPCS: Performed by: SURGERY

## 2025-05-23 PROCEDURE — 2500000003 HC RX 250 WO HCPCS: Performed by: SURGERY

## 2025-05-23 PROCEDURE — 94760 N-INVAS EAR/PLS OXIMETRY 1: CPT

## 2025-05-23 PROCEDURE — G0545 PR INHERENT VISIT TO INPT: HCPCS | Performed by: INTERNAL MEDICINE

## 2025-05-23 PROCEDURE — 5A2204Z RESTORATION OF CARDIAC RHYTHM, SINGLE: ICD-10-PCS | Performed by: INTERNAL MEDICINE

## 2025-05-23 PROCEDURE — 93306 TTE W/DOPPLER COMPLETE: CPT

## 2025-05-23 PROCEDURE — 85027 COMPLETE CBC AUTOMATED: CPT

## 2025-05-23 PROCEDURE — 6370000000 HC RX 637 (ALT 250 FOR IP): Performed by: STUDENT IN AN ORGANIZED HEALTH CARE EDUCATION/TRAINING PROGRAM

## 2025-05-23 PROCEDURE — 93306 TTE W/DOPPLER COMPLETE: CPT | Performed by: INTERNAL MEDICINE

## 2025-05-23 PROCEDURE — 99024 POSTOP FOLLOW-UP VISIT: CPT | Performed by: SURGERY

## 2025-05-23 PROCEDURE — 99233 SBSQ HOSP IP/OBS HIGH 50: CPT | Performed by: INTERNAL MEDICINE

## 2025-05-23 PROCEDURE — 36415 COLL VENOUS BLD VENIPUNCTURE: CPT

## 2025-05-23 PROCEDURE — 6370000000 HC RX 637 (ALT 250 FOR IP): Performed by: HOSPITALIST

## 2025-05-23 PROCEDURE — 6370000000 HC RX 637 (ALT 250 FOR IP): Performed by: SURGERY

## 2025-05-23 PROCEDURE — 2060000000 HC ICU INTERMEDIATE R&B

## 2025-05-23 PROCEDURE — 97605 NEG PRS WND THER DME<=50SQCM: CPT

## 2025-05-23 PROCEDURE — APPSS15 APP SPLIT SHARED TIME 0-15 MINUTES: Performed by: PHYSICIAN ASSISTANT

## 2025-05-23 PROCEDURE — APPNB15 APP NON BILLABLE TIME 0-15 MINS: Performed by: PHYSICIAN ASSISTANT

## 2025-05-23 PROCEDURE — 7100000011 HC PHASE II RECOVERY - ADDTL 15 MIN: Performed by: INTERNAL MEDICINE

## 2025-05-23 PROCEDURE — 51702 INSERT TEMP BLADDER CATH: CPT

## 2025-05-23 PROCEDURE — 92960 CARDIOVERSION ELECTRIC EXT: CPT

## 2025-05-23 PROCEDURE — 2500000003 HC RX 250 WO HCPCS: Performed by: INTERNAL MEDICINE

## 2025-05-23 PROCEDURE — 7100000010 HC PHASE II RECOVERY - FIRST 15 MIN: Performed by: INTERNAL MEDICINE

## 2025-05-23 RX ORDER — AMIODARONE HYDROCHLORIDE 150 MG/3ML
INJECTION, SOLUTION INTRAVENOUS PRN
Status: COMPLETED | OUTPATIENT
Start: 2025-05-23 | End: 2025-05-23

## 2025-05-23 RX ORDER — BISACODYL 5 MG/1
10 TABLET, DELAYED RELEASE ORAL ONCE
Status: COMPLETED | OUTPATIENT
Start: 2025-05-23 | End: 2025-05-23

## 2025-05-23 RX ORDER — TOLVAPTAN 15 MG/1
15 TABLET ORAL ONCE
Status: COMPLETED | OUTPATIENT
Start: 2025-05-23 | End: 2025-05-23

## 2025-05-23 RX ORDER — POLYETHYLENE GLYCOL 3350 17 G/17G
17 POWDER, FOR SOLUTION ORAL DAILY
Status: DISCONTINUED | OUTPATIENT
Start: 2025-05-24 | End: 2025-05-29

## 2025-05-23 RX ADMIN — HYDROMORPHONE HYDROCHLORIDE 1 MG: 1 INJECTION, SOLUTION INTRAMUSCULAR; INTRAVENOUS; SUBCUTANEOUS at 05:09

## 2025-05-23 RX ADMIN — BISACODYL 10 MG: 5 TABLET, COATED ORAL at 14:21

## 2025-05-23 RX ADMIN — HYDROMORPHONE HYDROCHLORIDE 1 MG: 1 INJECTION, SOLUTION INTRAMUSCULAR; INTRAVENOUS; SUBCUTANEOUS at 17:12

## 2025-05-23 RX ADMIN — AMIODARONE HYDROCHLORIDE 400 MG: 200 TABLET ORAL at 21:32

## 2025-05-23 RX ADMIN — HYDROMORPHONE HYDROCHLORIDE 1 MG: 1 INJECTION, SOLUTION INTRAMUSCULAR; INTRAVENOUS; SUBCUTANEOUS at 08:59

## 2025-05-23 RX ADMIN — CEFTAROLINE FOSAMIL 600 MG: 600 POWDER, FOR SOLUTION INTRAVENOUS at 08:55

## 2025-05-23 RX ADMIN — ACETAMINOPHEN 1000 MG: 500 TABLET ORAL at 14:22

## 2025-05-23 RX ADMIN — METRONIDAZOLE 500 MG: 500 INJECTION, SOLUTION INTRAVENOUS at 01:52

## 2025-05-23 RX ADMIN — GABAPENTIN 100 MG: 100 CAPSULE ORAL at 14:22

## 2025-05-23 RX ADMIN — ONDANSETRON 4 MG: 4 TABLET, ORALLY DISINTEGRATING ORAL at 15:24

## 2025-05-23 RX ADMIN — METRONIDAZOLE 500 MG: 500 INJECTION, SOLUTION INTRAVENOUS at 17:13

## 2025-05-23 RX ADMIN — AMIODARONE HYDROCHLORIDE 150 MG: 50 INJECTION, SOLUTION INTRAVENOUS at 11:44

## 2025-05-23 RX ADMIN — ACETAMINOPHEN 1000 MG: 500 TABLET ORAL at 21:31

## 2025-05-23 RX ADMIN — ONDANSETRON 4 MG: 2 INJECTION, SOLUTION INTRAMUSCULAR; INTRAVENOUS at 05:47

## 2025-05-23 RX ADMIN — OXYCODONE AND ACETAMINOPHEN 1 TABLET: 5; 325 TABLET ORAL at 15:23

## 2025-05-23 RX ADMIN — HYDROMORPHONE HYDROCHLORIDE 1 MG: 1 INJECTION, SOLUTION INTRAMUSCULAR; INTRAVENOUS; SUBCUTANEOUS at 21:32

## 2025-05-23 RX ADMIN — METRONIDAZOLE 500 MG: 500 INJECTION, SOLUTION INTRAVENOUS at 08:57

## 2025-05-23 RX ADMIN — SODIUM CHLORIDE, PRESERVATIVE FREE 10 ML: 5 INJECTION INTRAVENOUS at 08:58

## 2025-05-23 RX ADMIN — APIXABAN 5 MG: 5 TABLET, FILM COATED ORAL at 08:58

## 2025-05-23 RX ADMIN — TOLVAPTAN 15 MG: 15 TABLET ORAL at 14:22

## 2025-05-23 RX ADMIN — CEFTAROLINE FOSAMIL 600 MG: 600 POWDER, FOR SOLUTION INTRAVENOUS at 21:40

## 2025-05-23 RX ADMIN — METHOHEXITAL SODIUM 40 MG: 500 INJECTION, POWDER, LYOPHILIZED, FOR SOLUTION INTRAMUSCULAR; INTRAVENOUS; RECTAL at 11:39

## 2025-05-23 RX ADMIN — OXYCODONE AND ACETAMINOPHEN 1 TABLET: 5; 325 TABLET ORAL at 02:58

## 2025-05-23 RX ADMIN — GABAPENTIN 100 MG: 100 CAPSULE ORAL at 08:58

## 2025-05-23 RX ADMIN — GABAPENTIN 100 MG: 100 CAPSULE ORAL at 21:32

## 2025-05-23 RX ADMIN — AMIODARONE HYDROCHLORIDE 400 MG: 200 TABLET ORAL at 08:58

## 2025-05-23 RX ADMIN — APIXABAN 5 MG: 5 TABLET, FILM COATED ORAL at 21:31

## 2025-05-23 RX ADMIN — Medication 15 G: at 08:59

## 2025-05-23 RX ADMIN — METOPROLOL SUCCINATE 25 MG: 25 TABLET, EXTENDED RELEASE ORAL at 21:32

## 2025-05-23 RX ADMIN — HYDROMORPHONE HYDROCHLORIDE 0.5 MG: 1 INJECTION, SOLUTION INTRAMUSCULAR; INTRAVENOUS; SUBCUTANEOUS at 01:53

## 2025-05-23 ASSESSMENT — PAIN DESCRIPTION - ONSET
ONSET: ON-GOING

## 2025-05-23 ASSESSMENT — PAIN SCALES - GENERAL
PAINLEVEL_OUTOF10: 10
PAINLEVEL_OUTOF10: 7
PAINLEVEL_OUTOF10: 2
PAINLEVEL_OUTOF10: 10
PAINLEVEL_OUTOF10: 10
PAINLEVEL_OUTOF10: 4
PAINLEVEL_OUTOF10: 9
PAINLEVEL_OUTOF10: 10
PAINLEVEL_OUTOF10: 10
PAINLEVEL_OUTOF10: 8

## 2025-05-23 ASSESSMENT — PAIN DESCRIPTION - DESCRIPTORS
DESCRIPTORS: ACHING;THROBBING

## 2025-05-23 ASSESSMENT — PAIN DESCRIPTION - FREQUENCY
FREQUENCY: CONTINUOUS

## 2025-05-23 ASSESSMENT — PAIN DESCRIPTION - PAIN TYPE
TYPE: CHRONIC PAIN

## 2025-05-23 ASSESSMENT — PAIN - FUNCTIONAL ASSESSMENT
PAIN_FUNCTIONAL_ASSESSMENT: PREVENTS OR INTERFERES WITH ALL ACTIVE AND SOME PASSIVE ACTIVITIES

## 2025-05-23 ASSESSMENT — PAIN DESCRIPTION - ORIENTATION
ORIENTATION: RIGHT;LEFT

## 2025-05-23 ASSESSMENT — PAIN SCALES - WONG BAKER: WONGBAKER_NUMERICALRESPONSE: NO HURT

## 2025-05-23 ASSESSMENT — PAIN DESCRIPTION - LOCATION
LOCATION: KNEE

## 2025-05-23 NOTE — CARE COORDINATION
5/23 Call received from Roseanne at [facility], patient will need a hospital bed and a low air loss mattress in order to qualify for a wound vac.Electronically signed by Clotilde Rader RN on 5/23/2025 at 10:18 AM

## 2025-05-23 NOTE — PROGRESS NOTES
[facility]     Electrophysiology                                     Progress Note    Admission date:  2025    Reason for follow up visit: Atrial fibrillation    HPI/CC: Radha Carcamo is a 79 y.o. female who was admitted on 25 with sacral pain. Pertinent PMH including spinal stenosis. She is primarily nonambulatory and presented with unstageable sacral ulcer for which she underwent excisional debridement on .     During her hospitalization noted to have tachycardia and suspected atrial fibrillation which was not confirmed until . EP was consulted.     Subjective: She has no complaints this AM. Denies chest pain, palpitations, shortness of breath, and dizziness.     Vitals:  Blood pressure 129/71, pulse 71, temperature 98.3 °F (36.8 °C), temperature source Oral, resp. rate 14, height 1.6 m (5' 3\"), weight 62.5 kg (137 lb 12.6 oz), SpO2 93%.  Temp  Av.3 °F (36.8 °C)  Min: 98.2 °F (36.8 °C)  Max: 98.5 °F (36.9 °C)  Pulse  Av.1  Min: 71  Max: 124  BP  Min: 99/63  Max: 129/71  SpO2  Av.6 %  Min: 92 %  Max: 100 %    24 hour I/O    Intake/Output Summary (Last 24 hours) at 2025 0934  Last data filed at 2025 0606  Gross per 24 hour   Intake --   Output 2500 ml   Net -2500 ml     Current Facility-Administered Medications   Medication Dose Route Frequency Provider Last Rate Last Admin    tolvaptan (SAMSCA) tablet 15 mg  15 mg Oral Once Maggy Millard MD        polyethylene glycol (GLYCOLAX) packet 17 g  17 g Oral Daily Karan Radford MD   17 g at 25 0924    sulfur hexafluoride microspheres (LUMASON) 60.7-25 MG injection 2 mL  2 mL IntraVENous ONCE PRN Shun Harris MD        amiodarone (CORDARONE) tablet 400 mg  400 mg Oral BID Faustino Barker MD   400 mg at 25 0923    metoprolol succinate (TOPROL XL) extended release tablet 25 mg  25 mg Oral BID Faustino Barker MD   25 mg at 25 0923    apixaban (ELIQUIS) tablet 5 mg  5 mg Oral  Right ventricle size is normal. Normal systolic function. TAPSE is normal.    Mitral Valve: Mild regurgitation.    Tricuspid Valve: Mild regurgitation. Mildly elevated RVSP, consistent with mild pulmonary hypertension.    Left Atrium: Left atrium is mildly dilated.    Pericardium: Trivial pericardial effusion present.    Extracardiac: Small right pleural effusion.    Image quality is adequate. Technically difficult study.       All labs and testing reviewed.      Assessment:  1. Paroxysmal Atrial Fibrillation    - Diagnosed on EKG 5/22/25   - Asymptomatic    - CHADS-VASc 3 (Age, Gender)    - Eliquis 5mg BID      - Hgb 9.3 Hct 27.4, Creat 0.3 (5/24/25)   - Discussed multiple treatment options with Dr Barker. D/t current stage IV sacral ulcer unable to pursue ablation at this time, rhythm approach through medication management vs DCCV  - Toprol XL 25mg BID, will consolidate to daily dosing   - Amiodarone 400mg BID for 1wk then 200mg daily    - TSH 6 AST 14 ALT 6 (5/19/25)   - Yearly PFT and ophthalmology   - s/p GARCIA/DCCV with Dr Barker 5/23/25  - Remains sinus rhythm on tele     2. 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 Sacral ulcer    - Unstageable   - s/p debridement with general surgery 5/20       Plan:   Continue Amiodarone 400mg BID for 1wk then 200mg daily  Continue Eliquis 5mg BID  Discontinue Toprol XL 25mg BID  Start Toprol XL 50mg daily   Follow with EP outpatient       EP will sign off at this time   Follow up scheduled with Dr Barker 7/10/25  Assessment and plan discussed with Dr Barker, who is in agreement.     REESE Jackson-ROSA  [facility] [place]  [phone]

## 2025-05-23 NOTE — CARE COORDINATION
Wound Follow-up Progress Note       NAME:  Radha Carcamo  MEDICAL RECORD NUMBER:  [id number]  AGE:  79 y.o.   GENDER:  female  :  1945  TODAY'S DATE:  2025    Subjective  Pt seen for VAC dressing change.     Wound Identification:  Wound Type: pressure  Contributing Factors: chronic pressure, decreased mobility, and shear force    Objective        Patient Goal of Care:  Wound Healing     BP (!) 128/93   Pulse (!) 115   Temp 98 °F (36.7 °C) (Oral)   Resp 16   Ht 1.6 m (5' 3\")   Wt 68 kg (150 lb)   SpO2 94%   BMI 26.57 kg/m²   Dao Risk Score: Dao Scale Score: 13    Assessment    Measurements:  Negative Pressure Wound Therapy Sacrum (Active)   Dressing Status Removed (comment # of pieces) 25 1447   Canister changed? No 25 1447   Output (ml) 50 ml 25 0739   Unit Type [id number] 25 1447   Mode Continuous 25 1447   Target Pressure (mmHg) 125 25 1447   Intensity Medium 25 1447   $$ Dressing Changed & Charged $ Standard NPWT less than or equal to 50 sq cm PER TX 25 1447   Number of pieces placed 1 25 1447   Dressing Type Black foam 25 1447   Dressing Change Due 25 1447   Number of days: 2       Wound 25 Sacrum (Active)   Wound Image   25 1447   Wound Etiology Pressure Stage 4 25 1447   Dressing Status New dressing applied;Clean;Dry;Intact 25 1447   Wound Cleansed Not Cleansed 25   Dressing/Treatment Negative pressure wound therapy 25   Dressing Change Due 25 1246   Wound Length (cm) 8 cm 25 1246   Wound Width (cm) 4 cm 25 1246   Wound Depth (cm) 2.5 cm 25 1246   Wound Surface Area (cm^2) 32 cm^2 25 1246   Wound Volume (cm^3) 80 cm^3 25 1246   Undermining Starts ___ O'Clock 12 25 1246   Undermining Ends___ O'Clock 12 25 1246   Undermining Maxium Distance (cm) 2 25 1246   Wound

## 2025-05-23 NOTE — ANESTHESIA POSTPROCEDURE EVALUATION
Department of Anesthesiology  Postprocedure Note    Patient: Radha Carcamo  MRN: [id number]  YOB: 1945  Date of evaluation: 5/23/2025    Procedure Summary       Date: 05/20/25 Room / Location: [address]    Anesthesia Start: 1612 Anesthesia Stop: 1710    Procedure: EXCISIONAL DEBRIDEMENT SACRAL ULCER (Sacrum) Diagnosis:       Skin ulcer of sacrum, unspecified ulcer stage (HCC)      (Skin ulcer of sacrum, unspecified ulcer stage (HCC) [L98.429])    Surgeons: Milo Champion MD Responsible Provider: Trent Lucio MD    Anesthesia Type: general ASA Status: 3            Anesthesia Type: No value filed.    Carlos Phase I: Carlos Score: 9    Carlos Phase II:      Anesthesia Post Evaluation    Patient location during evaluation: PACU  Level of consciousness: awake and alert  Airway patency: patent  Nausea & Vomiting: no nausea and no vomiting  Cardiovascular status: blood pressure returned to baseline  Respiratory status: acceptable  Hydration status: euvolemic  Comments: Postoperative Anesthesia Note    Name:    Radha Carcamo  MRN:      [id number]    Patient Vitals in the past 12 hrs:  05/23/25 1553, Resp:14  05/23/25 1410, BP:(!) 128/93, Height:1.6 m (5' 3\"), Weight:68 kg (150 lb)  05/23/25 0929, Resp:16  05/23/25 0852, BP:(!) 128/93, Temp:98 °F (36.7 °C), Temp src:Oral, Pulse:(!) 115, Resp:18, SpO2:94 %  05/23/25 0827, Pulse:(!) 101, Resp:15, SpO2:96 %  05/23/25 0539, Resp:16  05/23/25 0532, Weight:63 kg (138 lb 14.2 oz)     LABS:    CBC  Lab Results       Component                Value               Date/Time                  WBC                      6.6                 05/23/2025 06:09 AM        HGB                      9.3 (L)             05/23/2025 06:09 AM        HCT                      27.4 (L)            05/23/2025 06:09 AM        PLT                      381                 05/23/2025 06:09 AM   RENAL  Lab Results       Component

## 2025-05-23 NOTE — PROGRESS NOTES
Patient ID: Radha Carcamo  Referring/ Physician: Karan Radford MD      Summary:   Radha Carcamo is being seen by nephrology for hyponatremia .     Reason for admission: sacral wound       Interval Hx:   Seen at bedside  /83  1600 mL urine yesterday.  Got tolvaptan 7.5 mg x 1 yesterday morning and lasix Iv 40 mg last evening  On Urena 15 g daily   Sodium 128 today  Potassium 3.8      Assessment/Plan:   - Continue Urena 15 g daily  - Replace potassium p.o.  - re-dose tolvaptan 15 mg today      Hyponatremia   Presenting with sodium of 124  Received IV fluids and sodium jacob to 131  Goal correction 4-6 meq in 24 hrs  Replace potassium     Hypokalemia   Replacing as needed.       Sacral ulcer  On antibiotics.         [facility] would like to thank you for the opportunity to serve this patient. Please call with any questions or concerns.    Maggy Millard MD  [facility]  [address] [place] [address]  Fax: [phone]  Office: [phone]    HPI  Radha Carcamo is a 79 y.o. female  with  has a past medical history of Spinal stenosis. who presented with sacral decubitus ulcer. .was found to have incidental finding of PE on chest CT. She was started on antibiotics and received IV fluids. Placed on heparin gtt. Nephrology consulted for hyponatremia with admitted sodium level of 124.      Review of Systems:   As above.     PMH/SH/FH:    Medical Hx: reviewed and updated as appropriate  Past Medical History:   Diagnosis Date    Spinal stenosis          Surgical Hx: reviewed and updated as appropriate   has a past surgical history that includes eye surgery and Rectal surgery (N/A, 5/20/2025).     Social Hx: reviewed and updated as appropriate  Social History     Tobacco Use    Smoking status: Former     Current packs/day: 1.00     Average packs/day: 1 pack/day for 10.0 years (10.0 ttl pk-yrs)     Types: Cigarettes    Smokeless tobacco: Never   Substance Use

## 2025-05-23 NOTE — PROGRESS NOTES
CV completed. SR on monitor. Patient tolerated procedure well. Carlos score 10. No current complaints, no distress noted. VSS. Report called to ZAYRA Haney. All questions answered at present time. Patient ready to be transferred back to room [facility].    Electronically signed by Enid Hanks RN on 5/23/2025 at 12:17 PM

## 2025-05-23 NOTE — PLAN OF CARE
Problem: Discharge Planning  Goal: Discharge to home or other facility with appropriate resources  Outcome: Progressing     Problem: Pain  Goal: Verbalizes/displays adequate comfort level or baseline comfort level  Outcome: Progressing     Problem: Skin/Tissue Integrity  Goal: Skin integrity remains intact  Description: 1.  Monitor for areas of redness and/or skin breakdown2.  Assess vascular access sites hourly3.  Every 4-6 hours minimum:  Change oxygen saturation probe site4.  Every 4-6 hours:  If on nasal continuous positive airway pressure, respiratory therapy assess nares and determine need for appliance change or resting period  Outcome: Progressing     Problem: Safety - Adult  Goal: Free from fall injury  Outcome: Progressing     Problem: ABCDS Injury Assessment  Goal: Absence of physical injury  Outcome: Progressing     Problem: Nutrition Deficit:  Goal: Optimize nutritional status  Outcome: Progressing

## 2025-05-23 NOTE — PROGRESS NOTES
Infectious Diseases   Progress Note      Admission Date: 5/19/2025  Hospital Day: Hospital Day: 5   Attending: Karan Radford MD  Date of service: 5/23/2025     Chief complaint/ Reason for consult:     Sacral decubitus ulcer wound infection-wound cultures growing MRSA and E. coli  Need for contact isolation  Bandemia  Right lower extremity pulmonary embolism  Lumbar spinal stenosis  Generalized weakness on admission    Microbiology:      I have reviewed allavailable micro lab data and cultures    Results       Procedure Component Value Units Date/Time    Culture, Surgical [[id number]]  (Abnormal)  (Susceptibility) Collected: 05/20/25 1631    Order Status: Completed Specimen: Specimen from Sacrum Updated: 05/23/25 1330     Gram Stain Result 3+ WBC's (Polymorphonuclear) present  2+ WBC's (Mononuclear) present  4+ Gram positive cocci  4+ Gram variable rods present       Anaerobic Culture --     Anaerobic culture further report to follow  No anaerobes isolated so far, Further report to follow  Ruling out Anaerobes, further report to follow       Organism Escherichia coli     Culture Surgical Heavy growth    Narrative:      ORDER#: [id number]                          ORDERED BY: TANG TYSON  SOURCE: Sacrum                             COLLECTED:  05/20/25 16:31  ANTIBIOTICS AT ANDR[facility]S.:                      RECEIVED :  05/20/25 16:56    Susceptibility        Escherichia coli      BACTERIAL SUSCEPTIBILITY PANEL BY TEJINDER      ampicillin <=2 mcg/mL Sensitive      ampicillin-sulbactam <=2 mcg/mL Sensitive      ceFAZolin <=1 mcg/mL Sensitive      cefepime <=0.12 mcg/mL Sensitive      cefTRIAXone <=0.25 mcg/mL Sensitive      ciprofloxacin <=0.06 mcg/mL Sensitive      ertapenem <=0.12 mcg/mL Sensitive      gentamicin <=1 mcg/mL Sensitive      levofloxacin <=0.12 mcg/mL Sensitive      meropenem <=0.25 mcg/mL Sensitive      piperacillin-tazobactam <=4 mcg/mL Sensitive      trimethoprim-sulfamethoxazole <=20 mcg/mL Sensitive  <=8/4 mcg/mL Resistant      ceFAZolin <=8 mcg/mL Resistant      clindamycin <=0.25 mcg/mL Sensitive      DAPTOmycin 1 mcg/mL Sensitive      erythromycin >4 mcg/mL Resistant      linezolid 4 mcg/mL Sensitive      oxacillin >2 mcg/mL Resistant      tetracycline <=4 mcg/mL Sensitive      trimethoprim-sulfamethoxazole <=0.5/9.5 mcg/mL Sensitive      vancomycin 1 mcg/mL Sensitive                           Blood Culture 2 [[id number]] Collected: 05/19/25 1543    Order Status: Completed Specimen: Blood Updated: 05/23/25 1715     Culture, Blood 2 No Growth after 4 days of incubation.    Narrative:      ORDER#: [id number]                          ORDERED BY: MAREK ARREGUIN  SOURCE: Blood                              COLLECTED:  05/19/25 15:43  ANTIBIOTICS AT ANDR[facility]S.:                      RECEIVED :  05/19/25 16:03  If child <=2 yrs old please draw pediatric bottle.~Blood Culture #2    Blood Culture 1 [[id number]] Collected: 05/19/25 1135    Order Status: Completed Specimen: Blood Updated: 05/23/25 1215     Blood Culture, Routine No Growth after 4 days of incubation.    Narrative:      ORDER#: [id number]                          ORDERED BY: MAREK ARREGUIN  SOURCE: Blood                              COLLECTED:  05/19/25 11:35  ANTIBIOTICS AT [facility]S.:                      RECEIVED :  05/19/25 11:42  If child <=2 yrs old please draw pediatric bottle.~Blood Culture 1             Susceptibility data from last 90 days.  Collected Specimen Info Organism Amoxicillin + Clavulanate Ampicillin Ampicillin + Sulbactam Cefazolin Cefepime Cefoxitin Ceftriaxone Cefuroxime Ciprofloxacin Clindamycin Daptomycin Ertapenem Erythromycin Gentamicin   05/20/25 Specimen from Sacrum Escherichia coli   S  S  S  S   S   S    S   S   05/20/25 Sacrum Proteus species  S  R  S  R  S  S  S  R  S    S   S     Methicillin-Resistant Staphylococcus aureus  R   R  R       S  S   R      Escherichia coli                   Collected Specimen Info Organism Levofloxacin

## 2025-05-23 NOTE — H&P
[facility]          Electrophysiology H&P Note       Date of Procedure: 5/23/2025  Patient's Name: Radha Carcamo  YOB: 1945   Medical Record Number: [id number]    Indication:  Atrial fibrillation with RVR  Cardioversion    Consent:   I have discussed with the patient and/or the patient representative the indication, alternatives, and the possible risks and/or complications of the planned procedure and the anesthesia methods. The patient and/or patient representative appear to understand and agree to proceed.    Past Medical History:   Diagnosis Date    Spinal stenosis        Past Surgical History:   Procedure Laterality Date    EYE SURGERY      cataract ou    RECTAL SURGERY N/A 5/20/2025    EXCISIONAL DEBRIDEMENT SACRAL ULCER performed by Milo Champion MD at [facility] OR       Prior to Admission medications    Medication Sig Start Date End Date Taking? Authorizing Provider   gabapentin (NEURONTIN) 100 MG capsule Take 1 capsule by mouth 3 times daily.    ProviderSindy MD   ibuprofen (ADVIL;MOTRIN) 400 MG tablet Take 1-2 tablets by mouth every 6 hours as needed for Pain or Fever    Provider, MD Sindy         Pre-sedation Documentation and Exam:  I have personally completed a history, physical exam & review of systems for this patient (see notes).    BP 99/63   Pulse 81   Resp 13   SpO2 100%   NAD  Chest clear non labored  Heart tachycardic, irregular rhythm  Abd soft non tender  No focal neuro deficits     Mallampati Airway Assessment:  II     ASA Classification:  Class III - A patient with severe systemic disease that limits activity but is not incapacitating     Sedation/Anesthesia Plan:  Moderate      Medications Planned:  Brevital intravenously       Faustino Barker MD  [facility]   Office: [phone]  Fax: [phone]

## 2025-05-23 NOTE — PROGRESS NOTES
or renal calculi. GI/Bowel: Limited evaluation of the bowel due to significant motion artifact. No discrete bowel injury is identified. Pelvis: Limited evaluation of the pelvis. The bladder is decompressed with Keenan catheter in place. Peritoneum/Retroperitoneum: No retroperitoneal, mesenteric, or pelvic lymphadenopathy.  No free fluid or pneumoperitoneum.  Moderate atherosclerosis of the abdominal aorta and iliac arteries without aneurysm. Bones/Soft Tissues: Diffuse osseous demineralization.  There is a sacral decubitus ulcer without visualized underlying osteomyelitis. Moderate body wall edema. Left total hip arthroplasty. THORACOLUMBAR SPINE: BONES/ALIGNMENT: Mild superior endplate deformities of the T4, L2, and L5 favored nonacute.  Mild levoconvex curvature of the lumbar spine. DEGENERATIVE CHANGES: Degenerative changes are seen throughout the thoracic and lumbar spine.  There is moderate to severe degenerative disc disease from the L1-S1 levels.  Extensive bilateral facet arthropathy.  There is at least moderate spinal canal narrowing at the L3-L4 level and at the L5-S1 level. SOFT TISSUES: No paraspinal mass is seen.     1. Pulmonary emboli are seen in the right lower lobar pulmonary artery extending to segmental branch and in a left upper lobe segmental branch pulmonary artery. No convincing evidence of right heart strain. 2. Prominent intrahepatic and extrahepatic bile ducts, likely representing reservoir effect in the setting of prior cholecystectomy.  Consider correlation with LFTs. 3. Mild superior endplate deformities of the T4, L2, and L5 vertebral bodies are favored nonacute. Correlate with clinical symptoms. 4. Sacral decubitus ulcer without visualized underlying osteomyelitis. 5. Moderate body wall edema. 6. Note limited evaluation of the abdomen and pelvis due to motion artifact despite repeat scan. Discussed with Dr. Yeison Ledbetter by Dr. Prado at 2:31 PM on 5/19/2025     CT CERVICAL SPINE WO  CONTRAST  Result Date: 5/19/2025  EXAMINATION: CT OF THE CERVICAL SPINE WITHOUT CONTRAST 5/19/2025 12:25 pm TECHNIQUE: CT of the cervical spine was performed without the administration of intravenous contrast. Multiplanar reformatted images are provided for review. Automated exposure control, iterative reconstruction, and/or weight based adjustment of the mA/kV was utilized to reduce the radiation dose to as low as reasonably achievable. COMPARISON: None. HISTORY: ORDERING SYSTEM PROVIDED HISTORY: fall neck pain TECHNOLOGIST PROVIDED HISTORY: Reason for exam:->fall neck pain Decision Support Exception - unselect if not a suspected or confirmed emergency medical condition->Emergency Medical Condition (MA) Reason for Exam: fall, neck and back pain, large sacral decubitis ulcer cellulitis FINDINGS: BONES/ALIGNMENT: There is no acute fracture or traumatic malalignment. DEGENERATIVE CHANGES: No severe osseous spinal canal stenosis.  Multilevel degenerative changes. SOFT TISSUES: There is no prevertebral soft tissue swelling.     No acute abnormality of the cervical spine.     CT HEAD WO CONTRAST  Result Date: 5/19/2025  EXAM: CT HEAD WITHOUT 05/19/2025 01:06:39 PM TECHNIQUE: CT of the head was performed without the administration of intravenous contrast. Automated exposure control, iterative reconstruction, and/or weight based adjustment of the mA/kV was utilized to reduce the radiation dose to as low as reasonably achievable. COMPARISON: None available. CLINICAL HISTORY: Fall head injury. FINDINGS: BRAIN AND VENTRICLES: There is no acute intracranial hemorrhage, mass effect or midline shift. No abnormal extra-axial fluid collection. The gray-white differentiation is maintained without evidence of an acute infarct. There is no evidence of hydrocephalus. Mild periventricular hypoattenuation, likely representing mild chronic small vessel ischemic changes. ORBITS: The visualized portion of the orbits demonstrate no acute

## 2025-05-23 NOTE — CARE COORDINATION
Spoke with patient about going to a facility. Patient is agreeable to going to a SNF. She had 2 choices and referrals were sent to [facility] and [facility].     Patient wanted to stay near [facility] so her boyfriend can visit her.     Per rounds, patient likely to DC tomorrow if medically stable. Will need precert/ HENS/ transport scheduled.     Electronically signed by Brittani Shaw RN on 5/23/2025 at 3:14 PM    Home at [facility] is unable to accept this patient, out of network.    Electronically signed by Brittani Shaw RN on 5/23/2025 at 3:33 PM

## 2025-05-23 NOTE — PROGRESS NOTES
General Surgery  Daily Progress Note    Pt Name: Radha Carcamo  Medical Record Number: [id number]  Date of Birth 1945   Today's Date: 5/23/2025    Chief Complaint   Patient presents with    Leg Pain     Patient to ER via EMS, she states she is her today because her boyfriend is no longer able to care for her. Bilat leg pain and swelling since November.        ASSESSMENT/PLAN  Unstageable sacral ulcer s/p debridement POD #3   Wound Vac in place working well. Change wound VAC today  Having some nausea and emesis this am. Controlled with medication   Continue antibiotics per ID  PE - heparin gtt resumed.  Hgb stable      SUBJECTIVE  Radha is improved from yesterday. Pain is better controlled. She has some nausea and no vomiting. She is tolerating a dysphagia diet . Current activity is ad teri    OBJECTIVE  VITALS:  height is 1.6 m (5' 2.99\") and weight is 63 kg (138 lb 14.2 oz). Her oral temperature is 98 °F (36.7 °C). Her blood pressure is 128/93 (abnormal) and her pulse is 115 (abnormal). Her respiration is 16 and oxygen saturation is 94%.   GENERAL: alert, no distress  LUNGS: normal respiratory effort, no accessory muscle use  ABDOMEN: soft, non-tender and non-distended  Sacral ulcer wound VAC in place  EXTREMITY: no cyanosis and no clubbing  I/O last 3 completed shifts:  In: 2555.3 [P.O.:680; I.V.:616.6; IV Piggyback:1258.6]  Out: 2125 [Urine:1975; Drains:150]  No intake/output data recorded.    LABS  Recent Labs     05/23/25  0609   WBC 6.6   HGB 9.3*   HCT 27.4*      *   K 3.8   CL 93*   CO2 27   BUN 12   CREATININE 0.3*   PHOS 2.6   CALCIUM 8.9   CBC with Differential:    Lab Results   Component Value Date/Time    WBC 6.6 05/23/2025 06:09 AM    RBC 2.85 05/23/2025 06:09 AM    HGB 9.3 05/23/2025 06:09 AM    HCT 27.4 05/23/2025 06:09 AM     05/23/2025 06:09 AM    MCV 96.0 05/23/2025 06:09 AM    MCH 32.6 05/23/2025 06:09 AM    MCHC 33.9 05/23/2025 06:09 AM    RDW 12.5 05/23/2025

## 2025-05-24 LAB
ANION GAP SERPL CALCULATED.3IONS-SCNC: 6 MMOL/L (ref 3–16)
BUN SERPL-MCNC: 17 MG/DL (ref 7–20)
CALCIUM SERPL-MCNC: 8.7 MG/DL (ref 8.3–10.6)
CHLORIDE SERPL-SCNC: 95 MMOL/L (ref 99–110)
CO2 SERPL-SCNC: 28 MMOL/L (ref 21–32)
CREAT SERPL-MCNC: 0.3 MG/DL (ref 0.6–1.2)
GFR SERPLBLD CREATININE-BSD FMLA CKD-EPI: >90 ML/MIN/{1.73_M2}
GLUCOSE SERPL-MCNC: 96 MG/DL (ref 70–99)
POTASSIUM SERPL-SCNC: 3.6 MMOL/L (ref 3.5–5.1)
SODIUM SERPL-SCNC: 129 MMOL/L (ref 136–145)

## 2025-05-24 PROCEDURE — 80048 BASIC METABOLIC PNL TOTAL CA: CPT

## 2025-05-24 PROCEDURE — 94760 N-INVAS EAR/PLS OXIMETRY 1: CPT

## 2025-05-24 PROCEDURE — 6370000000 HC RX 637 (ALT 250 FOR IP): Performed by: SURGERY

## 2025-05-24 PROCEDURE — 6370000000 HC RX 637 (ALT 250 FOR IP): Performed by: INTERNAL MEDICINE

## 2025-05-24 PROCEDURE — 6360000002 HC RX W HCPCS: Performed by: SURGERY

## 2025-05-24 PROCEDURE — 99232 SBSQ HOSP IP/OBS MODERATE 35: CPT

## 2025-05-24 PROCEDURE — 6370000000 HC RX 637 (ALT 250 FOR IP): Performed by: HOSPITALIST

## 2025-05-24 PROCEDURE — 6360000002 HC RX W HCPCS: Performed by: INTERNAL MEDICINE

## 2025-05-24 PROCEDURE — 36415 COLL VENOUS BLD VENIPUNCTURE: CPT

## 2025-05-24 PROCEDURE — 6370000000 HC RX 637 (ALT 250 FOR IP)

## 2025-05-24 PROCEDURE — 2500000003 HC RX 250 WO HCPCS: Performed by: SURGERY

## 2025-05-24 PROCEDURE — 2580000003 HC RX 258: Performed by: INTERNAL MEDICINE

## 2025-05-24 PROCEDURE — 6370000000 HC RX 637 (ALT 250 FOR IP): Performed by: STUDENT IN AN ORGANIZED HEALTH CARE EDUCATION/TRAINING PROGRAM

## 2025-05-24 PROCEDURE — 2060000000 HC ICU INTERMEDIATE R&B

## 2025-05-24 PROCEDURE — 6370000000 HC RX 637 (ALT 250 FOR IP): Performed by: NURSE PRACTITIONER

## 2025-05-24 RX ORDER — METOPROLOL SUCCINATE 25 MG/1
25 TABLET, EXTENDED RELEASE ORAL ONCE
Status: COMPLETED | OUTPATIENT
Start: 2025-05-24 | End: 2025-05-24

## 2025-05-24 RX ORDER — TOLVAPTAN 15 MG/1
15 TABLET ORAL ONCE
Status: COMPLETED | OUTPATIENT
Start: 2025-05-24 | End: 2025-05-24

## 2025-05-24 RX ORDER — HYDROMORPHONE HYDROCHLORIDE 2 MG/1
1 TABLET ORAL EVERY 4 HOURS PRN
Refills: 0 | Status: DISCONTINUED | OUTPATIENT
Start: 2025-05-24 | End: 2025-05-30 | Stop reason: HOSPADM

## 2025-05-24 RX ORDER — METOPROLOL SUCCINATE 50 MG/1
50 TABLET, EXTENDED RELEASE ORAL DAILY
Status: DISCONTINUED | OUTPATIENT
Start: 2025-05-24 | End: 2025-05-24

## 2025-05-24 RX ORDER — METOPROLOL SUCCINATE 50 MG/1
50 TABLET, EXTENDED RELEASE ORAL DAILY
Status: DISCONTINUED | OUTPATIENT
Start: 2025-05-25 | End: 2025-05-30 | Stop reason: HOSPADM

## 2025-05-24 RX ADMIN — GABAPENTIN 100 MG: 100 CAPSULE ORAL at 20:11

## 2025-05-24 RX ADMIN — SODIUM CHLORIDE, PRESERVATIVE FREE 10 ML: 5 INJECTION INTRAVENOUS at 09:23

## 2025-05-24 RX ADMIN — HYDROMORPHONE HYDROCHLORIDE 1 MG: 2 TABLET ORAL at 16:01

## 2025-05-24 RX ADMIN — HYDROMORPHONE HYDROCHLORIDE 1 MG: 2 TABLET ORAL at 12:06

## 2025-05-24 RX ADMIN — SODIUM CHLORIDE, PRESERVATIVE FREE 10 ML: 5 INJECTION INTRAVENOUS at 20:16

## 2025-05-24 RX ADMIN — ACETAMINOPHEN 1000 MG: 500 TABLET ORAL at 21:54

## 2025-05-24 RX ADMIN — APIXABAN 5 MG: 5 TABLET, FILM COATED ORAL at 20:11

## 2025-05-24 RX ADMIN — GABAPENTIN 100 MG: 100 CAPSULE ORAL at 09:23

## 2025-05-24 RX ADMIN — CEFTAROLINE FOSAMIL 600 MG: 600 POWDER, FOR SOLUTION INTRAVENOUS at 20:06

## 2025-05-24 RX ADMIN — ONDANSETRON 4 MG: 2 INJECTION, SOLUTION INTRAMUSCULAR; INTRAVENOUS at 10:17

## 2025-05-24 RX ADMIN — HYDROMORPHONE HYDROCHLORIDE 1 MG: 1 INJECTION, SOLUTION INTRAMUSCULAR; INTRAVENOUS; SUBCUTANEOUS at 05:56

## 2025-05-24 RX ADMIN — TOLVAPTAN 15 MG: 15 TABLET ORAL at 11:05

## 2025-05-24 RX ADMIN — ACETAMINOPHEN 1000 MG: 500 TABLET ORAL at 14:39

## 2025-05-24 RX ADMIN — POLYETHYLENE GLYCOL 3350 17 G: 17 POWDER, FOR SOLUTION ORAL at 09:24

## 2025-05-24 RX ADMIN — AMIODARONE HYDROCHLORIDE 400 MG: 200 TABLET ORAL at 09:23

## 2025-05-24 RX ADMIN — HYDROMORPHONE HYDROCHLORIDE 1 MG: 1 INJECTION, SOLUTION INTRAMUSCULAR; INTRAVENOUS; SUBCUTANEOUS at 09:21

## 2025-05-24 RX ADMIN — DICLOFENAC SODIUM 4 G: 10 GEL TOPICAL at 16:02

## 2025-05-24 RX ADMIN — ACETAMINOPHEN 1000 MG: 500 TABLET ORAL at 05:55

## 2025-05-24 RX ADMIN — CEFTAROLINE FOSAMIL 600 MG: 600 POWDER, FOR SOLUTION INTRAVENOUS at 11:09

## 2025-05-24 RX ADMIN — Medication 6 MG: at 20:11

## 2025-05-24 RX ADMIN — METOPROLOL SUCCINATE 25 MG: 25 TABLET, EXTENDED RELEASE ORAL at 20:11

## 2025-05-24 RX ADMIN — APIXABAN 5 MG: 5 TABLET, FILM COATED ORAL at 09:24

## 2025-05-24 RX ADMIN — Medication 15 G: at 09:24

## 2025-05-24 RX ADMIN — METRONIDAZOLE 500 MG: 500 INJECTION, SOLUTION INTRAVENOUS at 17:46

## 2025-05-24 RX ADMIN — GABAPENTIN 100 MG: 100 CAPSULE ORAL at 14:18

## 2025-05-24 RX ADMIN — HYDROMORPHONE HYDROCHLORIDE 1 MG: 1 INJECTION, SOLUTION INTRAMUSCULAR; INTRAVENOUS; SUBCUTANEOUS at 00:24

## 2025-05-24 RX ADMIN — METRONIDAZOLE 500 MG: 500 INJECTION, SOLUTION INTRAVENOUS at 09:31

## 2025-05-24 RX ADMIN — HYDROMORPHONE HYDROCHLORIDE 1 MG: 2 TABLET ORAL at 20:10

## 2025-05-24 RX ADMIN — AMIODARONE HYDROCHLORIDE 400 MG: 200 TABLET ORAL at 20:11

## 2025-05-24 RX ADMIN — METOPROLOL SUCCINATE 25 MG: 25 TABLET, EXTENDED RELEASE ORAL at 09:23

## 2025-05-24 RX ADMIN — METRONIDAZOLE 500 MG: 500 INJECTION, SOLUTION INTRAVENOUS at 00:29

## 2025-05-24 ASSESSMENT — PAIN DESCRIPTION - DESCRIPTORS
DESCRIPTORS: ACHING
DESCRIPTORS: ACHING;DISCOMFORT
DESCRIPTORS: ACHING;DISCOMFORT
DESCRIPTORS: DISCOMFORT

## 2025-05-24 ASSESSMENT — PAIN SCALES - GENERAL
PAINLEVEL_OUTOF10: 7
PAINLEVEL_OUTOF10: 7
PAINLEVEL_OUTOF10: 10
PAINLEVEL_OUTOF10: 8
PAINLEVEL_OUTOF10: 9
PAINLEVEL_OUTOF10: 8
PAINLEVEL_OUTOF10: 8
PAINLEVEL_OUTOF10: 5
PAINLEVEL_OUTOF10: 1
PAINLEVEL_OUTOF10: 10
PAINLEVEL_OUTOF10: 10
PAINLEVEL_OUTOF10: 8
PAINLEVEL_OUTOF10: 3
PAINLEVEL_OUTOF10: 0
PAINLEVEL_OUTOF10: 7
PAINLEVEL_OUTOF10: 10
PAINLEVEL_OUTOF10: 6

## 2025-05-24 ASSESSMENT — PAIN - FUNCTIONAL ASSESSMENT
PAIN_FUNCTIONAL_ASSESSMENT: ACTIVITIES ARE NOT PREVENTED
PAIN_FUNCTIONAL_ASSESSMENT: PREVENTS OR INTERFERES SOME ACTIVE ACTIVITIES AND ADLS
PAIN_FUNCTIONAL_ASSESSMENT: ACTIVITIES ARE NOT PREVENTED

## 2025-05-24 ASSESSMENT — PAIN DESCRIPTION - LOCATION
LOCATION: BUTTOCKS
LOCATION: BUTTOCKS;KNEE
LOCATION: BUTTOCKS

## 2025-05-24 ASSESSMENT — PAIN DESCRIPTION - ORIENTATION
ORIENTATION: ANTERIOR
ORIENTATION: LOWER
ORIENTATION: LEFT;LOWER;RIGHT

## 2025-05-24 ASSESSMENT — PAIN SCALES - WONG BAKER
WONGBAKER_NUMERICALRESPONSE: NO HURT
WONGBAKER_NUMERICALRESPONSE: NO HURT

## 2025-05-24 NOTE — PROGRESS NOTES
Patient ID: Radha Carcamo  Referring/ Physician: Karan Radford MD      Summary:   Radha Carcamo is being seen by nephrology for hyponatremia .     Reason for admission: sacral wound       Interval Hx:   Seen at bedside  /71  2500 mL urine yesterday with tolvaptan 15 mg x 1 yesterday  On Urena 15 g daily   Sodium 129 today. Up by only 1 point.  Potassium 3.6      Assessment/Plan:   - Continue Urena 15 g daily  - Replace potassium p.o.  - re-dose tolvaptan 15 mg today  - hyponatremia slowly improving  - high protein diet.      Hyponatremia   Presenting with sodium of 124  Received IV fluids and sodium jacob to 131  Goal correction 4-6 meq in 24 hrs  Replace potassium     Hypokalemia   Replacing as needed.       Sacral ulcer  On antibiotics.         [facility] would like to thank you for the opportunity to serve this patient. Please call with any questions or concerns.    Maggy Millard MD  [facility]  [address] [phone]  Fax: [phone]  Office: [phone]    [facility]  Radha Carcamo is a 79 y.o. female  with  has a past medical history of Spinal stenosis. who presented with sacral decubitus ulcer. .was found to have incidental finding of PE on chest CT. She was started on antibiotics and received IV fluids. Placed on heparin gtt. Nephrology consulted for hyponatremia with admitted sodium level of 124.      Review of Systems:   As above.     PMH/SH/FH:    Medical Hx: reviewed and updated as appropriate  Past Medical History:   Diagnosis Date    Spinal stenosis          Surgical Hx: reviewed and updated as appropriate   has a past surgical history that includes eye surgery and Rectal surgery (N/A, 5/20/2025).     Social Hx: reviewed and updated as appropriate  Social History     Tobacco Use    Smoking status: Former     Current packs/day: 1.00     Average packs/day: 1 pack/day for 10.0 years (10.0 ttl pk-yrs)     Types: Cigarettes

## 2025-05-24 NOTE — PROGRESS NOTES
unremarkable.  Mildly prominent intrahepatic and extrahepatic bile ducts, likely representing reservoir effect in the setting of prior cholecystectomy.  The spleen, pancreas, and adrenal glands are unremarkable.  No suspicious renal lesion.  No hydronephrosis or renal calculi. GI/Bowel: Limited evaluation of the bowel due to significant motion artifact. No discrete bowel injury is identified. Pelvis: Limited evaluation of the pelvis. The bladder is decompressed with Keenan catheter in place. Peritoneum/Retroperitoneum: No retroperitoneal, mesenteric, or pelvic lymphadenopathy.  No free fluid or pneumoperitoneum.  Moderate atherosclerosis of the abdominal aorta and iliac arteries without aneurysm. Bones/Soft Tissues: Diffuse osseous demineralization.  There is a sacral decubitus ulcer without visualized underlying osteomyelitis. Moderate body wall edema. Left total hip arthroplasty. THORACOLUMBAR SPINE: BONES/ALIGNMENT: Mild superior endplate deformities of the T4, L2, and L5 favored nonacute.  Mild levoconvex curvature of the lumbar spine. DEGENERATIVE CHANGES: Degenerative changes are seen throughout the thoracic and lumbar spine.  There is moderate to severe degenerative disc disease from the L1-S1 levels.  Extensive bilateral facet arthropathy.  There is at least moderate spinal canal narrowing at the L3-L4 level and at the L5-S1 level. SOFT TISSUES: No paraspinal mass is seen.     1. Pulmonary emboli are seen in the right lower lobar pulmonary artery extending to segmental branch and in a left upper lobe segmental branch pulmonary artery. No convincing evidence of right heart strain. 2. Prominent intrahepatic and extrahepatic bile ducts, likely representing reservoir effect in the setting of prior cholecystectomy.  Consider correlation with LFTs. 3. Mild superior endplate deformities of the T4, L2, and L5 vertebral bodies are favored nonacute. Correlate with clinical symptoms. 4. Sacral decubitus ulcer without  effect in the setting of prior cholecystectomy.  The spleen, pancreas, and adrenal glands are unremarkable.  No suspicious renal lesion.  No hydronephrosis or renal calculi. GI/Bowel: Limited evaluation of the bowel due to significant motion artifact. No discrete bowel injury is identified. Pelvis: Limited evaluation of the pelvis. The bladder is decompressed with Keenan catheter in place. Peritoneum/Retroperitoneum: No retroperitoneal, mesenteric, or pelvic lymphadenopathy.  No free fluid or pneumoperitoneum.  Moderate atherosclerosis of the abdominal aorta and iliac arteries without aneurysm. Bones/Soft Tissues: Diffuse osseous demineralization.  There is a sacral decubitus ulcer without visualized underlying osteomyelitis. Moderate body wall edema. Left total hip arthroplasty. THORACOLUMBAR SPINE: BONES/ALIGNMENT: Mild superior endplate deformities of the T4, L2, and L5 favored nonacute.  Mild levoconvex curvature of the lumbar spine. DEGENERATIVE CHANGES: Degenerative changes are seen throughout the thoracic and lumbar spine.  There is moderate to severe degenerative disc disease from the L1-S1 levels.  Extensive bilateral facet arthropathy.  There is at least moderate spinal canal narrowing at the L3-L4 level and at the L5-S1 level. SOFT TISSUES: No paraspinal mass is seen.     1. Pulmonary emboli are seen in the right lower lobar pulmonary artery extending to segmental branch and in a left upper lobe segmental branch pulmonary artery. No convincing evidence of right heart strain. 2. Prominent intrahepatic and extrahepatic bile ducts, likely representing reservoir effect in the setting of prior cholecystectomy.  Consider correlation with LFTs. 3. Mild superior endplate deformities of the T4, L2, and L5 vertebral bodies are favored nonacute. Correlate with clinical symptoms. 4. Sacral decubitus ulcer without visualized underlying osteomyelitis. 5. Moderate body wall edema. 6. Note limited evaluation of the

## 2025-05-25 LAB
BACTERIA SPEC AEROBE CULT: ABNORMAL
BACTERIA SPEC ANAEROBE CULT: ABNORMAL
BACTERIA SPEC ANAEROBE CULT: ABNORMAL
DEPRECATED RDW RBC AUTO: 12.7 % (ref 12.4–15.4)
GRAM STN SPEC: ABNORMAL
HCT VFR BLD AUTO: 27.3 % (ref 36–48)
HGB BLD-MCNC: 9.2 G/DL (ref 12–16)
MCH RBC QN AUTO: 32.8 PG (ref 26–34)
MCHC RBC AUTO-ENTMCNC: 33.9 G/DL (ref 31–36)
MCV RBC AUTO: 96.7 FL (ref 80–100)
ORGANISM: ABNORMAL
PLATELET # BLD AUTO: 457 K/UL (ref 135–450)
PMV BLD AUTO: 6.8 FL (ref 5–10.5)
RBC # BLD AUTO: 2.82 M/UL (ref 4–5.2)
WBC # BLD AUTO: 6.4 K/UL (ref 4–11)

## 2025-05-25 PROCEDURE — 6370000000 HC RX 637 (ALT 250 FOR IP): Performed by: SURGERY

## 2025-05-25 PROCEDURE — 85027 COMPLETE CBC AUTOMATED: CPT

## 2025-05-25 PROCEDURE — 2580000003 HC RX 258: Performed by: INTERNAL MEDICINE

## 2025-05-25 PROCEDURE — 6370000000 HC RX 637 (ALT 250 FOR IP): Performed by: INTERNAL MEDICINE

## 2025-05-25 PROCEDURE — 2500000003 HC RX 250 WO HCPCS: Performed by: SURGERY

## 2025-05-25 PROCEDURE — 36415 COLL VENOUS BLD VENIPUNCTURE: CPT

## 2025-05-25 PROCEDURE — 6370000000 HC RX 637 (ALT 250 FOR IP): Performed by: NURSE PRACTITIONER

## 2025-05-25 PROCEDURE — 6360000002 HC RX W HCPCS: Performed by: SURGERY

## 2025-05-25 PROCEDURE — 6370000000 HC RX 637 (ALT 250 FOR IP)

## 2025-05-25 PROCEDURE — 94760 N-INVAS EAR/PLS OXIMETRY 1: CPT

## 2025-05-25 PROCEDURE — 6360000002 HC RX W HCPCS: Performed by: INTERNAL MEDICINE

## 2025-05-25 PROCEDURE — 2060000000 HC ICU INTERMEDIATE R&B

## 2025-05-25 PROCEDURE — 6370000000 HC RX 637 (ALT 250 FOR IP): Performed by: HOSPITALIST

## 2025-05-25 RX ADMIN — METRONIDAZOLE 500 MG: 500 INJECTION, SOLUTION INTRAVENOUS at 10:14

## 2025-05-25 RX ADMIN — HYDROMORPHONE HYDROCHLORIDE 1 MG: 2 TABLET ORAL at 20:26

## 2025-05-25 RX ADMIN — HYDROMORPHONE HYDROCHLORIDE 1 MG: 2 TABLET ORAL at 16:01

## 2025-05-25 RX ADMIN — METRONIDAZOLE 500 MG: 500 INJECTION, SOLUTION INTRAVENOUS at 01:10

## 2025-05-25 RX ADMIN — ACETAMINOPHEN 1000 MG: 500 TABLET ORAL at 21:12

## 2025-05-25 RX ADMIN — AMIODARONE HYDROCHLORIDE 400 MG: 200 TABLET ORAL at 20:26

## 2025-05-25 RX ADMIN — HYDROMORPHONE HYDROCHLORIDE 1 MG: 2 TABLET ORAL at 11:43

## 2025-05-25 RX ADMIN — METRONIDAZOLE 500 MG: 500 INJECTION, SOLUTION INTRAVENOUS at 16:48

## 2025-05-25 RX ADMIN — ACETAMINOPHEN 1000 MG: 500 TABLET ORAL at 05:56

## 2025-05-25 RX ADMIN — APIXABAN 5 MG: 5 TABLET, FILM COATED ORAL at 20:26

## 2025-05-25 RX ADMIN — GABAPENTIN 100 MG: 100 CAPSULE ORAL at 14:17

## 2025-05-25 RX ADMIN — HYDROMORPHONE HYDROCHLORIDE 1 MG: 2 TABLET ORAL at 01:06

## 2025-05-25 RX ADMIN — CEFTAROLINE FOSAMIL 600 MG: 600 POWDER, FOR SOLUTION INTRAVENOUS at 20:20

## 2025-05-25 RX ADMIN — GABAPENTIN 100 MG: 100 CAPSULE ORAL at 20:26

## 2025-05-25 RX ADMIN — Medication 6 MG: at 20:26

## 2025-05-25 RX ADMIN — DICLOFENAC SODIUM 4 G: 10 GEL TOPICAL at 00:29

## 2025-05-25 RX ADMIN — Medication 15 G: at 08:51

## 2025-05-25 RX ADMIN — APIXABAN 5 MG: 5 TABLET, FILM COATED ORAL at 08:51

## 2025-05-25 RX ADMIN — DICLOFENAC SODIUM 4 G: 10 GEL TOPICAL at 11:44

## 2025-05-25 RX ADMIN — METOPROLOL SUCCINATE 50 MG: 50 TABLET, EXTENDED RELEASE ORAL at 08:50

## 2025-05-25 RX ADMIN — AMIODARONE HYDROCHLORIDE 400 MG: 200 TABLET ORAL at 08:51

## 2025-05-25 RX ADMIN — SODIUM CHLORIDE, PRESERVATIVE FREE 10 ML: 5 INJECTION INTRAVENOUS at 08:51

## 2025-05-25 RX ADMIN — POLYETHYLENE GLYCOL 3350 17 G: 17 POWDER, FOR SOLUTION ORAL at 08:50

## 2025-05-25 RX ADMIN — GABAPENTIN 100 MG: 100 CAPSULE ORAL at 08:51

## 2025-05-25 RX ADMIN — ACETAMINOPHEN 1000 MG: 500 TABLET ORAL at 14:17

## 2025-05-25 RX ADMIN — HYDROMORPHONE HYDROCHLORIDE 1 MG: 2 TABLET ORAL at 06:15

## 2025-05-25 RX ADMIN — CEFTAROLINE FOSAMIL 600 MG: 600 POWDER, FOR SOLUTION INTRAVENOUS at 08:53

## 2025-05-25 ASSESSMENT — PAIN SCALES - GENERAL
PAINLEVEL_OUTOF10: 7
PAINLEVEL_OUTOF10: 8
PAINLEVEL_OUTOF10: 9
PAINLEVEL_OUTOF10: 9
PAINLEVEL_OUTOF10: 10
PAINLEVEL_OUTOF10: 8
PAINLEVEL_OUTOF10: 9
PAINLEVEL_OUTOF10: 8
PAINLEVEL_OUTOF10: 10
PAINLEVEL_OUTOF10: 8
PAINLEVEL_OUTOF10: 8
PAINLEVEL_OUTOF10: 9
PAINLEVEL_OUTOF10: 8
PAINLEVEL_OUTOF10: 7
PAINLEVEL_OUTOF10: 10

## 2025-05-25 ASSESSMENT — PAIN DESCRIPTION - ORIENTATION
ORIENTATION: LEFT;RIGHT
ORIENTATION: MID

## 2025-05-25 ASSESSMENT — PAIN DESCRIPTION - LOCATION
LOCATION: BUTTOCKS
LOCATION: KNEE
LOCATION: KNEE
LOCATION: KNEE;BUTTOCKS
LOCATION: BUTTOCKS;KNEE
LOCATION: KNEE;SACRUM
LOCATION: BUTTOCKS;KNEE

## 2025-05-25 ASSESSMENT — PAIN DESCRIPTION - DESCRIPTORS
DESCRIPTORS: ACHING;DISCOMFORT
DESCRIPTORS: DISCOMFORT
DESCRIPTORS: ACHING;DISCOMFORT

## 2025-05-25 ASSESSMENT — PAIN - FUNCTIONAL ASSESSMENT
PAIN_FUNCTIONAL_ASSESSMENT: ACTIVITIES ARE NOT PREVENTED

## 2025-05-25 NOTE — PLAN OF CARE
Problem: Discharge Planning  Goal: Discharge to home or other facility with appropriate resources  5/25/2025 1228 by Randa Chávez RN  Outcome: Progressing     Problem: Pain  Goal: Verbalizes/displays adequate comfort level or baseline comfort level  5/25/2025 1228 by Randa Chávez RN  Outcome: Progressing     Problem: Skin/Tissue Integrity  Goal: Skin integrity remains intact  Description: 1.  Monitor for areas of redness and/or skin breakdown2.  Assess vascular access sites hourly3.  Every 4-6 hours minimum:  Change oxygen saturation probe site4.  Every 4-6 hours:  If on nasal continuous positive airway pressure, respiratory therapy assess nares and determine need for appliance change or resting period  5/25/2025 1228 by Randa Chávez RN  Outcome: Progressing  Flowsheets (Taken 5/25/2025 0719)  Skin Integrity Remains Intact: Monitor for areas of redness and/or skin breakdown     Problem: Safety - Adult  Goal: Free from fall injury  5/25/2025 1228 by Randa Chávez RN  Outcome: Progressing     Problem: ABCDS Injury Assessment  Goal: Absence of physical injury  5/25/2025 1228 by Randa Chávez, RN  Outcome: Progressing     Problem: Nutrition Deficit:  Goal: Optimize nutritional status  5/25/2025 1228 by Randa Chávez, RN  Outcome: Progressing

## 2025-05-25 NOTE — PROGRESS NOTES
Patient ID: Radha Carcamo  Referring/ Physician: Karan Radford MD      Summary:   Radha Carcamo is being seen by nephrology for hyponatremia .     Reason for admission: sacral wound       Interval Hx:   Seen at bedside  /87  1650 mL urine yesterday with tolvaptan 15 mg x 1 yesterday  On Urena 15 g daily   Sodium 129 yesterday  Potassium 3.6  No labs today      Assessment/Plan:   - Continue Urena 15 g daily  - Replace potassium p.o.  - hyponatremia slowly improving  - high protein diet.  - repeat labs tomorrow.      Hyponatremia   Presenting with sodium of 124  Received IV fluids and sodium jacob to 131  Goal correction 4-6 meq in 24 hrs  Replace potassium     Hypokalemia   Replacing as needed.       Sacral ulcer  On antibiotics.         [facility] would like to thank you for the opportunity to serve this patient. Please call with any questions or concerns.    Maggy Millard MD  [facility]  [address] [place] [address]  Fax: [phone]  Office: [phone]    [facility]  Radha Carcamo is a 79 y.o. female  with  has a past medical history of Spinal stenosis. who presented with sacral decubitus ulcer. .was found to have incidental finding of PE on chest CT. She was started on antibiotics and received IV fluids. Placed on heparin gtt. Nephrology consulted for hyponatremia with admitted sodium level of 124.      Review of Systems:   As above.     PMH/SH/FH:    Medical Hx: reviewed and updated as appropriate  Past Medical History:   Diagnosis Date    Spinal stenosis          Surgical Hx: reviewed and updated as appropriate   has a past surgical history that includes eye surgery and Rectal surgery (N/A, 5/20/2025).     Social Hx: reviewed and updated as appropriate  Social History     Tobacco Use    Smoking status: Former     Current packs/day: 1.00     Average packs/day: 1 pack/day for 10.0 years (10.0 ttl pk-yrs)     Types: Cigarettes    Smokeless

## 2025-05-26 LAB
ALBUMIN SERPL-MCNC: 2.4 G/DL (ref 3.4–5)
ANION GAP SERPL CALCULATED.3IONS-SCNC: 7 MMOL/L (ref 3–16)
BUN SERPL-MCNC: 19 MG/DL (ref 7–20)
CALCIUM SERPL-MCNC: 8.7 MG/DL (ref 8.3–10.6)
CHLORIDE SERPL-SCNC: 94 MMOL/L (ref 99–110)
CO2 SERPL-SCNC: 27 MMOL/L (ref 21–32)
CREAT SERPL-MCNC: 0.3 MG/DL (ref 0.6–1.2)
GFR SERPLBLD CREATININE-BSD FMLA CKD-EPI: >90 ML/MIN/{1.73_M2}
GLUCOSE SERPL-MCNC: 99 MG/DL (ref 70–99)
PHOSPHATE SERPL-MCNC: 3 MG/DL (ref 2.5–4.9)
POTASSIUM SERPL-SCNC: 4.2 MMOL/L (ref 3.5–5.1)
SODIUM SERPL-SCNC: 128 MMOL/L (ref 136–145)

## 2025-05-26 PROCEDURE — 6370000000 HC RX 637 (ALT 250 FOR IP): Performed by: SURGERY

## 2025-05-26 PROCEDURE — 80069 RENAL FUNCTION PANEL: CPT

## 2025-05-26 PROCEDURE — 6370000000 HC RX 637 (ALT 250 FOR IP): Performed by: NURSE PRACTITIONER

## 2025-05-26 PROCEDURE — 6370000000 HC RX 637 (ALT 250 FOR IP): Performed by: INTERNAL MEDICINE

## 2025-05-26 PROCEDURE — 6370000000 HC RX 637 (ALT 250 FOR IP)

## 2025-05-26 PROCEDURE — 6360000002 HC RX W HCPCS: Performed by: SURGERY

## 2025-05-26 PROCEDURE — 6370000000 HC RX 637 (ALT 250 FOR IP): Performed by: STUDENT IN AN ORGANIZED HEALTH CARE EDUCATION/TRAINING PROGRAM

## 2025-05-26 PROCEDURE — 94760 N-INVAS EAR/PLS OXIMETRY 1: CPT

## 2025-05-26 PROCEDURE — 2500000003 HC RX 250 WO HCPCS: Performed by: SURGERY

## 2025-05-26 PROCEDURE — 6360000002 HC RX W HCPCS: Performed by: INTERNAL MEDICINE

## 2025-05-26 PROCEDURE — 6370000000 HC RX 637 (ALT 250 FOR IP): Performed by: HOSPITALIST

## 2025-05-26 PROCEDURE — 2580000003 HC RX 258: Performed by: INTERNAL MEDICINE

## 2025-05-26 PROCEDURE — 36415 COLL VENOUS BLD VENIPUNCTURE: CPT

## 2025-05-26 PROCEDURE — 2060000000 HC ICU INTERMEDIATE R&B

## 2025-05-26 RX ORDER — SULFAMETHOXAZOLE AND TRIMETHOPRIM 800; 160 MG/1; MG/1
1 TABLET ORAL 2 TIMES DAILY
Qty: 14 TABLET | Refills: 0 | Status: SHIPPED | OUTPATIENT
Start: 2025-05-26 | End: 2025-06-02

## 2025-05-26 RX ORDER — METOPROLOL SUCCINATE 50 MG/1
50 TABLET, EXTENDED RELEASE ORAL DAILY
Qty: 30 TABLET | Refills: 3 | Status: SHIPPED | OUTPATIENT
Start: 2025-05-27

## 2025-05-26 RX ORDER — FUROSEMIDE 20 MG/1
20 TABLET ORAL DAILY
Status: DISCONTINUED | OUTPATIENT
Start: 2025-05-26 | End: 2025-05-30 | Stop reason: HOSPADM

## 2025-05-26 RX ORDER — OXYCODONE AND ACETAMINOPHEN 5; 325 MG/1; MG/1
1 TABLET ORAL EVERY 6 HOURS PRN
Qty: 12 TABLET | Refills: 0 | Status: SHIPPED | OUTPATIENT
Start: 2025-05-26 | End: 2025-05-29

## 2025-05-26 RX ORDER — AMIODARONE HYDROCHLORIDE 100 MG/1
200 TABLET ORAL DAILY
Qty: 60 TABLET | Refills: 1 | Status: SHIPPED | OUTPATIENT
Start: 2025-06-04 | End: 2025-07-13

## 2025-05-26 RX ORDER — AMIODARONE HYDROCHLORIDE 400 MG/1
400 TABLET ORAL 2 TIMES DAILY
Qty: 16 TABLET | Refills: 0 | Status: SHIPPED | OUTPATIENT
Start: 2025-05-26 | End: 2025-06-03

## 2025-05-26 RX ORDER — SODIUM CHLORIDE 1 G/1
2 TABLET ORAL
Status: DISCONTINUED | OUTPATIENT
Start: 2025-05-26 | End: 2025-05-30 | Stop reason: HOSPADM

## 2025-05-26 RX ADMIN — ACETAMINOPHEN 1000 MG: 500 TABLET ORAL at 04:51

## 2025-05-26 RX ADMIN — ACETAMINOPHEN 1000 MG: 500 TABLET ORAL at 13:02

## 2025-05-26 RX ADMIN — METOPROLOL SUCCINATE 50 MG: 50 TABLET, EXTENDED RELEASE ORAL at 08:09

## 2025-05-26 RX ADMIN — APIXABAN 5 MG: 5 TABLET, FILM COATED ORAL at 21:07

## 2025-05-26 RX ADMIN — Medication 2 G: at 17:01

## 2025-05-26 RX ADMIN — HYDROMORPHONE HYDROCHLORIDE 1 MG: 2 TABLET ORAL at 13:02

## 2025-05-26 RX ADMIN — GABAPENTIN 100 MG: 100 CAPSULE ORAL at 21:08

## 2025-05-26 RX ADMIN — ACETAMINOPHEN 1000 MG: 500 TABLET ORAL at 21:07

## 2025-05-26 RX ADMIN — CEFTAROLINE FOSAMIL 600 MG: 600 POWDER, FOR SOLUTION INTRAVENOUS at 09:07

## 2025-05-26 RX ADMIN — Medication 6 MG: at 21:08

## 2025-05-26 RX ADMIN — FUROSEMIDE 20 MG: 20 TABLET ORAL at 13:02

## 2025-05-26 RX ADMIN — HYDROMORPHONE HYDROCHLORIDE 1 MG: 2 TABLET ORAL at 04:51

## 2025-05-26 RX ADMIN — AMIODARONE HYDROCHLORIDE 400 MG: 200 TABLET ORAL at 08:09

## 2025-05-26 RX ADMIN — SODIUM CHLORIDE, PRESERVATIVE FREE 10 ML: 5 INJECTION INTRAVENOUS at 08:00

## 2025-05-26 RX ADMIN — AMIODARONE HYDROCHLORIDE 400 MG: 200 TABLET ORAL at 21:08

## 2025-05-26 RX ADMIN — METRONIDAZOLE 500 MG: 500 INJECTION, SOLUTION INTRAVENOUS at 01:14

## 2025-05-26 RX ADMIN — HYDROMORPHONE HYDROCHLORIDE 1 MG: 2 TABLET ORAL at 17:01

## 2025-05-26 RX ADMIN — HYDROMORPHONE HYDROCHLORIDE 1 MG: 2 TABLET ORAL at 09:03

## 2025-05-26 RX ADMIN — Medication 2 G: at 13:02

## 2025-05-26 RX ADMIN — APIXABAN 5 MG: 5 TABLET, FILM COATED ORAL at 08:09

## 2025-05-26 RX ADMIN — GABAPENTIN 100 MG: 100 CAPSULE ORAL at 08:09

## 2025-05-26 RX ADMIN — CEFTAROLINE FOSAMIL 600 MG: 600 POWDER, FOR SOLUTION INTRAVENOUS at 20:37

## 2025-05-26 RX ADMIN — METRONIDAZOLE 500 MG: 500 INJECTION, SOLUTION INTRAVENOUS at 17:05

## 2025-05-26 RX ADMIN — METRONIDAZOLE 500 MG: 500 INJECTION, SOLUTION INTRAVENOUS at 08:15

## 2025-05-26 RX ADMIN — HYDROMORPHONE HYDROCHLORIDE 1 MG: 2 TABLET ORAL at 21:08

## 2025-05-26 RX ADMIN — GABAPENTIN 100 MG: 100 CAPSULE ORAL at 13:03

## 2025-05-26 ASSESSMENT — PAIN SCALES - GENERAL
PAINLEVEL_OUTOF10: 10
PAINLEVEL_OUTOF10: 8
PAINLEVEL_OUTOF10: 10
PAINLEVEL_OUTOF10: 10

## 2025-05-26 ASSESSMENT — PAIN DESCRIPTION - FREQUENCY: FREQUENCY: CONTINUOUS

## 2025-05-26 ASSESSMENT — PAIN - FUNCTIONAL ASSESSMENT
PAIN_FUNCTIONAL_ASSESSMENT: PREVENTS OR INTERFERES WITH ALL ACTIVE AND SOME PASSIVE ACTIVITIES

## 2025-05-26 ASSESSMENT — PAIN DESCRIPTION - ONSET: ONSET: ON-GOING

## 2025-05-26 ASSESSMENT — PAIN DESCRIPTION - LOCATION
LOCATION: KNEE
LOCATION: BUTTOCKS
LOCATION: BUTTOCKS
LOCATION: COCCYX
LOCATION: BUTTOCKS;KNEE
LOCATION: BUTTOCKS;KNEE

## 2025-05-26 ASSESSMENT — PAIN DESCRIPTION - DESCRIPTORS
DESCRIPTORS: ACHING;DISCOMFORT

## 2025-05-26 ASSESSMENT — PAIN DESCRIPTION - PAIN TYPE: TYPE: CHRONIC PAIN

## 2025-05-26 ASSESSMENT — PAIN DESCRIPTION - ORIENTATION: ORIENTATION: RIGHT;LEFT

## 2025-05-26 NOTE — PLAN OF CARE
Problem: Discharge Planning  Goal: Discharge to home or other facility with appropriate resources  5/26/2025 0858 by Lucille Astudillo RN  Outcome: Progressing  5/26/2025 0639 by Edith Forman RN  Outcome: Progressing     Problem: Pain  Goal: Verbalizes/displays adequate comfort level or baseline comfort level  5/26/2025 0858 by Lucille Astudillo RN  Outcome: Progressing  5/26/2025 0639 by Edith Forman RN  Outcome: Progressing     Problem: Skin/Tissue Integrity  Goal: Skin integrity remains intact  Description: 1.  Monitor for areas of redness and/or skin breakdown2.  Assess vascular access sites hourly3.  Every 4-6 hours minimum:  Change oxygen saturation probe site4.  Every 4-6 hours:  If on nasal continuous positive airway pressure, respiratory therapy assess nares and determine need for appliance change or resting period  5/26/2025 0858 by Lucille Astudillo RN  Outcome: Progressing  5/26/2025 0639 by Edith Forman RN  Outcome: Progressing     Problem: Safety - Adult  Goal: Free from fall injury  5/26/2025 0858 by Lucille Astudillo RN  Outcome: Progressing  5/26/2025 0639 by Edith Forman RN  Outcome: Progressing     Problem: ABCDS Injury Assessment  Goal: Absence of physical injury  5/26/2025 0858 by Lucille Astudillo RN  Outcome: Progressing  5/26/2025 0639 by Edith Forman RN  Outcome: Progressing     Problem: Nutrition Deficit:  Goal: Optimize nutritional status  5/26/2025 0858 by Lucille Astudillo RN  Outcome: Progressing  5/26/2025 0639 by Edith Forman RN  Outcome: Progressing

## 2025-05-26 NOTE — PROGRESS NOTES
Patient ID: Radha Carcamo  Referring/ Physician: Karan Radford MD      Summary:   Radha Carcamo is being seen by nephrology for hyponatremia .     Reason for admission: sacral wound       Interval Hx:   Seen at bedside  /65  Has not responded to tolvaptan or urena  600 mL urine yesterday  On Urena 15 g daily   Sodium 128 today  Potassium 4.2      Assessment/Plan:   - Continue Urena 15 g daily  - Hyponatremia not responding to tolvaptan.  Will start salt tabs 2 g 3 times daily and p.o. Lasix 20 mg once daily      Hyponatremia   Presenting with sodium of 124  Received IV fluids and sodium jacob to 131  Goal correction 4-6 meq in 24 hrs  Replace potassium     Hypokalemia   Replacing as needed.       Sacral ulcer  On antibiotics.         [facility] would like to thank you for the opportunity to serve this patient. Please call with any questions or concerns.    Maggy Millard MD  [facility]  [address] [id number]  Fax: [phone]  Office: [phone]    HPI  Radha Carcamo is a 79 y.o. female  with  has a past medical history of Spinal stenosis. who presented with sacral decubitus ulcer. .was found to have incidental finding of PE on chest CT. She was started on antibiotics and received IV fluids. Placed on heparin gtt. Nephrology consulted for hyponatremia with admitted sodium level of 124.      Review of Systems:   As above.     PMH/SH/FH:    Medical Hx: reviewed and updated as appropriate  Past Medical History:   Diagnosis Date    Spinal stenosis          Surgical Hx: reviewed and updated as appropriate   has a past surgical history that includes eye surgery and Rectal surgery (N/A, 5/20/2025).     Social Hx: reviewed and updated as appropriate  Social History     Tobacco Use    Smoking status: Former     Current packs/day: 1.00     Average packs/day: 1 pack/day for 10.0 years (10.0 ttl pk-yrs)     Types: Cigarettes    Smokeless tobacco:

## 2025-05-26 NOTE — PROGRESS NOTES
ID Chart note    Admitted 5/19/25 - weak, unable to receive adequate care at home   Found to have sacral pressure wound and PE     OR I&D wound 5/20 down to bone   No CT evidence of sacral OM   Wound culture 5/20 - MRSA, Proteus, E coli   Surgical culture 5/20 - pan-S E coli, Bacteroides spp     5/19 5/21 5/23      Currently on ceftaroline and flagyl     -mainstay of treatment in this context is local wound care, off loading pressure, nutritional support, debridement of necrotic tissue.  No benefit from prolonged IV abx   -now s/p debridement of the wound, at discharge can change to po abx - Bactrim DS BID for 7d  PRICILLA DELAROSA MD

## 2025-05-26 NOTE — DISCHARGE INSTR - COC
Continuity of Care Form    Patient Name: Radha Carcamo   :  1945  MRN:  [id number]    Admit date:  2025  Discharge date:  2025    Code Status Order: Full Code   Advance Directives:    Date/Time Healthcare Directive Type of Healthcare Directive Copy in Chart Healthcare Agent Appointed Healthcare Agent's Name Healthcare Agent's Phone Number    [phone] No, patient does not have an advance directive for healthcare treatment  --  --  --  --  --             Admitting Physician:  Karan Radford MD  PCP: Madai Morocho APRN - NP    Discharging Nurse: ZAYRA Lamar  Discharging Hospital Unit/Room#: X7M-7185/5278-01  Discharging Unit Phone Number: [phone]    Emergency Contact:   Extended Emergency Contact Information  Primary Emergency Contact: Pradeep Carcamo           [facility]  Home Phone: [phone]  Work Phone: [phone]  Mobile Phone: [phone]  Relation: Child  Secondary Emergency Contact: None,Per Pt  Home Phone: [phone]  Relation: Other    Past Surgical History:  Past Surgical History:   Procedure Laterality Date    EYE SURGERY      cataract ou    RECTAL SURGERY N/A 2025    EXCISIONAL DEBRIDEMENT SACRAL ULCER performed by Milo Champion MD at [facility] OR       Immunization History:   Immunization History   Administered Date(s) Administered    COVID-19, MODERNA, , (age 12y+), IM, 50mcg/0.5mL 2023    COVID-19, PFIZER Bivalent, DO NOT Dilute, (age 12y+), IM, 30 mcg/0.3 mL 2022    COVID-19, PFIZER GRAY top, DO NOT Dilute, (age 12 y+), IM, 30 mcg/0.3 mL 2022    COVID-19, PFIZER PURPLE top, DILUTE for use, (age 12 y+), 30mcg/0.3mL 2021, 2021, 10/28/2021    TDaP, ADACEL (age 10y-64y), BOOSTRIX (age 10y+), IM, 0.5mL 2025       Active Problems:  Patient Active Problem List   Diagnosis Code    Sacral wound, initial encounter S31.000A    Cellulitis of sacral region L03.319    Acute cervical  myofascial strain S16.1XXA    Bilateral leg edema R60.0    Bilateral pulmonary embolism ([facility]) I26.99    Compression of lumbar vertebra ([facility]) S32.000A    Hyponatremia E87.1    Skin ulcer of sacrum ([facility]) L98.429    Pressure injury of skin of sacral region L89.159    E coli infection A49.8    Decubitus ulcer of sacral region, stage 3 ([facility]) L89.153    Atrial fibrillation ([facility]) I48.91    Atrial fibrillation with RVR ([facility]) I48.91    Infection requiring contact isolation precautions B99.9    MRSA infection (methicillin-resistant Staphylococcus aureus) A49.02    Head contusion S00.93XA    Paroxysmal atrial fibrillation ([facility]) I48.0       Isolation/Infection:   Isolation            Contact          Patient Infection Status        Infection Onset Added Last Indicated Last Indicated By Review Planned Expiration    MRSA 05/20/25 05/22/25 05/20/25 Culture, Wound (with Gram Stain)                      Nurse Assessment:  Last Vital Signs: /65   Pulse 69   Temp 98.2 °F (36.8 °C) (Oral)   Resp 17   Ht 1.6 m (5' 3\")   Wt 66.2 kg (145 lb 15.1 oz)   SpO2 93%   BMI 25.85 kg/m²     Last documented pain score (0-10 scale): Pain Level: 10  Last Weight:   Wt Readings from Last 1 Encounters:   05/26/25 66.2 kg (145 lb 15.1 oz)     Mental Status:  oriented, alert, coherent, logical, and thought processes intact    IV Access:  - None    Nursing Mobility/ADLs:  Walking   Dependent  Transfer  Dependent  Bathing  Dependent  Dressing  Dependent  Toileting  Dependent  Feeding  Dependent  Med Admin  Dependent  Med Delivery   whole    Wound Care Documentation and Therapy:  Negative Pressure Wound Therapy Sacrum (Active)   Dressing Status Intact w/seal 05/26/25 0854   Canister changed? No 05/26/25 0854   Output (ml) 50 ml 05/22/25 0739   Unit Type [id number] 05/23/25 1653   Mode Continuous 05/26/25 0854   Target Pressure (mmHg) 125 05/26/25 0854   Intensity Medium 05/23/25 1653   $$ Dressing Changed & Charged $ Standard NPWT less than or

## 2025-05-26 NOTE — PROGRESS NOTES
contusion.  No pleural effusion or pneumothorax. Bones: No suspicious osseous findings. ABDOMEN/PELVIS: Organs: The liver is unremarkable.  Mildly prominent intrahepatic and extrahepatic bile ducts, likely representing reservoir effect in the setting of prior cholecystectomy.  The spleen, pancreas, and adrenal glands are unremarkable.  No suspicious renal lesion.  No hydronephrosis or renal calculi. GI/Bowel: Limited evaluation of the bowel due to significant motion artifact. No discrete bowel injury is identified. Pelvis: Limited evaluation of the pelvis. The bladder is decompressed with Keenan catheter in place. Peritoneum/Retroperitoneum: No retroperitoneal, mesenteric, or pelvic lymphadenopathy.  No free fluid or pneumoperitoneum.  Moderate atherosclerosis of the abdominal aorta and iliac arteries without aneurysm. Bones/Soft Tissues: Diffuse osseous demineralization.  There is a sacral decubitus ulcer without visualized underlying osteomyelitis. Moderate body wall edema. Left total hip arthroplasty. THORACOLUMBAR SPINE: BONES/ALIGNMENT: Mild superior endplate deformities of the T4, L2, and L5 favored nonacute.  Mild levoconvex curvature of the lumbar spine. DEGENERATIVE CHANGES: Degenerative changes are seen throughout the thoracic and lumbar spine.  There is moderate to severe degenerative disc disease from the L1-S1 levels.  Extensive bilateral facet arthropathy.  There is at least moderate spinal canal narrowing at the L3-L4 level and at the L5-S1 level. SOFT TISSUES: No paraspinal mass is seen.     1. Pulmonary emboli are seen in the right lower lobar pulmonary artery extending to segmental branch and in a left upper lobe segmental branch pulmonary artery. No convincing evidence of right heart strain. 2. Prominent intrahepatic and extrahepatic bile ducts, likely representing reservoir effect in the setting of prior cholecystectomy.  Consider correlation with LFTs. 3. Mild superior endplate deformities of the  without visualized underlying osteomyelitis. 5. Moderate body wall edema. 6. Note limited evaluation of the abdomen and pelvis due to motion artifact despite repeat scan. Discussed with Dr. Yeison Ledbetter by Dr. Prado at 2:31 PM on 5/19/2025     CT CERVICAL SPINE WO CONTRAST  Result Date: 5/19/2025  EXAMINATION: CT OF THE CERVICAL SPINE WITHOUT CONTRAST 5/19/2025 12:25 pm TECHNIQUE: CT of the cervical spine was performed without the administration of intravenous contrast. Multiplanar reformatted images are provided for review. Automated exposure control, iterative reconstruction, and/or weight based adjustment of the mA/kV was utilized to reduce the radiation dose to as low as reasonably achievable. COMPARISON: None. HISTORY: ORDERING SYSTEM PROVIDED HISTORY: fall neck pain TECHNOLOGIST PROVIDED HISTORY: Reason for exam:->fall neck pain Decision Support Exception - unselect if not a suspected or confirmed emergency medical condition->Emergency Medical Condition (MA) Reason for Exam: fall, neck and back pain, large sacral decubitis ulcer cellulitis FINDINGS: BONES/ALIGNMENT: There is no acute fracture or traumatic malalignment. DEGENERATIVE CHANGES: No severe osseous spinal canal stenosis.  Multilevel degenerative changes. SOFT TISSUES: There is no prevertebral soft tissue swelling.     No acute abnormality of the cervical spine.     CT HEAD WO CONTRAST  Result Date: 5/19/2025  EXAM: CT HEAD WITHOUT 05/19/2025 01:06:39 PM TECHNIQUE: CT of the head was performed without the administration of intravenous contrast. Automated exposure control, iterative reconstruction, and/or weight based adjustment of the mA/kV was utilized to reduce the radiation dose to as low as reasonably achievable. COMPARISON: None available. CLINICAL HISTORY: Fall head injury. FINDINGS: BRAIN AND VENTRICLES: There is no acute intracranial hemorrhage, mass effect or midline shift. No abnormal extra-axial fluid collection. The gray-white

## 2025-05-26 NOTE — CARE COORDINATION
Referrals previously sent to:    [facility]    Awaiting an accepting facility. Will need precert, HENS, and transport once a facility accepts.    Electronically signed by Brittani Resendiz RN MSN on 5/26/2025 at 11:56 AM

## 2025-05-26 NOTE — PROGRESS NOTES
Pt rep on call light- making requests for things such as changing her tv channel or the volume. Pt educated that if she can press the button for staff she can operate the rest of the buttons on her remote. Rep asking for pain meds even after immediately receiving them.   Order in place to change wound vac dressing today- pt has discharge order noted. Awaiting update on d/c and precert before changing dressing.   Electronically signed by Lucille Astudillo RN on 5/26/25 at 10:19 AM EDT    RN holding wound vac dressing change this shift as pt will likely d/c tomorrow and dressing will be removed and wet to dry placed. Discussed with Dr. Radford-- MD in agreement.   Electronically signed by Lucille Astudillo RN on 5/26/25 at 12:57 PM EDT    Pt remained in bed this shift allowing turns to L side.   Wound vac in place- draining well.   Keenan in place- draining well.  Continues on IV atb.  Discharge order in place- pending placement and precert.   Electronically signed by Lucille Astudillo RN on 5/26/25 at 5:48 PM EDT

## 2025-05-27 PROBLEM — A49.8 BACTEROIDES INFECTION: Status: ACTIVE | Noted: 2025-05-27

## 2025-05-27 PROBLEM — A49.02 MRSA (METHICILLIN RESISTANT STAPHYLOCOCCUS AUREUS) INFECTION: Status: ACTIVE | Noted: 2025-05-27

## 2025-05-27 LAB
ALBUMIN SERPL-MCNC: 2.5 G/DL (ref 3.4–5)
ANION GAP SERPL CALCULATED.3IONS-SCNC: 8 MMOL/L (ref 3–16)
BUN SERPL-MCNC: 20 MG/DL (ref 7–20)
CALCIUM SERPL-MCNC: 8.8 MG/DL (ref 8.3–10.6)
CHLORIDE SERPL-SCNC: 95 MMOL/L (ref 99–110)
CO2 SERPL-SCNC: 27 MMOL/L (ref 21–32)
CREAT SERPL-MCNC: 0.4 MG/DL (ref 0.6–1.2)
DEPRECATED RDW RBC AUTO: 13.1 % (ref 12.4–15.4)
GFR SERPLBLD CREATININE-BSD FMLA CKD-EPI: >90 ML/MIN/{1.73_M2}
GLUCOSE SERPL-MCNC: 102 MG/DL (ref 70–99)
HCT VFR BLD AUTO: 27.7 % (ref 36–48)
HGB BLD-MCNC: 9.6 G/DL (ref 12–16)
MCH RBC QN AUTO: 32.9 PG (ref 26–34)
MCHC RBC AUTO-ENTMCNC: 34.5 G/DL (ref 31–36)
MCV RBC AUTO: 95.2 FL (ref 80–100)
PHOSPHATE SERPL-MCNC: 3.5 MG/DL (ref 2.5–4.9)
PLATELET # BLD AUTO: 498 K/UL (ref 135–450)
PMV BLD AUTO: 7.2 FL (ref 5–10.5)
POTASSIUM SERPL-SCNC: 4.5 MMOL/L (ref 3.5–5.1)
RBC # BLD AUTO: 2.91 M/UL (ref 4–5.2)
SODIUM SERPL-SCNC: 130 MMOL/L (ref 136–145)
WBC # BLD AUTO: 7.6 K/UL (ref 4–11)

## 2025-05-27 PROCEDURE — 80069 RENAL FUNCTION PANEL: CPT

## 2025-05-27 PROCEDURE — 6360000002 HC RX W HCPCS: Performed by: INTERNAL MEDICINE

## 2025-05-27 PROCEDURE — 6370000000 HC RX 637 (ALT 250 FOR IP): Performed by: SURGERY

## 2025-05-27 PROCEDURE — 51702 INSERT TEMP BLADDER CATH: CPT

## 2025-05-27 PROCEDURE — 6370000000 HC RX 637 (ALT 250 FOR IP): Performed by: INTERNAL MEDICINE

## 2025-05-27 PROCEDURE — 6360000002 HC RX W HCPCS: Performed by: SURGERY

## 2025-05-27 PROCEDURE — 97605 NEG PRS WND THER DME<=50SQCM: CPT

## 2025-05-27 PROCEDURE — 2580000003 HC RX 258: Performed by: INTERNAL MEDICINE

## 2025-05-27 PROCEDURE — 6370000000 HC RX 637 (ALT 250 FOR IP)

## 2025-05-27 PROCEDURE — 6360000002 HC RX W HCPCS: Performed by: HOSPITALIST

## 2025-05-27 PROCEDURE — 2500000003 HC RX 250 WO HCPCS: Performed by: SURGERY

## 2025-05-27 PROCEDURE — 6370000000 HC RX 637 (ALT 250 FOR IP): Performed by: HOSPITALIST

## 2025-05-27 PROCEDURE — 94760 N-INVAS EAR/PLS OXIMETRY 1: CPT

## 2025-05-27 PROCEDURE — 36415 COLL VENOUS BLD VENIPUNCTURE: CPT

## 2025-05-27 PROCEDURE — 99233 SBSQ HOSP IP/OBS HIGH 50: CPT | Performed by: INTERNAL MEDICINE

## 2025-05-27 PROCEDURE — 6370000000 HC RX 637 (ALT 250 FOR IP): Performed by: STUDENT IN AN ORGANIZED HEALTH CARE EDUCATION/TRAINING PROGRAM

## 2025-05-27 PROCEDURE — 6370000000 HC RX 637 (ALT 250 FOR IP): Performed by: NURSE PRACTITIONER

## 2025-05-27 PROCEDURE — 85027 COMPLETE CBC AUTOMATED: CPT

## 2025-05-27 PROCEDURE — 2500000003 HC RX 250 WO HCPCS: Performed by: INTERNAL MEDICINE

## 2025-05-27 PROCEDURE — G0545 PR INHERENT VISIT TO INPT: HCPCS | Performed by: INTERNAL MEDICINE

## 2025-05-27 PROCEDURE — 2060000000 HC ICU INTERMEDIATE R&B

## 2025-05-27 RX ORDER — SODIUM CHLORIDE 0.9 % (FLUSH) 0.9 %
5-40 SYRINGE (ML) INJECTION EVERY 12 HOURS SCHEDULED
Status: DISCONTINUED | OUTPATIENT
Start: 2025-05-27 | End: 2025-05-30 | Stop reason: HOSPADM

## 2025-05-27 RX ORDER — MORPHINE SULFATE 2 MG/ML
2 INJECTION, SOLUTION INTRAMUSCULAR; INTRAVENOUS ONCE
Status: DISCONTINUED | OUTPATIENT
Start: 2025-05-27 | End: 2025-05-27

## 2025-05-27 RX ORDER — SULFAMETHOXAZOLE AND TRIMETHOPRIM 800; 160 MG/1; MG/1
1 TABLET ORAL EVERY 12 HOURS SCHEDULED
Status: DISCONTINUED | OUTPATIENT
Start: 2025-05-27 | End: 2025-05-27

## 2025-05-27 RX ORDER — LIDOCAINE HYDROCHLORIDE 10 MG/ML
50 INJECTION, SOLUTION EPIDURAL; INFILTRATION; INTRACAUDAL; PERINEURAL ONCE
Status: DISCONTINUED | OUTPATIENT
Start: 2025-05-27 | End: 2025-05-30 | Stop reason: HOSPADM

## 2025-05-27 RX ORDER — LINEZOLID 600 MG/1
600 TABLET, FILM COATED ORAL EVERY 12 HOURS SCHEDULED
Status: DISCONTINUED | OUTPATIENT
Start: 2025-05-27 | End: 2025-05-30 | Stop reason: HOSPADM

## 2025-05-27 RX ORDER — SODIUM CHLORIDE 0.9 % (FLUSH) 0.9 %
5-40 SYRINGE (ML) INJECTION PRN
Status: DISCONTINUED | OUTPATIENT
Start: 2025-05-27 | End: 2025-05-30 | Stop reason: HOSPADM

## 2025-05-27 RX ORDER — MORPHINE SULFATE 2 MG/ML
2 INJECTION, SOLUTION INTRAMUSCULAR; INTRAVENOUS ONCE
Status: COMPLETED | OUTPATIENT
Start: 2025-05-27 | End: 2025-05-27

## 2025-05-27 RX ORDER — SODIUM CHLORIDE 9 MG/ML
INJECTION, SOLUTION INTRAVENOUS PRN
Status: DISCONTINUED | OUTPATIENT
Start: 2025-05-27 | End: 2025-05-30 | Stop reason: HOSPADM

## 2025-05-27 RX ADMIN — Medication 2 G: at 12:10

## 2025-05-27 RX ADMIN — METRONIDAZOLE 500 MG: 500 INJECTION, SOLUTION INTRAVENOUS at 00:33

## 2025-05-27 RX ADMIN — LINEZOLID 600 MG: 600 TABLET, FILM COATED ORAL at 12:36

## 2025-05-27 RX ADMIN — ACETAMINOPHEN 1000 MG: 500 TABLET ORAL at 21:05

## 2025-05-27 RX ADMIN — METRONIDAZOLE 500 MG: 500 INJECTION, SOLUTION INTRAVENOUS at 09:00

## 2025-05-27 RX ADMIN — GABAPENTIN 100 MG: 100 CAPSULE ORAL at 08:52

## 2025-05-27 RX ADMIN — HYDROMORPHONE HYDROCHLORIDE 1 MG: 2 TABLET ORAL at 08:52

## 2025-05-27 RX ADMIN — MORPHINE SULFATE 2 MG: 2 INJECTION, SOLUTION INTRAMUSCULAR; INTRAVENOUS at 14:25

## 2025-05-27 RX ADMIN — SODIUM CHLORIDE, PRESERVATIVE FREE 5 ML: 5 INJECTION INTRAVENOUS at 09:27

## 2025-05-27 RX ADMIN — HYDROMORPHONE HYDROCHLORIDE 1 MG: 2 TABLET ORAL at 12:11

## 2025-05-27 RX ADMIN — AMIODARONE HYDROCHLORIDE 400 MG: 200 TABLET ORAL at 21:04

## 2025-05-27 RX ADMIN — FUROSEMIDE 20 MG: 20 TABLET ORAL at 08:51

## 2025-05-27 RX ADMIN — AMIODARONE HYDROCHLORIDE 400 MG: 200 TABLET ORAL at 08:50

## 2025-05-27 RX ADMIN — SULFAMETHOXAZOLE AND TRIMETHOPRIM 1 TABLET: 800; 160 TABLET ORAL at 11:04

## 2025-05-27 RX ADMIN — HYDROMORPHONE HYDROCHLORIDE 1 MG: 2 TABLET ORAL at 01:29

## 2025-05-27 RX ADMIN — ACETAMINOPHEN 1000 MG: 500 TABLET ORAL at 05:38

## 2025-05-27 RX ADMIN — Medication 2 G: at 08:51

## 2025-05-27 RX ADMIN — POLYETHYLENE GLYCOL 3350 17 G: 17 POWDER, FOR SOLUTION ORAL at 21:10

## 2025-05-27 RX ADMIN — APIXABAN 5 MG: 5 TABLET, FILM COATED ORAL at 21:04

## 2025-05-27 RX ADMIN — PIPERACILLIN AND TAZOBACTAM 4500 MG: 4; .5 INJECTION, POWDER, LYOPHILIZED, FOR SOLUTION INTRAVENOUS at 12:13

## 2025-05-27 RX ADMIN — GABAPENTIN 100 MG: 100 CAPSULE ORAL at 21:04

## 2025-05-27 RX ADMIN — ONDANSETRON 4 MG: 2 INJECTION, SOLUTION INTRAMUSCULAR; INTRAVENOUS at 13:57

## 2025-05-27 RX ADMIN — SODIUM CHLORIDE, PRESERVATIVE FREE 10 ML: 5 INJECTION INTRAVENOUS at 21:06

## 2025-05-27 RX ADMIN — Medication 2 G: at 17:05

## 2025-05-27 RX ADMIN — HYDROMORPHONE HYDROCHLORIDE 1 MG: 2 TABLET ORAL at 23:31

## 2025-05-27 RX ADMIN — DICLOFENAC SODIUM 4 G: 10 GEL TOPICAL at 21:07

## 2025-05-27 RX ADMIN — METOPROLOL SUCCINATE 50 MG: 50 TABLET, EXTENDED RELEASE ORAL at 08:52

## 2025-05-27 RX ADMIN — HYDROMORPHONE HYDROCHLORIDE 1 MG: 2 TABLET ORAL at 05:38

## 2025-05-27 RX ADMIN — Medication 6 MG: at 21:04

## 2025-05-27 RX ADMIN — APIXABAN 5 MG: 5 TABLET, FILM COATED ORAL at 08:51

## 2025-05-27 RX ADMIN — PIPERACILLIN AND TAZOBACTAM 3375 MG: 3; .375 INJECTION, POWDER, LYOPHILIZED, FOR SOLUTION INTRAVENOUS at 19:03

## 2025-05-27 ASSESSMENT — PAIN DESCRIPTION - FREQUENCY
FREQUENCY: CONTINUOUS
FREQUENCY: CONTINUOUS

## 2025-05-27 ASSESSMENT — PAIN DESCRIPTION - ORIENTATION
ORIENTATION: RIGHT;LEFT

## 2025-05-27 ASSESSMENT — PAIN DESCRIPTION - LOCATION
LOCATION: BUTTOCKS
LOCATION: COCCYX
LOCATION: KNEE;LEG
LOCATION: LEG;KNEE
LOCATION: KNEE
LOCATION: COCCYX
LOCATION: LEG;KNEE
LOCATION: KNEE;LEG

## 2025-05-27 ASSESSMENT — PAIN SCALES - GENERAL
PAINLEVEL_OUTOF10: 10
PAINLEVEL_OUTOF10: 2
PAINLEVEL_OUTOF10: 9
PAINLEVEL_OUTOF10: 10
PAINLEVEL_OUTOF10: 10
PAINLEVEL_OUTOF10: 0
PAINLEVEL_OUTOF10: 9
PAINLEVEL_OUTOF10: 7
PAINLEVEL_OUTOF10: 0
PAINLEVEL_OUTOF10: 7
PAINLEVEL_OUTOF10: 10

## 2025-05-27 ASSESSMENT — PAIN - FUNCTIONAL ASSESSMENT
PAIN_FUNCTIONAL_ASSESSMENT: PREVENTS OR INTERFERES SOME ACTIVE ACTIVITIES AND ADLS

## 2025-05-27 ASSESSMENT — PAIN DESCRIPTION - DESCRIPTORS
DESCRIPTORS: BURNING
DESCRIPTORS: ACHING;SORE
DESCRIPTORS: ACHING;PRESSURE;SORE

## 2025-05-27 ASSESSMENT — PAIN DESCRIPTION - PAIN TYPE
TYPE: CHRONIC PAIN
TYPE: CHRONIC PAIN

## 2025-05-27 ASSESSMENT — PAIN DESCRIPTION - ONSET
ONSET: ON-GOING
ONSET: ON-GOING

## 2025-05-27 NOTE — PROGRESS NOTES
Infusions:   sodium chloride Stopped (05/22/25 1047)       PRN Meds:  HYDROmorphone, diclofenac sodium, melatonin, sulfur hexafluoride microspheres, prochlorperazine, sodium chloride flush, sodium chloride, potassium chloride **OR** potassium alternative oral replacement **OR** potassium chloride, magnesium sulfate, ondansetron **OR** ondansetron, polyethylene glycol    Imaging:   XR ABDOMEN (KUB) (SINGLE AP VIEW)   Final Result   Nonspecific bowel gas pattern.         Vascular duplex lower extremity venous bilateral   Final Result      CT HEAD WO CONTRAST   Final Result   1. No acute intracranial abnormality.   2. Mild periventricular hypoattenuation, consistent with mild chronic small vessel ischemic changes.            CT CERVICAL SPINE WO CONTRAST   Final Result   No acute abnormality of the cervical spine.         CT CHEST ABDOMEN PELVIS W CONTRAST Additional Contrast? None   Final Result   1. Pulmonary emboli are seen in the right lower lobar pulmonary artery   extending to segmental branch and in a left upper lobe segmental branch   pulmonary artery. No convincing evidence of right heart strain.   2. Prominent intrahepatic and extrahepatic bile ducts, likely representing   reservoir effect in the setting of prior cholecystectomy.  Consider   correlation with LFTs.   3. Mild superior endplate deformities of the T4, L2, and L5 vertebral bodies   are favored nonacute. Correlate with clinical symptoms.   4. Sacral decubitus ulcer without visualized underlying osteomyelitis.   5. Moderate body wall edema.   6. Note limited evaluation of the abdomen and pelvis due to motion artifact   despite repeat scan.   Discussed with Dr. Yeison Ledbetter by Dr. Prado at 2:31 PM on 5/19/2025         CT THORACIC SPINE BONY RECONSTRUCTION   Final Result   1. Pulmonary emboli are seen in the right lower lobar pulmonary artery   extending to segmental branch and in a left upper lobe segmental branch   pulmonary artery. No convincing  Pressure injury of skin of sacral region L89.159    E coli infection A49.8    Decubitus ulcer of sacral region, stage 3 ([facility]) L89.153    Atrial fibrillation ([facility]) I48.91    Atrial fibrillation with RVR ([facility]) I48.91    Infection requiring contact isolation precautions B99.9    MRSA infection (methicillin-resistant Staphylococcus aureus) A49.02    Head contusion S00.93XA    Paroxysmal atrial fibrillation ([facility]) I48.0        ICD-10-CM    1. Cellulitis of sacral region  L03.319 oxyCODONE-acetaminophen (PERCOCET) 5-325 MG per tablet      2. Pressure injury of skin of sacral region, unspecified injury stage  L89.159       3. Bilateral leg edema  R60.0       4. Contusion of head, unspecified part of head, initial encounter  S00.93XA       5. Acute cervical myofascial strain, initial encounter  S16.1XXA       6. Hyponatremia  E87.1       7. Atrial fibrillation, unspecified type ([facility])  I48.91       8. Bilateral pulmonary embolism ([facility])  I26.99       9. Compression fracture of lumbar vertebra, unspecified lumbar vertebral level, initial encounter ([facility])  S32.000A       10. Pulmonary embolism without acute cor pulmonale, unspecified chronicity, unspecified pulmonary embolism type ([facility])  I26.99 Vascular duplex lower extremity venous bilateral     Vascular duplex lower extremity venous bilateral      11. Skin ulcer of sacrum, unspecified ulcer stage ([facility])  L98.429 Culture, Surgical     Culture, Surgical      12. Paroxysmal atrial fibrillation ([facility])  I48.0 Echo (TTE) complete (PRN contrast/bubble/strain/3D)     Echo (TTE) complete (PRN contrast/bubble/strain/3D)     Cardioversion external     Cardioversion external      13. Atrial fibrillation with RVR ([facility])  I48.91 Cardioversion external     Cardioversion external         Admitted with generalized weakness  Difficulty with ambulation  Lower extremity swelling  Sacral decubitus ulcer with ongoing cellulitis and drainage  Hyponatremia  Lumbar spinal stenosis  Pulmonary embolism

## 2025-05-27 NOTE — CARE COORDINATION
Discharge Planning:  [facility] submitted   Auth request #  [id number]   For [facility] [place].    Called [phone]  Jan to advise of one time contract.

## 2025-05-27 NOTE — CARE COORDINATION
Discharge Planning:  SW spoke with patient and advised that  precert is being sought for [facility], patient reports that she wanted to go to [facility], advised that [facility] had no beds currently and [facility] also didn't have bed.    Submitted precert for [facility] reports they are willing to do a one time contract with [facility] to provide Care.    SW checked [facility] portal at 6pm- precert still pending, added additional clinicals( wound care and Nephrology notes.

## 2025-05-27 NOTE — PLAN OF CARE
Problem: Discharge Planning  Goal: Discharge to home or other facility with appropriate resources  5/27/2025 0825 by Mercy Llamas RN  Outcome: Progressing  5/27/2025 0021 by Edith Forman RN  Outcome: Progressing     Problem: Pain  Goal: Verbalizes/displays adequate comfort level or baseline comfort level  5/27/2025 0825 by Mercy Llamas RN  Outcome: Progressing  5/27/2025 0021 by Edith Forman RN  Outcome: Progressing     Problem: Skin/Tissue Integrity  Goal: Skin integrity remains intact  Description: 1.  Monitor for areas of redness and/or skin breakdown2.  Assess vascular access sites hourly3.  Every 4-6 hours minimum:  Change oxygen saturation probe site4.  Every 4-6 hours:  If on nasal continuous positive airway pressure, respiratory therapy assess nares and determine need for appliance change or resting period  5/27/2025 0825 by Mercy Llamas RN  Outcome: Progressing  5/27/2025 0021 by Edith Forman RN  Outcome: Progressing     Problem: Safety - Adult  Goal: Free from fall injury  5/27/2025 0825 by Mercy Llamas RN  Outcome: Progressing  5/27/2025 0021 by Edith Forman RN  Outcome: Progressing     Problem: ABCDS Injury Assessment  Goal: Absence of physical injury  5/27/2025 0825 by Mercy Llamas RN  Outcome: Progressing  5/27/2025 0021 by Edith Forman RN  Outcome: Progressing     Problem: Nutrition Deficit:  Goal: Optimize nutritional status  5/27/2025 0825 by Mrecy Llamas RN  Outcome: Progressing  5/27/2025 0021 by Edith Forman RN  Outcome: Progressing

## 2025-05-27 NOTE — PROGRESS NOTES
administration of intravenous contrast. Multiplanar reformatted images are provided for review. Automated exposure control, iterative reconstruction, and/or weight based adjustment of the mA/kV was utilized to reduce the radiation dose to as low as reasonably achievable.; CT of the lumbar spine was performed without the administration of intravenous contrast. Multiplanar reformatted images are provided for review.  Adjustment of mA and/or kV according to patient size was utilized.  Automated exposure control, iterative reconstruction, and/or weight based adjustment of the mA/kV was utilized to reduce the radiation dose to as low as reasonably achievable. COMPARISON: None HISTORY: ORDERING SYSTEM PROVIDED HISTORY: fall flank pain, large sacral decuitus ulcer cellulitis TECHNOLOGIST PROVIDED HISTORY: Reason for exam:->fall flank pain, large sacral decuitus ulcer cellulitis Additional Contrast?->None Decision Support Exception - unselect if not a suspected or confirmed emergency medical condition->Emergency Medical Condition (MA) Reason for Exam: fall, neck and back pain, large sacral decubitis ulcer cellulitis; ORDERING SYSTEM PROVIDED HISTORY: fall back pain TECHNOLOGIST PROVIDED HISTORY: Reason for exam:->fall back pain Reason for Exam: fall, neck and back pain, large sacral decubitis ulcer cellulitis FINDINGS: CHEST: Chest Wall and Thoracic Inlet: No axillary or supraclavicular lymphadenopathy.  The thyroid gland is unremarkable. Mediastinum and Margie: No mediastinal or hilar lymphadenopathy. Normal caliber of the thoracic aorta.  Pulmonary emboli are seen in the right lower lobar pulmonary artery extending to segmental branch and in a left upper lobe segmental branch pulmonary artery.  No convincing evidence of right heart strain.  No pericardial effusion. Lungs/Pleura: Mild hypoventilatory changes of the lungs.  No focal airspace disease or pulmonary contusion.  No pleural effusion or pneumothorax. Bones: No  Correlate with clinical symptoms. 4. Sacral decubitus ulcer without visualized underlying osteomyelitis. 5. Moderate body wall edema. 6. Note limited evaluation of the abdomen and pelvis due to motion artifact despite repeat scan. Discussed with Dr. Yeison Ledbetter by Dr. Prado at 2:31 PM on 5/19/2025     CT CERVICAL SPINE WO CONTRAST  Result Date: 5/19/2025  EXAMINATION: CT OF THE CERVICAL SPINE WITHOUT CONTRAST 5/19/2025 12:25 pm TECHNIQUE: CT of the cervical spine was performed without the administration of intravenous contrast. Multiplanar reformatted images are provided for review. Automated exposure control, iterative reconstruction, and/or weight based adjustment of the mA/kV was utilized to reduce the radiation dose to as low as reasonably achievable. COMPARISON: None. HISTORY: ORDERING SYSTEM PROVIDED HISTORY: fall neck pain TECHNOLOGIST PROVIDED HISTORY: Reason for exam:->fall neck pain Decision Support Exception - unselect if not a suspected or confirmed emergency medical condition->Emergency Medical Condition (MA) Reason for Exam: fall, neck and back pain, large sacral decubitis ulcer cellulitis FINDINGS: BONES/ALIGNMENT: There is no acute fracture or traumatic malalignment. DEGENERATIVE CHANGES: No severe osseous spinal canal stenosis.  Multilevel degenerative changes. SOFT TISSUES: There is no prevertebral soft tissue swelling.     No acute abnormality of the cervical spine.     CT HEAD WO CONTRAST  Result Date: 5/19/2025  EXAM: CT HEAD WITHOUT 05/19/2025 01:06:39 PM TECHNIQUE: CT of the head was performed without the administration of intravenous contrast. Automated exposure control, iterative reconstruction, and/or weight based adjustment of the mA/kV was utilized to reduce the radiation dose to as low as reasonably achievable. COMPARISON: None available. CLINICAL HISTORY: Fall head injury. FINDINGS: BRAIN AND VENTRICLES: There is no acute intracranial hemorrhage, mass effect or midline shift. No

## 2025-05-27 NOTE — PROGRESS NOTES
Clinical Pharmacy Note  Dose Adjustment    Wt Readings from Last 1 Encounters:   05/27/25 66.2 kg (145 lb 15.1 oz)      BMI Readings from Last 1 Encounters:   05/27/25 25.85 kg/m²      Estimated Creatinine Clearance: 106 mL/min (A) (based on SCr of 0.4 mg/dL (L)).    Radha Carcamo is receiving Zosyn. The dose has been adjusted to 4500mg x 1 dose then 3375mg q8h extended infusion per protocol.    Pharmacy will continue to monitor and adjust dose as needed for changes in renal function.    Jl Stephen [facility], 5/27/2025 11:37 AM

## 2025-05-27 NOTE — CARE COORDINATION
Discharge Planning:  Called Ivonne Mejía- she will review patient and call SW back.  Called [facility]- they have no beds.  Called [facility] and left message for [facility].

## 2025-05-27 NOTE — PROGRESS NOTES
Patient ID: Radha Carcamo  Referring/ Physician: Karan Radford MD      Summary:   Radha Carcamo is being seen by nephrology for hyponatremia .     Reason for admission: sacral wound       Interval Hx:   Seen at bedside  No complaints.   No edema       NA up to 130.   /62        Assessment/Plan:   - Continue Urena 15 g daily  - cont salt tabs 2 g 3 times daily and p.o. Lasix 20 mg once daily      Hyponatremia   Presenting with sodium of 124  Received IV fluids and sodium jacob to 131  Goal correction 4-6 meq in 24 hrs  Replace potassium     Hypokalemia   Replacing as needed.       Sacral ulcer  On antibiotics.         [facility] would like to thank you for the opportunity to serve this patient. Please call with any questions or concerns.    Hawa Curiel MD  [facility]  [address] [phone]  Fax: [phone]  Office: [phone]    [facility]  Radha Carcamo is a 79 y.o. female  with  has a past medical history of Spinal stenosis. who presented with sacral decubitus ulcer. .was found to have incidental finding of PE on chest CT. She was started on antibiotics and received IV fluids. Placed on heparin gtt. Nephrology consulted for hyponatremia with admitted sodium level of 124.      Review of Systems:   As above.     PMH/SH/FH:    Medical Hx: reviewed and updated as appropriate  Past Medical History:   Diagnosis Date    Spinal stenosis          Surgical Hx: reviewed and updated as appropriate   has a past surgical history that includes eye surgery and Rectal surgery (N/A, 5/20/2025).     Social Hx: reviewed and updated as appropriate  Social History     Tobacco Use    Smoking status: Former     Current packs/day: 1.00     Average packs/day: 1 pack/day for 10.0 years (10.0 ttl pk-yrs)     Types: Cigarettes    Smokeless tobacco: Never   Substance Use Topics    Alcohol use: Yes     Comment: occ        Family hx: reviewed and updated as

## 2025-05-27 NOTE — CARE COORDINATION
Wound Follow-up Progress Note       NAME:  Radha Carcamo  MEDICAL RECORD NUMBER:  [id number]  AGE:  79 y.o.   GENDER:  female  :  1945  TODAY'S DATE:  2025    Subjective  Vac dressing change. Awaiting d/c placement.     Objective        Patient Goal of Care:  Wound Healing     /72   Pulse 79   Temp 98.2 °F (36.8 °C) (Oral)   Resp 17   Ht 1.6 m (5' 3\")   Wt 66.2 kg (145 lb 15.1 oz)   SpO2 95%   BMI 25.85 kg/m²   Dao Risk Score: Dao Scale Score: 12    Assessment    Measurements:  Negative Pressure Wound Therapy Sacrum (Active)   Dressing Status Intact w/seal 25 1455   Canister changed? No 25 1455   Output (ml) 225 ml 25 1332   Unit Type [id number] 25 1455   Mode Continuous 25 1455   Target Pressure (mmHg) 125 25 1455   Intensity Medium 25 1455   $$ Dressing Changed & Charged $ Standard NPWT less than or equal to 50 sq cm PER TX 25 1455   Number of pieces placed 1 25 1455   Dressing Type Black foam 25 1455   Dressing Change Due 25 1455   Number of days: 6       Wound 25 Sacrum (Active)   Wound Image   25 1447   Wound Etiology Surgical 25 1455   Dressing Status New dressing applied 25 1455   Wound Cleansed Cleansed with saline 25 1455   Dressing/Treatment Negative pressure wound therapy 25 1455   Dressing Change Due 25 1455   Wound Length (cm) 8 cm 25 1246   Wound Width (cm) 4 cm 25 1246   Wound Depth (cm) 2.5 cm 25 1246   Wound Surface Area (cm^2) 32 cm^2 25 1246   Wound Volume (cm^3) 80 cm^3 25 1246   Undermining Starts ___ O'Clock 12 25 1246   Undermining Ends___ O'Clock 12 25 1246   Undermining Maxium Distance (cm) 2 25 1246   Wound Assessment Subcutaneous;Pink/red;Devitalized tissue 25 1456   Drainage Amount Small (< 25%) 25 1454   Drainage Description

## 2025-05-28 LAB
ALBUMIN SERPL-MCNC: 2.6 G/DL (ref 3.4–5)
ANION GAP SERPL CALCULATED.3IONS-SCNC: 8 MMOL/L (ref 3–16)
BUN SERPL-MCNC: 20 MG/DL (ref 7–20)
CALCIUM SERPL-MCNC: 8.8 MG/DL (ref 8.3–10.6)
CHLORIDE SERPL-SCNC: 95 MMOL/L (ref 99–110)
CO2 SERPL-SCNC: 26 MMOL/L (ref 21–32)
CREAT SERPL-MCNC: 0.4 MG/DL (ref 0.6–1.2)
CRP SERPL-MCNC: 5.6 MG/L (ref 0–5.1)
ERYTHROCYTE [SEDIMENTATION RATE] IN BLOOD BY WESTERGREN METHOD: 39 MM/HR (ref 0–30)
GFR SERPLBLD CREATININE-BSD FMLA CKD-EPI: >90 ML/MIN/{1.73_M2}
GLUCOSE SERPL-MCNC: 93 MG/DL (ref 70–99)
PHOSPHATE SERPL-MCNC: 3.7 MG/DL (ref 2.5–4.9)
POTASSIUM SERPL-SCNC: 4.5 MMOL/L (ref 3.5–5.1)
SODIUM SERPL-SCNC: 129 MMOL/L (ref 136–145)

## 2025-05-28 PROCEDURE — 6370000000 HC RX 637 (ALT 250 FOR IP): Performed by: SURGERY

## 2025-05-28 PROCEDURE — 36415 COLL VENOUS BLD VENIPUNCTURE: CPT

## 2025-05-28 PROCEDURE — 76937 US GUIDE VASCULAR ACCESS: CPT

## 2025-05-28 PROCEDURE — 80069 RENAL FUNCTION PANEL: CPT

## 2025-05-28 PROCEDURE — 6370000000 HC RX 637 (ALT 250 FOR IP): Performed by: HOSPITALIST

## 2025-05-28 PROCEDURE — C1751 CATH, INF, PER/CENT/MIDLINE: HCPCS

## 2025-05-28 PROCEDURE — 2060000000 HC ICU INTERMEDIATE R&B

## 2025-05-28 PROCEDURE — 36569 INSJ PICC 5 YR+ W/O IMAGING: CPT

## 2025-05-28 PROCEDURE — G0545 PR INHERENT VISIT TO INPT: HCPCS | Performed by: INTERNAL MEDICINE

## 2025-05-28 PROCEDURE — 6370000000 HC RX 637 (ALT 250 FOR IP): Performed by: NURSE PRACTITIONER

## 2025-05-28 PROCEDURE — 02HV33Z INSERTION OF INFUSION DEVICE INTO SUPERIOR VENA CAVA, PERCUTANEOUS APPROACH: ICD-10-PCS | Performed by: HOSPITALIST

## 2025-05-28 PROCEDURE — 86140 C-REACTIVE PROTEIN: CPT

## 2025-05-28 PROCEDURE — 6360000002 HC RX W HCPCS: Performed by: INTERNAL MEDICINE

## 2025-05-28 PROCEDURE — 6370000000 HC RX 637 (ALT 250 FOR IP): Performed by: INTERNAL MEDICINE

## 2025-05-28 PROCEDURE — 6370000000 HC RX 637 (ALT 250 FOR IP): Performed by: STUDENT IN AN ORGANIZED HEALTH CARE EDUCATION/TRAINING PROGRAM

## 2025-05-28 PROCEDURE — 2580000003 HC RX 258: Performed by: INTERNAL MEDICINE

## 2025-05-28 PROCEDURE — 85652 RBC SED RATE AUTOMATED: CPT

## 2025-05-28 PROCEDURE — 6370000000 HC RX 637 (ALT 250 FOR IP)

## 2025-05-28 PROCEDURE — 99232 SBSQ HOSP IP/OBS MODERATE 35: CPT | Performed by: INTERNAL MEDICINE

## 2025-05-28 PROCEDURE — 94760 N-INVAS EAR/PLS OXIMETRY 1: CPT

## 2025-05-28 PROCEDURE — 2500000003 HC RX 250 WO HCPCS: Performed by: INTERNAL MEDICINE

## 2025-05-28 RX ADMIN — LINEZOLID 600 MG: 600 TABLET, FILM COATED ORAL at 08:33

## 2025-05-28 RX ADMIN — PIPERACILLIN AND TAZOBACTAM 3375 MG: 3; .375 INJECTION, POWDER, LYOPHILIZED, FOR SOLUTION INTRAVENOUS at 17:02

## 2025-05-28 RX ADMIN — ACETAMINOPHEN 1000 MG: 500 TABLET ORAL at 05:45

## 2025-05-28 RX ADMIN — AMIODARONE HYDROCHLORIDE 400 MG: 200 TABLET ORAL at 20:19

## 2025-05-28 RX ADMIN — AMIODARONE HYDROCHLORIDE 400 MG: 200 TABLET ORAL at 08:33

## 2025-05-28 RX ADMIN — GABAPENTIN 100 MG: 100 CAPSULE ORAL at 08:33

## 2025-05-28 RX ADMIN — Medication 2 G: at 17:01

## 2025-05-28 RX ADMIN — APIXABAN 5 MG: 5 TABLET, FILM COATED ORAL at 08:33

## 2025-05-28 RX ADMIN — HYDROMORPHONE HYDROCHLORIDE 1 MG: 2 TABLET ORAL at 20:16

## 2025-05-28 RX ADMIN — HYDROMORPHONE HYDROCHLORIDE 1 MG: 2 TABLET ORAL at 08:28

## 2025-05-28 RX ADMIN — Medication 2 G: at 13:00

## 2025-05-28 RX ADMIN — HYDROMORPHONE HYDROCHLORIDE 1 MG: 2 TABLET ORAL at 03:50

## 2025-05-28 RX ADMIN — PIPERACILLIN AND TAZOBACTAM 3375 MG: 3; .375 INJECTION, POWDER, LYOPHILIZED, FOR SOLUTION INTRAVENOUS at 09:12

## 2025-05-28 RX ADMIN — METOPROLOL SUCCINATE 50 MG: 50 TABLET, EXTENDED RELEASE ORAL at 08:33

## 2025-05-28 RX ADMIN — LINEZOLID 600 MG: 600 TABLET, FILM COATED ORAL at 20:18

## 2025-05-28 RX ADMIN — POLYETHYLENE GLYCOL 3350 17 G: 17 POWDER, FOR SOLUTION ORAL at 08:29

## 2025-05-28 RX ADMIN — FUROSEMIDE 20 MG: 20 TABLET ORAL at 08:33

## 2025-05-28 RX ADMIN — Medication 2 G: at 08:33

## 2025-05-28 RX ADMIN — Medication 15 G: at 08:29

## 2025-05-28 RX ADMIN — ACETAMINOPHEN 1000 MG: 500 TABLET ORAL at 20:18

## 2025-05-28 RX ADMIN — GABAPENTIN 100 MG: 100 CAPSULE ORAL at 20:19

## 2025-05-28 RX ADMIN — SODIUM CHLORIDE, PRESERVATIVE FREE 10 ML: 5 INJECTION INTRAVENOUS at 20:20

## 2025-05-28 RX ADMIN — HYDROMORPHONE HYDROCHLORIDE 1 MG: 2 TABLET ORAL at 13:00

## 2025-05-28 RX ADMIN — DICLOFENAC SODIUM 4 G: 10 GEL TOPICAL at 20:19

## 2025-05-28 RX ADMIN — GABAPENTIN 100 MG: 100 CAPSULE ORAL at 13:00

## 2025-05-28 RX ADMIN — ACETAMINOPHEN 1000 MG: 500 TABLET ORAL at 13:00

## 2025-05-28 RX ADMIN — PIPERACILLIN AND TAZOBACTAM 3375 MG: 3; .375 INJECTION, POWDER, LYOPHILIZED, FOR SOLUTION INTRAVENOUS at 03:59

## 2025-05-28 RX ADMIN — Medication 6 MG: at 20:18

## 2025-05-28 RX ADMIN — APIXABAN 5 MG: 5 TABLET, FILM COATED ORAL at 20:18

## 2025-05-28 ASSESSMENT — PAIN DESCRIPTION - ORIENTATION
ORIENTATION: MID
ORIENTATION: RIGHT;LEFT
ORIENTATION: MID

## 2025-05-28 ASSESSMENT — PAIN DESCRIPTION - LOCATION
LOCATION: COCCYX
LOCATION: SACRUM
LOCATION: COCCYX
LOCATION: KNEE
LOCATION: COCCYX

## 2025-05-28 ASSESSMENT — PAIN DESCRIPTION - ONSET
ONSET: PROGRESSIVE
ONSET: ON-GOING

## 2025-05-28 ASSESSMENT — PAIN SCALES - GENERAL
PAINLEVEL_OUTOF10: 10
PAINLEVEL_OUTOF10: 10
PAINLEVEL_OUTOF10: 9
PAINLEVEL_OUTOF10: 10
PAINLEVEL_OUTOF10: 8
PAINLEVEL_OUTOF10: 10
PAINLEVEL_OUTOF10: 8
PAINLEVEL_OUTOF10: 10
PAINLEVEL_OUTOF10: 9

## 2025-05-28 ASSESSMENT — PAIN DESCRIPTION - DESCRIPTORS
DESCRIPTORS: BURNING
DESCRIPTORS: BURNING
DESCRIPTORS: ACHING
DESCRIPTORS: BURNING
DESCRIPTORS: BURNING

## 2025-05-28 ASSESSMENT — PAIN DESCRIPTION - PAIN TYPE
TYPE: CHRONIC PAIN
TYPE: CHRONIC PAIN

## 2025-05-28 ASSESSMENT — PAIN DESCRIPTION - FREQUENCY
FREQUENCY: CONTINUOUS
FREQUENCY: CONTINUOUS

## 2025-05-28 NOTE — PROGRESS NOTES
Comprehensive Nutrition Assessment    Type and Reason for Visit:  Reassess  Nutrition Recommendations/Plan:   Continue Dysphagia soft and bite sized texture  Continue fluids per provider  Continue frequency of Ensure to tid but increase to 2 at breakfast and dinner  Add Magic Cup tid      Malnutrition Assessment:  Malnutrition Status:  No malnutrition (05/22/25 1342)    Context:  Acute Illness     Findings of the 6 clinical characteristics of malnutrition:  Energy Intake:  Mild decrease in energy intake  Weight Loss:  Unable to assess     Body Fat Loss:  No body fat loss     Muscle Mass Loss:  No muscle mass loss    Fluid Accumulation:  No fluid accumulation     Strength:  Not Performed    Nutrition Assessment:    FU  Noted that  pt is s/p excisional debridement of sacral wound on 5/20/25. Wound Vac placed. Dysphagia Soft and bite sized texture continues to be offered. Acceptance of meals is variable,but generally less than 50%. Noted that pt is taking the Ensure. Pt is on a fluid modification of 1800 ml at this time. With po at meals generally < 50%, will increase Ensure to 2 at breakfast and dinner. Will also add Magic Cup tid to help meet est needs for healing. Noted pt with no BM since 5/23, which may be contributing toward poor intake. MD addressing. Pt at risk for nutrition compromise given poor po intake and increased healing needs. Will continue to follow progress. Noted plan for facility at discharge.    Nutrition Related Findings:    Labs reviewed. Noted Na of 129 and creat of .4. Noted pitting +2 edema to BLE. Wound Type: Unstageable (sacral wound with wound vac)       Current Nutrition Intake & Therapies:    Average Meal Intake: 1-25%  Average Supplements Intake: %  ADULT DIET; Dysphagia - Soft and Bite Sized; 1800 ml  ADULT ORAL NUTRITION SUPPLEMENT; Breakfast, Lunch, Dinner; Standard High Calorie/High Protein Oral Supplement  ADULT ORAL NUTRITION SUPPLEMENT; Breakfast, Lunch, Dinner; Frozen

## 2025-05-28 NOTE — PROGRESS NOTES
Arrived to place PICC line with bedside RN NAME. Pre-procedure and timeout done with RN, discussed limitations of placement and allergies. Consent confirmed. Vital signs stable. Labs, allergies, medications, and code status reviewed. No contraindications noted.    Procedure explained to pt, including the risk and benefits of the procedure. All questions answered. Pt verbalizes understanding of the procedure and states no more questions.     Pt's basilic, brachial, cephalic are all easily collapsible with no indication for a clot. Vein selected is large enough for catheter. Pt tolerated sterile procedure well, with no difficulty accessing basilic vein, when accessed - blood was free flowing and non-pulsatile. Guidewire, introducer, and catheter went in smoothly.     PICC line verified with ECG:  Please replace all existing IV tubing with new IV tubing prior to using the PICC for current IV infusions.  Please remove any PIVs from PICC arm.  All of the above may be sources of infection or an increase chance of a clot.      Post procedure - reorganized pt table, placed pt in lowest position, with call light and educated on line care. Instructed pt/RN not to use arm for at least 30min to avoid bleeding. Reported off to bedside RN.        [phone]

## 2025-05-28 NOTE — PROGRESS NOTES
Infectious Diseases Inpatient Progress  Note    CHIEF COMPLAINT:     Infected sacral decubitus ulcer  Soft tissue infection  Poor mobility  Acute pulm embolism  Lower extremity edema  WBC elevation  Hyponatremia     HISTORY OF PRESENT ILLNESS:  79 y.o. female with significant history for spinal stenosis admitted to hospital secondary to lower acuity pain, poor mobility noted to have infected sacral decubitus ulcer with ongoing drainage.  CT head on admission negative, CT chest indicated pulmonary emboli involving the right lower lobe artery extending into segmental branch, CT of the lumbar spine sacral decubitus ulcer without underlying osteomyelitis, extensive bilateral foraminal arthropathy with moderate spinal stenosis and narrowing noted at L3-L5.  Due to ongoing infection in the sacral area was evaluated by general surgery patient now taken for surgery for debridement.  Labs indicate sodium was 124 LFT normal WBC 11.3 hemoglobin 10.1.  Urinalysis negative.  We are consulted for recommendations      Interval History : Patient is status post debridement of skin subcutaneous tissue and fascia of the sacral area secondary to ongoing infection and cellulitis wound culture positive for E. coli and prior wound cx with MRSA. And Bacteroides -     Complains of ongoing lower back pain wound VAC in place status post PICC line for IV antibiotics remains on anticoagulation for pulmonary embolism will likely need placement to complete antibiotic course will update YASEMIN      Past Medical History:    Past Medical History:   Diagnosis Date    Spinal stenosis        Past Surgical History:    Past Surgical History:   Procedure Laterality Date    EYE SURGERY      cataract ou    RECTAL SURGERY N/A 5/20/2025    EXCISIONAL DEBRIDEMENT SACRAL ULCER performed by Milo Champion MD at [facility] OR       Current Medications:    Outpatient Medications Marked as Taking for the 5/19/25 encounter (Hospital Encounter)   Medication  List   Diagnosis Code    Sacral wound, initial encounter S31.000A    Cellulitis of sacral region L03.319    Acute cervical myofascial strain S16.1XXA    Bilateral leg edema R60.0    Bilateral pulmonary embolism ([facility]) I26.99    Compression of lumbar vertebra ([facility]) S32.000A    Hyponatremia E87.1    Skin ulcer of sacrum ([facility]) L98.429    Pressure injury of skin of sacral region L89.159    E coli infection A49.8    Decubitus ulcer of sacral region, stage 3 ([facility]) L89.153    Atrial fibrillation ([facility]) I48.91    Atrial fibrillation with RVR ([facility]) I48.91    Infection requiring contact isolation precautions B99.9    MRSA infection (methicillin-resistant Staphylococcus aureus) A49.02    Head contusion S00.93XA    Paroxysmal atrial fibrillation ([facility]) I48.0    Bacteroides infection A49.8    MRSA (methicillin resistant Staphylococcus aureus) infection A49.02        ICD-10-CM    1. Cellulitis of sacral region  L03.319 oxyCODONE-acetaminophen (PERCOCET) 5-325 MG per tablet      2. Pressure injury of skin of sacral region, unspecified injury stage  L89.159       3. Bilateral leg edema  R60.0       4. Contusion of head, unspecified part of head, initial encounter  S00.93XA       5. Acute cervical myofascial strain, initial encounter  S16.1XXA       6. Hyponatremia  E87.1       7. Atrial fibrillation, unspecified type ([facility])  I48.91       8. Bilateral pulmonary embolism ([facility])  I26.99       9. Compression fracture of lumbar vertebra, unspecified lumbar vertebral level, initial encounter ([facility])  S32.000A       10. Pulmonary embolism without acute cor pulmonale, unspecified chronicity, unspecified pulmonary embolism type ([facility])  I26.99 Vascular duplex lower extremity venous bilateral     Vascular duplex lower extremity venous bilateral      11. Skin ulcer of sacrum, unspecified ulcer stage ([facility])  L98.429 Culture, Surgical     Culture, Surgical      12. Paroxysmal atrial fibrillation ([facility])  I48.0 Echo (TTE) complete (PRN

## 2025-05-28 NOTE — CARE COORDINATION
DISCHARGE PLANNING:    DC plan to [facility] [place].  Precert still pending per [facility] portal.  Will need HENS and transport.    #[phone]  Electronically signed by Josey German RN on 5/28/2025 at 9:51 AM

## 2025-05-28 NOTE — CARE COORDINATION
HENS completed for [facility]. Document ID number is [id number].     Respectfully submitted,    Keren FERNANDEZ, ALPESHS  [facility] [place]   [phone]    Electronically signed by REBECCA Luz, LSW on 5/28/2025 at 2:58 PM

## 2025-05-28 NOTE — PLAN OF CARE
Problem: Discharge Planning  Goal: Discharge to home or other facility with appropriate resources  5/28/2025 1926 by Haley Morris RN  Outcome: Progressing  5/28/2025 1229 by Brianda Astudillo RN  Outcome: Not Progressing     Problem: Pain  Goal: Verbalizes/displays adequate comfort level or baseline comfort level  5/28/2025 1926 by Haley Morris RN  Outcome: Progressing  5/28/2025 1229 by Brianda Astudillo RN  Outcome: Not Progressing     Problem: Skin/Tissue Integrity  Goal: Skin integrity remains intact  Description: 1.  Monitor for areas of redness and/or skin breakdown2.  Assess vascular access sites hourly3.  Every 4-6 hours minimum:  Change oxygen saturation probe site4.  Every 4-6 hours:  If on nasal continuous positive airway pressure, respiratory therapy assess nares and determine need for appliance change or resting period  5/28/2025 1926 by Haley Morris RN  Outcome: Progressing  5/28/2025 1229 by Brianda Astudillo RN  Outcome: Not Progressing     Problem: Safety - Adult  Goal: Free from fall injury  5/28/2025 1926 by Haley Morris RN  Outcome: Progressing  5/28/2025 1229 by Brianda Astudillo RN  Outcome: Not Progressing     Problem: Nutrition Deficit:  Goal: Optimize nutritional status  5/28/2025 1926 by Haley Morris RN  Outcome: Progressing  Flowsheets (Taken 5/28/2025 1701 by Addy Vo, RD, LD)  Nutrient intake appropriate for improving, restoring, or maintaining nutritional needs:   Assess nutritional status and recommend course of action   Monitor oral intake, labs, and treatment plans   Recommend appropriate diets, oral nutritional supplements, and vitamin/mineral supplements  5/28/2025 1229 by Brianda Astudillo RN  Outcome: Not Progressing     Problem: ABCDS Injury Assessment  Goal: Absence of physical injury  5/28/2025 1926 by Haley Morris RN  Outcome: Progressing  5/28/2025 1229 by Brianda Astudillo RN  Outcome: Not Progressing     Problem:

## 2025-05-28 NOTE — PROGRESS NOTES
Patient ID: Radha Carcamo  Referring/ Physician: Karan Radford MD      Summary:   Radha Carcamo is being seen by nephrology for hyponatremia .     Reason for admission: sacral wound       Interval Hx:   Seen at bedside  No complaints.   No edema        > 129   BP 99/64        Assessment/Plan:   - Continue Urena 15 g daily  - cont salt tabs 2 g 3 times daily and p.o. Lasix 20 mg once daily    Follow up with Dr Millard in 2 weeks.       Hyponatremia   Presenting with sodium of 124  Received IV fluids and sodium jacob to 131  Goal correction 4-6 meq in 24 hrs  Replace potassium     Hypokalemia   Replacing as needed.       Sacral ulcer  On antibiotics.         [facility] would like to thank you for the opportunity to serve this patient. Please call with any questions or concerns.    Hawa Curiel MD  [facility]  [address] [phone]  Fax: [phone]  Office: [phone]    HPI  Radha Carcamo is a 79 y.o. female  with  has a past medical history of Spinal stenosis. who presented with sacral decubitus ulcer. .was found to have incidental finding of PE on chest CT. She was started on antibiotics and received IV fluids. Placed on heparin gtt. Nephrology consulted for hyponatremia with admitted sodium level of 124.      Review of Systems:   As above.     PMH/SH/FH:    Medical Hx: reviewed and updated as appropriate  Past Medical History:   Diagnosis Date    Spinal stenosis          Surgical Hx: reviewed and updated as appropriate   has a past surgical history that includes eye surgery and Rectal surgery (N/A, 5/20/2025).     Social Hx: reviewed and updated as appropriate  Social History     Tobacco Use    Smoking status: Former     Current packs/day: 1.00     Average packs/day: 1 pack/day for 10.0 years (10.0 ttl pk-yrs)     Types: Cigarettes    Smokeless tobacco: Never   Substance Use Topics    Alcohol use: Yes     Comment: occ        Family

## 2025-05-29 LAB
DEPRECATED RDW RBC AUTO: 13.5 % (ref 12.4–15.4)
HCT VFR BLD AUTO: 28.9 % (ref 36–48)
HGB BLD-MCNC: 9.5 G/DL (ref 12–16)
MCH RBC QN AUTO: 32.3 PG (ref 26–34)
MCHC RBC AUTO-ENTMCNC: 33 G/DL (ref 31–36)
MCV RBC AUTO: 97.6 FL (ref 80–100)
PLATELET # BLD AUTO: 575 K/UL (ref 135–450)
PMV BLD AUTO: 7.2 FL (ref 5–10.5)
RBC # BLD AUTO: 2.96 M/UL (ref 4–5.2)
WBC # BLD AUTO: 11.2 K/UL (ref 4–11)

## 2025-05-29 PROCEDURE — 6370000000 HC RX 637 (ALT 250 FOR IP): Performed by: SURGERY

## 2025-05-29 PROCEDURE — 6370000000 HC RX 637 (ALT 250 FOR IP): Performed by: STUDENT IN AN ORGANIZED HEALTH CARE EDUCATION/TRAINING PROGRAM

## 2025-05-29 PROCEDURE — 2580000003 HC RX 258: Performed by: INTERNAL MEDICINE

## 2025-05-29 PROCEDURE — 6370000000 HC RX 637 (ALT 250 FOR IP): Performed by: INTERNAL MEDICINE

## 2025-05-29 PROCEDURE — 6360000002 HC RX W HCPCS: Performed by: INTERNAL MEDICINE

## 2025-05-29 PROCEDURE — 6370000000 HC RX 637 (ALT 250 FOR IP): Performed by: HOSPITALIST

## 2025-05-29 PROCEDURE — 2500000003 HC RX 250 WO HCPCS: Performed by: INTERNAL MEDICINE

## 2025-05-29 PROCEDURE — 85027 COMPLETE CBC AUTOMATED: CPT

## 2025-05-29 PROCEDURE — 6370000000 HC RX 637 (ALT 250 FOR IP): Performed by: NURSE PRACTITIONER

## 2025-05-29 PROCEDURE — 2060000000 HC ICU INTERMEDIATE R&B

## 2025-05-29 PROCEDURE — 6370000000 HC RX 637 (ALT 250 FOR IP)

## 2025-05-29 PROCEDURE — 99232 SBSQ HOSP IP/OBS MODERATE 35: CPT | Performed by: INTERNAL MEDICINE

## 2025-05-29 PROCEDURE — 94760 N-INVAS EAR/PLS OXIMETRY 1: CPT

## 2025-05-29 RX ORDER — SENNA AND DOCUSATE SODIUM 50; 8.6 MG/1; MG/1
2 TABLET, FILM COATED ORAL 2 TIMES DAILY
Status: DISCONTINUED | OUTPATIENT
Start: 2025-05-29 | End: 2025-05-30 | Stop reason: HOSPADM

## 2025-05-29 RX ORDER — POLYETHYLENE GLYCOL 3350 17 G/17G
17 POWDER, FOR SOLUTION ORAL 2 TIMES DAILY
Status: DISCONTINUED | OUTPATIENT
Start: 2025-05-29 | End: 2025-05-30 | Stop reason: HOSPADM

## 2025-05-29 RX ADMIN — POLYETHYLENE GLYCOL 3350 17 G: 17 POWDER, FOR SOLUTION ORAL at 08:16

## 2025-05-29 RX ADMIN — Medication 2 G: at 11:12

## 2025-05-29 RX ADMIN — GABAPENTIN 100 MG: 100 CAPSULE ORAL at 08:18

## 2025-05-29 RX ADMIN — LINEZOLID 600 MG: 600 TABLET, FILM COATED ORAL at 20:46

## 2025-05-29 RX ADMIN — POLYETHYLENE GLYCOL 3350 17 G: 17 POWDER, FOR SOLUTION ORAL at 20:46

## 2025-05-29 RX ADMIN — PIPERACILLIN AND TAZOBACTAM 3375 MG: 3; .375 INJECTION, POWDER, LYOPHILIZED, FOR SOLUTION INTRAVENOUS at 03:10

## 2025-05-29 RX ADMIN — HYDROMORPHONE HYDROCHLORIDE 1 MG: 2 TABLET ORAL at 06:09

## 2025-05-29 RX ADMIN — HYDROMORPHONE HYDROCHLORIDE 1 MG: 2 TABLET ORAL at 18:32

## 2025-05-29 RX ADMIN — SENNOSIDES, DOCUSATE SODIUM 2 TABLET: 50; 8.6 TABLET, FILM COATED ORAL at 20:45

## 2025-05-29 RX ADMIN — ACETAMINOPHEN 1000 MG: 500 TABLET ORAL at 05:54

## 2025-05-29 RX ADMIN — APIXABAN 5 MG: 5 TABLET, FILM COATED ORAL at 20:46

## 2025-05-29 RX ADMIN — PIPERACILLIN AND TAZOBACTAM 3375 MG: 3; .375 INJECTION, POWDER, LYOPHILIZED, FOR SOLUTION INTRAVENOUS at 08:15

## 2025-05-29 RX ADMIN — Medication 2 G: at 08:18

## 2025-05-29 RX ADMIN — SENNOSIDES, DOCUSATE SODIUM 2 TABLET: 50; 8.6 TABLET, FILM COATED ORAL at 14:17

## 2025-05-29 RX ADMIN — ACETAMINOPHEN 1000 MG: 500 TABLET ORAL at 20:46

## 2025-05-29 RX ADMIN — GABAPENTIN 100 MG: 100 CAPSULE ORAL at 20:46

## 2025-05-29 RX ADMIN — Medication 2 G: at 17:44

## 2025-05-29 RX ADMIN — SODIUM CHLORIDE, PRESERVATIVE FREE 10 ML: 5 INJECTION INTRAVENOUS at 08:19

## 2025-05-29 RX ADMIN — PIPERACILLIN AND TAZOBACTAM 3375 MG: 3; .375 INJECTION, POWDER, LYOPHILIZED, FOR SOLUTION INTRAVENOUS at 17:44

## 2025-05-29 RX ADMIN — Medication 15 G: at 08:16

## 2025-05-29 RX ADMIN — FUROSEMIDE 20 MG: 20 TABLET ORAL at 08:18

## 2025-05-29 RX ADMIN — GABAPENTIN 100 MG: 100 CAPSULE ORAL at 14:17

## 2025-05-29 RX ADMIN — AMIODARONE HYDROCHLORIDE 400 MG: 200 TABLET ORAL at 20:46

## 2025-05-29 RX ADMIN — ACETAMINOPHEN 1000 MG: 500 TABLET ORAL at 14:17

## 2025-05-29 RX ADMIN — HYDROMORPHONE HYDROCHLORIDE 1 MG: 2 TABLET ORAL at 11:12

## 2025-05-29 RX ADMIN — HYDROMORPHONE HYDROCHLORIDE 1 MG: 2 TABLET ORAL at 00:26

## 2025-05-29 RX ADMIN — METOPROLOL SUCCINATE 50 MG: 50 TABLET, EXTENDED RELEASE ORAL at 08:18

## 2025-05-29 RX ADMIN — Medication 6 MG: at 20:55

## 2025-05-29 RX ADMIN — AMIODARONE HYDROCHLORIDE 400 MG: 200 TABLET ORAL at 08:18

## 2025-05-29 RX ADMIN — APIXABAN 5 MG: 5 TABLET, FILM COATED ORAL at 08:18

## 2025-05-29 RX ADMIN — LINEZOLID 600 MG: 600 TABLET, FILM COATED ORAL at 08:18

## 2025-05-29 ASSESSMENT — PAIN - FUNCTIONAL ASSESSMENT
PAIN_FUNCTIONAL_ASSESSMENT: PREVENTS OR INTERFERES SOME ACTIVE ACTIVITIES AND ADLS
PAIN_FUNCTIONAL_ASSESSMENT: ACTIVITIES ARE NOT PREVENTED
PAIN_FUNCTIONAL_ASSESSMENT: PREVENTS OR INTERFERES SOME ACTIVE ACTIVITIES AND ADLS
PAIN_FUNCTIONAL_ASSESSMENT: PREVENTS OR INTERFERES SOME ACTIVE ACTIVITIES AND ADLS

## 2025-05-29 ASSESSMENT — PAIN DESCRIPTION - ORIENTATION
ORIENTATION: MID
ORIENTATION: MID;POSTERIOR
ORIENTATION: MID;POSTERIOR
ORIENTATION: MID
ORIENTATION: RIGHT;LEFT
ORIENTATION: MID;POSTERIOR
ORIENTATION: RIGHT;LEFT

## 2025-05-29 ASSESSMENT — PAIN SCALES - GENERAL
PAINLEVEL_OUTOF10: 8
PAINLEVEL_OUTOF10: 10
PAINLEVEL_OUTOF10: 0
PAINLEVEL_OUTOF10: 10

## 2025-05-29 ASSESSMENT — PAIN DESCRIPTION - DESCRIPTORS
DESCRIPTORS: ACHING
DESCRIPTORS: BURNING
DESCRIPTORS: BURNING
DESCRIPTORS: ACHING
DESCRIPTORS: BURNING
DESCRIPTORS: ACHING
DESCRIPTORS: ACHING;DISCOMFORT;BURNING

## 2025-05-29 ASSESSMENT — PAIN DESCRIPTION - PAIN TYPE
TYPE: ACUTE PAIN
TYPE: ACUTE PAIN

## 2025-05-29 ASSESSMENT — PAIN DESCRIPTION - LOCATION
LOCATION: KNEE
LOCATION: KNEE
LOCATION: BUTTOCKS
LOCATION: BUTTOCKS
LOCATION: COCCYX;BUTTOCKS
LOCATION: BUTTOCKS
LOCATION: COCCYX

## 2025-05-29 ASSESSMENT — PAIN DESCRIPTION - FREQUENCY
FREQUENCY: CONTINUOUS
FREQUENCY: CONTINUOUS

## 2025-05-29 ASSESSMENT — PAIN DESCRIPTION - ONSET
ONSET: ON-GOING
ONSET: ON-GOING

## 2025-05-29 ASSESSMENT — PAIN SCALES - WONG BAKER: WONGBAKER_NUMERICALRESPONSE: NO HURT

## 2025-05-29 NOTE — CARE COORDINATION
DISCHARGE PLANNING:    DC plan to [facility] [place]. Precert pending.  Spoke with [facility], requested facilities that denied since [facility] is out of network.  Information given.  Will notify CM with precert out come.  HENS completed.  Will need transport.      #[phone]  Electronically signed by Josey German RN on 5/29/2025 at 10:53 AM

## 2025-05-29 NOTE — PROGRESS NOTES
When turning patient at 1600, patient noted to have pink urine in drainage back. MD Harris notified of finding. Patient had small bowel movement and wound vac found to be leaking, dressing reinforced. Will plan for wound vac change tomorrow per wound care RN note.

## 2025-05-29 NOTE — CARE COORDINATION
Wound care following for vac dressing changes.   Awaiting d/c planning.   Will plan to change wound vac dressing tomorrow unless d/c prior.   Continuing to follow.  Electronically signed by Gabriela Barreto RN CWOCN on 5/29/2025 at 2:36 PM

## 2025-05-29 NOTE — PROGRESS NOTES
V2.0  [facility] [place] Progress Note      Name:  Radha Carcamo /Age/Sex: 1945  (79 y.o. female)   MRN & CSN:  [id number] & [id number] Encounter Date/Time: 2025 8:25 AM EDT    Location:  [id number] PCP: Madai Morocho APRN - NP       Hospital Day: 11    Assessment and Plan:   Radha Carcamo is a 79 y.o. female p/w infected decubitus ulcer      Plan:  Infected sacral decubitus ulcer  - S/p debridement  -Wound culture positive for E. coli and anaerobes  - ID note  reviewed: Deep extensive wound.  Zosyn through 6/15.  Oral linezolid for 14 days  Hyponatremia  - Nephrology note reviewed: Continue urea packet 15 g daily, salt tabs 2 g 3 times daily with p.o. Lasix 20 mg daily  Paroxysmal atrial fibrillation with RVR  AVNRT  -S/p cardioversion   - Telemetry strip  at 5 AM independently interpreted as sinus rhythm  - Amiodarone 400 mg for 1 week, followed by 200 daily  -Metoprolol 50 mg daily  - AC with Eliquis  - OP follow-up with EP in 6 weeks  Acute PE  - AC with Eliquis  Debility  -pt completely dependent on boyfriend at baseline and did not appear to have much interest in changing the situation. In coordination with patient's boyfriend, was able to tease out that patient does not wish to remain bedbound for the remainder of her life.  Again in coordination with boyfriend, emphasized to patient that if she wished that to become a reality, then she would need to make some effort and work with therapy.  Patient noted that she had been rather rude to therapy when they had tried to work with her earlier in the admission, but ultimately did agree to work with them.  Specifically told the patient that part of that would include getting her out of the bed up to the chair.  PT OT ordered  Constipation  - Aggressive oral regimen  - If failing to have BM consider suppository tomorrow    Ppx: AC with Eliquis  Dispo: SNF pending pre-CERT    Subjective:     Chief Complaint: Leg Pain (Patient to

## 2025-05-29 NOTE — PROGRESS NOTES
Infectious Diseases Inpatient Progress  Note    CHIEF COMPLAINT:     Infected sacral decubitus ulcer  Soft tissue infection  Poor mobility  Acute pulm embolism  Lower extremity edema  WBC elevation  Hyponatremia     HISTORY OF PRESENT ILLNESS:  79 y.o. female with significant history for spinal stenosis admitted to hospital secondary to lower acuity pain, poor mobility noted to have infected sacral decubitus ulcer with ongoing drainage.  CT head on admission negative, CT chest indicated pulmonary emboli involving the right lower lobe artery extending into segmental branch, CT of the lumbar spine sacral decubitus ulcer without underlying osteomyelitis, extensive bilateral foraminal arthropathy with moderate spinal stenosis and narrowing noted at L3-L5.  Due to ongoing infection in the sacral area was evaluated by general surgery patient now taken for surgery for debridement.  Labs indicate sodium was 124 LFT normal WBC 11.3 hemoglobin 10.1.  Urinalysis negative.  We are consulted for recommendations      Interval History : Patient is status post debridement of skin subcutaneous tissue and fascia of the sacral area secondary to ongoing infection and cellulitis wound culture positive for E. coli and prior wound cx with MRSA. And Bacteroides -     Complains of ongoing lower back pain wound VAC in place status post PICC line for IV antibiotics remains on anticoagulation for pulmonary embolism will likely need placement to complete antibiotic course will update YASEMIN    Tolerating regular diet.  PICC line working well  discussed with the patient about antibiotic plan, will monitor ESR and CRP for response      Past Medical History:    Past Medical History:   Diagnosis Date    Spinal stenosis        Past Surgical History:    Past Surgical History:   Procedure Laterality Date    EYE SURGERY      cataract ou    RECTAL SURGERY N/A 5/20/2025    EXCISIONAL DEBRIDEMENT SACRAL ULCER performed by Milo Champion MD at

## 2025-05-29 NOTE — PLAN OF CARE
Problem: Discharge Planning  Goal: Discharge to home or other facility with appropriate resources  5/29/2025 1917 by Rhett Mclaughlin RN  Outcome: Progressing  5/29/2025 1131 by Brianda Astudillo RN  Outcome: Progressing     Problem: Pain  Goal: Verbalizes/displays adequate comfort level or baseline comfort level  5/29/2025 1917 by Rhett Mclaughlin RN  Outcome: Progressing  5/29/2025 1131 by Brianda Astudillo RN  Outcome: Progressing     Problem: Skin/Tissue Integrity  Goal: Skin integrity remains intact  Description: 1.  Monitor for areas of redness and/or skin breakdown2.  Assess vascular access sites hourly3.  Every 4-6 hours minimum:  Change oxygen saturation probe site4.  Every 4-6 hours:  If on nasal continuous positive airway pressure, respiratory therapy assess nares and determine need for appliance change or resting period  5/29/2025 1917 by Rhett Mclaughlin RN  Outcome: Progressing  Flowsheets (Taken 5/29/2025 1915)  Skin Integrity Remains Intact:   Monitor for areas of redness and/or skin breakdown   Assess vascular access sites hourly   Every 4-6 hours minimum:  Change oxygen saturation probe site  5/29/2025 1131 by Brianda Astudillo RN  Outcome: Progressing     Problem: Safety - Adult  Goal: Free from fall injury  5/29/2025 1917 by Rhett Mclaughlin RN  Outcome: Progressing  Flowsheets (Taken 5/29/2025 1915)  Free From Fall Injury:   Instruct family/caregiver on patient safety   Based on caregiver fall risk screen, instruct family/caregiver to ask for assistance with transferring infant if caregiver noted to have fall risk factors  5/29/2025 1131 by Brianda Astudillo RN  Outcome: Progressing     Problem: ABCDS Injury Assessment  Goal: Absence of physical injury  5/29/2025 1917 by Rhett Mclaughlin RN  Outcome: Progressing  Flowsheets (Taken 5/29/2025 1915)  Absence of Physical Injury: Implement safety measures based on patient assessment  5/29/2025 1131 by Brianda Astudillo RN  Outcome: Progressing     Problem:  Nutrition Deficit:  Goal: Optimize nutritional status  5/29/2025 1917 by Rhett Mclaughlin, RN  Outcome: Progressing  5/29/2025 1131 by Brianda Astudillo, RN  Outcome: Progressing

## 2025-05-30 VITALS
BODY MASS INDEX: 30.74 KG/M2 | TEMPERATURE: 98.8 F | HEART RATE: 77 BPM | OXYGEN SATURATION: 94 % | SYSTOLIC BLOOD PRESSURE: 112 MMHG | DIASTOLIC BLOOD PRESSURE: 67 MMHG | HEIGHT: 63 IN | WEIGHT: 173.5 LBS | RESPIRATION RATE: 17 BRPM

## 2025-05-30 PROCEDURE — 97166 OT EVAL MOD COMPLEX 45 MIN: CPT

## 2025-05-30 PROCEDURE — 6370000000 HC RX 637 (ALT 250 FOR IP): Performed by: SURGERY

## 2025-05-30 PROCEDURE — 97535 SELF CARE MNGMENT TRAINING: CPT

## 2025-05-30 PROCEDURE — 97530 THERAPEUTIC ACTIVITIES: CPT

## 2025-05-30 PROCEDURE — 6370000000 HC RX 637 (ALT 250 FOR IP)

## 2025-05-30 PROCEDURE — 6370000000 HC RX 637 (ALT 250 FOR IP): Performed by: INTERNAL MEDICINE

## 2025-05-30 PROCEDURE — 6370000000 HC RX 637 (ALT 250 FOR IP): Performed by: STUDENT IN AN ORGANIZED HEALTH CARE EDUCATION/TRAINING PROGRAM

## 2025-05-30 PROCEDURE — 94760 N-INVAS EAR/PLS OXIMETRY 1: CPT

## 2025-05-30 PROCEDURE — 97162 PT EVAL MOD COMPLEX 30 MIN: CPT

## 2025-05-30 PROCEDURE — 2580000003 HC RX 258: Performed by: INTERNAL MEDICINE

## 2025-05-30 PROCEDURE — 6360000002 HC RX W HCPCS: Performed by: INTERNAL MEDICINE

## 2025-05-30 RX ADMIN — LINEZOLID 600 MG: 600 TABLET, FILM COATED ORAL at 09:50

## 2025-05-30 RX ADMIN — APIXABAN 5 MG: 5 TABLET, FILM COATED ORAL at 09:51

## 2025-05-30 RX ADMIN — Medication 2 G: at 14:34

## 2025-05-30 RX ADMIN — GABAPENTIN 100 MG: 100 CAPSULE ORAL at 14:34

## 2025-05-30 RX ADMIN — PIPERACILLIN AND TAZOBACTAM 3375 MG: 3; .375 INJECTION, POWDER, LYOPHILIZED, FOR SOLUTION INTRAVENOUS at 02:17

## 2025-05-30 RX ADMIN — FUROSEMIDE 20 MG: 20 TABLET ORAL at 09:50

## 2025-05-30 RX ADMIN — ACETAMINOPHEN 1000 MG: 500 TABLET ORAL at 05:48

## 2025-05-30 RX ADMIN — PIPERACILLIN AND TAZOBACTAM 3375 MG: 3; .375 INJECTION, POWDER, LYOPHILIZED, FOR SOLUTION INTRAVENOUS at 09:58

## 2025-05-30 RX ADMIN — GABAPENTIN 100 MG: 100 CAPSULE ORAL at 09:51

## 2025-05-30 RX ADMIN — AMIODARONE HYDROCHLORIDE 400 MG: 200 TABLET ORAL at 09:51

## 2025-05-30 RX ADMIN — METOPROLOL SUCCINATE 50 MG: 50 TABLET, EXTENDED RELEASE ORAL at 09:51

## 2025-05-30 RX ADMIN — Medication 15 G: at 09:55

## 2025-05-30 RX ADMIN — Medication 2 G: at 09:50

## 2025-05-30 RX ADMIN — ACETAMINOPHEN 1000 MG: 500 TABLET ORAL at 14:34

## 2025-05-30 ASSESSMENT — PAIN DESCRIPTION - FREQUENCY: FREQUENCY: CONTINUOUS

## 2025-05-30 ASSESSMENT — PAIN DESCRIPTION - LOCATION
LOCATION: COCCYX;LEG
LOCATION: COCCYX;LEG

## 2025-05-30 ASSESSMENT — PAIN SCALES - GENERAL
PAINLEVEL_OUTOF10: 3
PAINLEVEL_OUTOF10: 10

## 2025-05-30 ASSESSMENT — PAIN DESCRIPTION - ORIENTATION
ORIENTATION: MID;RIGHT;LEFT
ORIENTATION: RIGHT;LEFT;MID

## 2025-05-30 ASSESSMENT — PAIN DESCRIPTION - DESCRIPTORS
DESCRIPTORS: ACHING
DESCRIPTORS: ACHING;BURNING;DISCOMFORT

## 2025-05-30 ASSESSMENT — PAIN - FUNCTIONAL ASSESSMENT
PAIN_FUNCTIONAL_ASSESSMENT: ACTIVITIES ARE NOT PREVENTED
PAIN_FUNCTIONAL_ASSESSMENT: PREVENTS OR INTERFERES SOME ACTIVE ACTIVITIES AND ADLS

## 2025-05-30 ASSESSMENT — PAIN DESCRIPTION - ONSET: ONSET: ON-GOING

## 2025-05-30 ASSESSMENT — PAIN DESCRIPTION - PAIN TYPE: TYPE: ACUTE PAIN

## 2025-05-30 NOTE — PROGRESS NOTES
V2.0    [facility] Progress Note      Name:  Radha Carcamo /Age/Sex: 1945  (79 y.o. female)   MRN & CSN:  [id number] & [id number] Encounter Date/Time: 2025 12:30 PM EDT   Location:  [id number]-0112/5278-01 PCP: Madai Morocho APRN - NP     Attending:Karan Radford MD       Hospital Day: 12    Assessment and Recommendations   Radha Carcamo is a 79 y.o. female with pmh of  who presents with Sacral wound, initial encounter    Sacral wound  Telemetry monitering  Per ID  Dr longoria patient need IV antibiotic at discharge, patient will get a PICC line today  ID has completed YASEMIN   S/p wound vac +    Afib with RVR : controlled     S/p GARCIA CV on   Cont Amiodarone and B blocker  Cont Eliquis          Acute PE :     Acute pulmonary embolism  : cont Eliquis on  . Moniter Hb , moniter for signs of bleeding  BL LE doppler : neg for DVT  DuoNebs  Oxygen via nasal cannula as needed to keep oxygen saturation more than 90%        Hyponatremia : improving  Nephrology following     Could be 2/2 SIADH vs poor solute intake  Check Arabella, Uosm  Moniter BMP  Cont Urea powder        Replace K with PO and IV KCL per protocol  Moniter K and Mg     Constipation : get X ray KUB ---> no ileus   Give dulcolax prn        PT and OT evaluation    Pain management : DC IV dilaudid , give PO dilaudid prn        Continue home meds for other chronic conditions     DVT ppx  : lovenox     Diet : regular diet     Code status  : full code    Continue PT and OT    Discharge planning discussed with the case management in multidisciplinary round and plan is to discharge to SNF    Discussed with case management today and per case management pre-CERT is still pending    DC order placed     Diet ADULT DIET; Dysphagia - Soft and Bite Sized; 1800 ml  ADULT ORAL NUTRITION SUPPLEMENT; Breakfast, Lunch, Dinner; Standard High Calorie/High Protein Oral Supplement  ADULT ORAL NUTRITION SUPPLEMENT; Breakfast, Lunch, Dinner; Frozen Oral Supplement   DVT  Prophylaxis [] Lovenox, []  Heparin, [] SCDs, [] Ambulation,  [] Eliquis, [] Xarelto  [] Coumadin   Code Status Full Code   Disposition From:  Expected Disposition:   Estimated Date of Discharge:   Patient requires continued admission due to    Surrogate Decision Maker/ POA       Personally reviewed Lab Studies and Imaging      Discharge planning discussed with the case management in multidisciplinary round and plan is to discharge SNF        Telemetry reviewed by myself no ST elevation           Drugs that require monitoring for toxicity include IV  zosyn     and the method of monitoring was CBC , BMP and vitals monitering          Medical Decision Making:  The following items were considered in medical decision making:  Discussion of patient care with other providers  Reviewed clinical lab tests  Reviewed radiology tests  Reviewed other diagnostic tests/interventions  Independent review of radiologic images  Microbiology cultures and other micro tests reviewed            Subjective:     Chief Complaint:     Radha Carcamo is a 79 y.o. female who presents with    Patient is sitting in a chair while I saw this patient  S/p GARCIA CV on 5/23  AAO x 3  No acute overnight events  Denies chest pain or sob  Denies fever or chills or vomiting or abdominal pain         Review of Systems:      Pertinent positives and negatives discussed in HPI    Objective:     Intake/Output Summary (Last 24 hours) at 5/30/2025 1213  Last data filed at 5/30/2025 1037  Gross per 24 hour   Intake 100 ml   Output 1500 ml   Net -1400 ml      Vitals:   Vitals:    05/29/25 2046 05/30/25 0344 05/30/25 0426 05/30/25 0714   BP: 120/67 136/70  112/67   Pulse: 78 75  72   Resp: 16 14  15   Temp: 98.1 °F (36.7 °C) 97.5 °F (36.4 °C)  98.8 °F (37.1 °C)   TempSrc: Oral Oral  Oral   SpO2: 98% 93%  94%   Weight:   78.7 kg (173 lb 8 oz)    Height:             Physical Exam:      General: NAD  Eyes: EOMI  ENT: neck supple  Cardiovascular: Regular

## 2025-05-30 NOTE — PROGRESS NOTES
[facility] - Occupational Therapy   Phone: [phone]    Occupational Therapy  Facility/Department:[address] PROGRESSIVE CARE    [x] Initial Evaluation            [] Daily Treatment Note         [] Discharge Summary      Patient: Radha Carcamo   : 1945   MRN: [id number]   Date of Service:  2025    Staff Mobility Recommendation: MAXI    AM-PAC Score: 9  Discharge Recommendations: cont therapy, SNF    Radha Carcamo scored a 9 on the AM-PAC ADL Inpatient form. Current research shows that an AM-PAC score of 17 or less is typically not associated with a discharge to the patient's home setting. Based on the patient's AM-PAC score and their current ADL deficits, it is recommended that the patient have 3-5 sessions per week of Occupational Therapy at d/c to increase the patient's independence.  Please see assessment section for further patient specific details.    If patient discharges prior to next session this note will serve as a discharge summary.  Please see below for the latest assessment towards goals.       Admitting Diagnosis:  Sacral wound, initial encounter  Ordering Physician: Shun Harris MD   Current Admission Summary: Pt is a 78 yo F admitted with sacral wound, initially presented to ED  with inability to complete care needs at home, javi LE pain and swelling since 2024. Wound care consulted. EXCISIONAL DEBRIDEMENT OF SKIN, SUBCUTANEOUS TISSUE, FASCIA MEASURING 4 x 8.5 CM SACRAL ULCER per Dr. Champion . Cardioversion .     Past Medical History:  has a past medical history of Spinal stenosis.  Past Surgical History:  has a past surgical history that includes eye surgery and Rectal surgery (N/A, 2025).    Assessment  Activity Tolerance: Fair  Slow pace, required frequent encouragement and education to participate.  Impairments Requiring Therapeutic Intervention: decreased functional mobility, decreased ADL status, decreased ROM, decreased strength, decreased  with BUEs.  Grooming: setup assistance requires verbal cueing  Grooming Comments: pt washed face seated in recliner with setup, cues to initiate as pt initially resistive to completing activity without therapist's assist.   Lower Extremity Dressing: dependent  Dressing Comments: dep to don footies javi  Toileting: dependent.    Toileting Comments: pt soiled with BM upon arrival, dep hygiene from bed via rolling javi with assist x2Risa Keenan.   Instrumental Activities of Daily Living  No IADL completed on this date.    Functional Mobility  Bed Mobility:  Bed mobility not completed on this date.  Transfers:  Dependent transfer completed with mechanical maxisky/maximove lift system from bed to recliner.  Functional Mobility  No functional mobility completed on this date secondary to pt activity tolerance, pain, strength/balance.  Balance:  Pt unable to tolerate sitting/standing position for rating of balance component.     Cognition  Overall Cognitive Status: Impaired  Arousal/Alertness: appropriate responses to stimuli  Following Commands: follows one step commands consistently  Attention Span: appears intact  Insights: decreased awareness of deficits  Initiation: requires cues for all  Sequencing: requires cues for some  Pt benefits from slow pace d/t pain and resistance to OOB activity  Orientation:    oriented to person, oriented to place, oriented to time, and oriented to situation     Education  Barriers To Learning: hearing; insight  Patient Education: patient educated on goals, OT role and benefits, plan of care, ADL adaptive strategies, proper use of assistive device/equipment, discharge recommendations, use of MAXI for safety with OOB at this time given fxl deficits, importance of engaging in fxl activity as able to increase IND (feeding, grooming, etc)  Learning Assessment:  patient resistive towards education topics within session, would benefit from continued education  Safety Interventions: patient at risk for

## 2025-05-30 NOTE — CARE COORDINATION
DISCHARGE PLANNING:    DC plan to [facility] precert pending.  Per [employer] portal they are requesting therapy notes for authorization.  Patient should be able to be skilled due to wound care, wound vac and IV atb therapy.  ID notes and MD note uploaded to portal.      #[phone]  Electronically signed by Josey German RN on 5/30/2025 at 10:16 AM      Therapy notes uploaded to [employer], awaiting response.    #[phone]  Electronically signed by Josey German RN on 5/30/2025 at 11:08 AM    [facility] AUTHORIZATION INFORMATION    LOCATION:  [facility]    EFFECTIVE DATE:  5/30/25-6/5/25    NEXT REVIEW DATE:  6/4/25    AUTH#:  [id number]     #[phone]  Electronically signed by Josey German RN on 5/30/2025 at 11:36 AM

## 2025-05-30 NOTE — CARE COORDINATION
[x] Wound Vac dressing removed  [x] Moist NS dressing applied  OR   []   Home vac dressing applied  [x] Collection chamber removed and placed in red bag  [x] Wound Vac placed in red bin in dirty hold  [x] Supply chain called at [id number] michael to informed that Vac has been discontinued  [x] Serial Number [id number]  Electronically signed by Gabriela Barreto RN CWOCN on 5/30/2025 at 1:13 PM

## 2025-05-30 NOTE — CARE COORDINATION
Pt with plans for discharge. Current wound vac dressing removed. Wet to dry placed. Pt having frequent loose BM. Per bedside RN, pt received senokot and miralax due to constipation. Still with lake. Lisa care provided and linens changed.  Can defer to general surgery if lake needed for wound healing upon discharge. Will not continue to follow.  Electronically signed by Gabriela Barreto RN CWOCN on 5/30/2025 at 1:15 PM

## 2025-05-30 NOTE — CARE COORDINATION
Plans for vac dressing change today. Pt discharging to facility. Bedside RN to place wet to dry dressing and follow vac d/c process. Will update YASEMIN.   Electronically signed by Gabriela Barreto RN CWOCN on 5/30/2025 at 11:45 AM

## 2025-05-30 NOTE — PROGRESS NOTES
Comprehensive Nutrition Assessment    Type and Reason for Visit:  Reassess    Nutrition Recommendations/Plan:   Continue to encourage nutrition  Dysphagia soft and bite sized textures  Fluid restriction per MD  Ensure tid and Magic cup bid.       Malnutrition Assessment:  Malnutrition Status:  At risk for malnutrition (05/30/25 5150)    Context:  Acute Illness     Findings of the 6 clinical characteristics of malnutrition:  Energy Intake:  75% or less of estimated energy requirements for 7 or more days  Weight Loss:  No weight loss     Body Fat Loss:  No body fat loss     Muscle Mass Loss:  No muscle mass loss    Fluid Accumulation:  No fluid accumulation     Strength:  Not Performed    Nutrition Assessment:    Follow up:  PO intake has decreased for the past 2 days to less than 50%.  Patient continues on soft and bite sized textures, fluid restriction per MD.  Nutrition supplements tolerated well.  RD had visited patient to deliver her Ensure earlier this week after answering phone in kitchen.  Patient drank a whole Ensure vanilla in a few minutes and asked to get nurse to drink the chocolate one.    Nutrition Related Findings:    labs reviewed, no new metabolic panel since 5/28 Wound Type: Unstageable (sacral wound with wound vac)       Current Nutrition Intake & Therapies:    Average Meal Intake: 1-25%, 0%  Average Supplements Intake: %  ADULT DIET; Dysphagia - Soft and Bite Sized; 1800 ml  ADULT ORAL NUTRITION SUPPLEMENT; Breakfast, Lunch, Dinner; Standard High Calorie/High Protein Oral Supplement  ADULT ORAL NUTRITION SUPPLEMENT; Breakfast, Lunch, Dinner; Frozen Oral Supplement    Anthropometric Measures:  Height: 160 cm (5' 3\")  Ideal Body Weight (IBW): 115 lbs (52 kg)    Admission Body Weight: 65.8 kg (145 lb)  Current Body Weight: 78.7 kg (173 lb 8 oz),   IBW. Weight Source: Bed scale  Current BMI (kg/m2): 30.7                             BMI Categories: Overweight (BMI 25.0-29.9)    Estimated

## 2025-05-30 NOTE — PROGRESS NOTES
Report called to Anu galindo [facility] [facility]. Questions answered and call back number given.

## 2025-05-30 NOTE — PROGRESS NOTES
Patient discharged to [facility] at this time without complications. Patient discharged with lake in place, MD Radford verified to leave lake in for wound healing. PICC line in place for long term use of abx. Patient discharged without complications and SO Rolf notified of patient's discharge.

## 2025-05-30 NOTE — CARE COORDINATION
Case Management Discharge Note          Date / Time of Note: 5/30/2025 11:50 AM                  Patient Name: Radha Carcamo   YOB: 1945  Diagnosis: Hyponatremia [E87.1]  Bilateral leg edema [R60.0]  Bilateral pulmonary embolism (HCC) [I26.99]  Cellulitis of sacral region [L03.319]  Sacral wound, initial encounter [S31.000A]  Acute cervical myofascial strain, initial encounter [S16.1XXA]  Contusion of head, unspecified part of head, initial encounter [S00.93XA]  Atrial fibrillation, unspecified type (HCC) [I48.91]  Pressure injury of skin of sacral region, unspecified injury stage [L89.159]  Compression fracture of lumbar vertebra, unspecified lumbar vertebral level, initial encounter ([facility]) [S32.000A]   Date / Time: 5/19/2025 10:29 AM    Financial:  Payor: [place] [employer] MEDICARE / Plan: [facility] MEDICARE / Product Type: *No Product type* /      Pharmacy:    [facility] [address] [place] [address] [phone] -  [phone]  [address] [place] [address]  Phone: [phone] Fax: [phone]      Assistance purchasing medications?: Potential Assistance Purchasing Medications: No  Assistance provided by Case Management: None at this time    DISCHARGE Disposition: Skilled Nursing Facility (SNF)    Nursing Facility:   [facility] [address]  Phone: [phone]  Fax: [phone]     LOC at discharge: Skilled Nursing  YASEMIN Completed: Yes               Notification completed in HENS/PAS?:  Yes : CM has completed HENS online through secure website for SNF admission at [facility].   Document ID #: [id number]    Transportation:  Transportation PLAN for discharge: EMS transportation   Mode of Transport: Ambulance stretcher - S  Reason for medical transport: Bed confined: Meets the following criteria 1) unable to get out of bed without assistance or ambulate, 2) unable to safely sit up in a wheelchair, 3)

## 2025-05-30 NOTE — PROGRESS NOTES
Physical Therapy    [facility] - Physical Therapy   Phone: [phone]    Physical Therapy  Facility/Department:[address] PROGRESSIVE CARE    [x] Initial Evaluation            [x] Daily Treatment Note         [] Discharge Summary      Patient: Radha Carcamo   : 1945   MRN: [id number]   Date of Service:  2025  Staff Mobility Recommendation: maxi-lift    AM-PAC score:   Discharge Recommendations: 3-5  Radha Carcamo scored a 7 on the AM-PAC short mobility form. Current research shows that an AM-PAC score of 17 or less is typically not associated with a discharge to the patient's home setting. Based on the patient's AM-PAC score and their current functional mobility deficits, it is recommended that the patient have 3-5 sessions per week of Physical Therapy at d/c to increase the patient's independence.  Please see assessment section for further patient specific details.    If patient discharges prior to next session this note will serve as a discharge summary.  Please see below for the latest assessment towards goals.      Admitting Diagnosis: Sacral wound, initial encounter  Ordering Physician: Shun Harris MD   Current Admission Summary: The pt is a 78 yo female who presented to the ED on  with B leg pain and swelling; reported she is dependent at home but boyfriend no longer able to care for her. The pt found to have an infected sacral wound which has been debrided per general sx and now has a wound VAC. Nephrology is consulted due to hyponatremia; she was in a-fib with RVR; she was cardioverted on  and has been in sinus rhythm; she was found to have an an acute PE and is currently on Eliquis. Earlier in this admission PT and OT had attempted to see the pt and she adamantly refused stating she is completely dependent at home and that she did not want therapy while in the hospital; PT and OT signed off but now the pt has told the MD that she will work with therapy therefore new  strengthening, ROM, functional mobility training, endurance training, equipment evaluation/education, patient/caregiver education, and safety education    Goals  Patient Goals: to go home   Short Term Goals:  Time Frame: By acute discharge  Patient will complete bed mobility with moderate assistance of 2 person   Patient will tolerate 10 reps of BLE exercise  The pt will tolerate EOB sitting x 5 minutes with up to mod A for balance.  The pt to attempt a stand to the stedy with max A of 2    Above goals reviewed on 5/30/2025.  All goals are ongoing at this time unless indicated above.      Therapy Session Time      Individual Group Co-treatment   Time In     0951   Time Out     1034   Minutes     43   [x]co-treatment indicated; patient seen for co-treatment due to: patient safety, patient endurance, staff recommendation, limited functional status information, cognitive status, behavioral concerns, and need for the assistance of 2 skilled therapists.    PT addressing: [] Sitting balance/LE WB, [] Standing balance/LE WB, []Transfer training, [] BLE strength/endurance with functional tasks,  [] Other:  OT addressing: []ADL's, [] Pericare,  []clothing management, [] BU E strength/endurance with functional tasks,  [] UE WB [] Other:      Timed Code Treatment Minutes:  28 Minutes  Total Treatment Minutes:  43 minutes    Minutes per charge:  Moderate complexity eval: 15 minutes  Therapeutic activity: 28 minutes      Electronically signed by Gertrudis Rust, PT [id number] on 5/30/2025 at 10:49 AM

## 2025-06-01 NOTE — DISCHARGE SUMMARY
Hospital Medicine Discharge Summary    Patient ID: Radha Carcamo      Patient's PCP: Madai Morocho APRN - NP    Admit Date: 5/19/2025     Discharge Date: 5/30/2025      Admitting Physician: Karan Radford MD     Discharge Physician: Karan Radford MD     Discharge Diagnoses:       Active Hospital Problems    Diagnosis     Bacteroides infection [A49.8]     MRSA (methicillin resistant Staphylococcus aureus) infection [A49.02]     Atrial fibrillation (HCC) [I48.91]     Atrial fibrillation with RVR (HCC) [I48.91]     Infection requiring contact isolation precautions [B99.9]     MRSA infection (methicillin-resistant Staphylococcus aureus) [A49.02]     Head contusion [S00.93XA]     Paroxysmal atrial fibrillation (HCC) [I48.0]     E coli infection [A49.8]     Decubitus ulcer of sacral region, stage 3 (HCC) [L89.153]     Cellulitis of sacral region [L03.319]     Acute cervical myofascial strain [S16.1XXA]     Bilateral leg edema [R60.0]     Bilateral pulmonary embolism (HCC) [I26.99]     Compression of lumbar vertebra (HCC) [S32.000A]     Hyponatremia [E87.1]     Skin ulcer of sacrum (HCC) [L98.429]     Pressure injury of skin of sacral region [L89.159]     Sacral wound, initial encounter [S31.000A]        The patient was seen and examined on day of discharge and this discharge summary is in conjunction with any daily progress note from day of discharge.    Hospital Course:   Radha Carcamo is a 79 y.o. female with pmh of  who presents with concern that her boyfriend is no longer able to care for her she is essentially nonambulatory uses a wheelchair for mobility, now has a large sacral decubitus ulcer that extends deeply likely to the sacrum area there is a some necrotic tissue in the wound.     Admitted with generalized weakness  Difficulty with ambulation  Lower extremity swelling  Sacral decubitus ulcer with ongoing cellulitis and drainage  Hyponatremia  Lumbar spinal stenosis  Pulmonary embolism

## 2025-06-02 DIAGNOSIS — L89.153 DECUBITUS ULCER OF SACRAL REGION, STAGE 3 (HCC): Primary | ICD-10-CM

## 2025-06-02 RX ORDER — LINEZOLID 600 MG/1
600 TABLET, FILM COATED ORAL 2 TIMES DAILY
Qty: 28 TABLET | Refills: 0
Start: 2025-06-02 | End: 2025-06-16

## 2025-06-03 ENCOUNTER — TELEPHONE (OUTPATIENT)
Dept: INFECTIOUS DISEASES | Age: 80
End: 2025-06-03

## 2025-06-03 DIAGNOSIS — L89.153 DECUBITUS ULCER OF SACRAL REGION, STAGE 3 (HCC): Primary | ICD-10-CM

## 2025-06-03 NOTE — TELEPHONE ENCOUNTER
Called [facility] and spoke with . She stated the nurse wouldn't answer and took my name and number for a call back.

## 2025-06-03 NOTE — TELEPHONE ENCOUNTER
Attempted to call [place] [facility] again. No none available to verify orders. Will continue to try to call to verify orders.

## 2025-06-04 NOTE — TELEPHONE ENCOUNTER
Attempted to call [place] [facility] again. No none available to verify orders. Will continue to try to call to verify orders.

## 2025-06-05 ENCOUNTER — TELEPHONE (OUTPATIENT)
Dept: INFECTIOUS DISEASES | Age: 80
End: 2025-06-05

## 2025-06-05 NOTE — TELEPHONE ENCOUNTER
Spoke with James, nurse at facility, and verified IV abx, PO abx and weekly labs needed.  She verbalized understanding.

## 2025-06-11 ENCOUNTER — TELEPHONE (OUTPATIENT)
Dept: INFECTIOUS DISEASES | Age: 80
End: 2025-06-11

## 2025-06-13 ENCOUNTER — TELEPHONE (OUTPATIENT)
Dept: INFECTIOUS DISEASES | Age: 80
End: 2025-06-13

## 2025-06-13 NOTE — TELEPHONE ENCOUNTER
Rn at [facility] verbalized understanding to DC IV abx and pull PICC after last dose on 6/15/25.

## 2025-06-16 ENCOUNTER — TELEPHONE (OUTPATIENT)
Dept: INFECTIOUS DISEASES | Age: 80
End: 2025-06-16

## 2025-07-10 ENCOUNTER — OFFICE VISIT (OUTPATIENT)
Dept: CARDIOLOGY CLINIC | Age: 80
End: 2025-07-10
Payer: MEDICARE

## 2025-07-10 VITALS
BODY MASS INDEX: 26.05 KG/M2 | HEART RATE: 80 BPM | DIASTOLIC BLOOD PRESSURE: 70 MMHG | OXYGEN SATURATION: 97 % | SYSTOLIC BLOOD PRESSURE: 138 MMHG | HEIGHT: 63 IN | WEIGHT: 147 LBS

## 2025-07-10 DIAGNOSIS — I48.91 ATRIAL FIBRILLATION, UNSPECIFIED TYPE (HCC): Primary | ICD-10-CM

## 2025-07-10 DIAGNOSIS — M79.89 SWELLING OF LOWER EXTREMITY: ICD-10-CM

## 2025-07-10 DIAGNOSIS — I48.0 PAF (PAROXYSMAL ATRIAL FIBRILLATION) (HCC): ICD-10-CM

## 2025-07-10 PROCEDURE — 1159F MED LIST DOCD IN RCRD: CPT | Performed by: INTERNAL MEDICINE

## 2025-07-10 PROCEDURE — G2211 COMPLEX E/M VISIT ADD ON: HCPCS | Performed by: INTERNAL MEDICINE

## 2025-07-10 PROCEDURE — 99214 OFFICE O/P EST MOD 30 MIN: CPT | Performed by: INTERNAL MEDICINE

## 2025-07-10 PROCEDURE — 93000 ELECTROCARDIOGRAM COMPLETE: CPT | Performed by: INTERNAL MEDICINE

## 2025-07-10 PROCEDURE — 1123F ACP DISCUSS/DSCN MKR DOCD: CPT | Performed by: INTERNAL MEDICINE

## 2025-07-10 RX ORDER — FUROSEMIDE 20 MG/1
20 TABLET ORAL DAILY
Qty: 90 TABLET | Refills: 1
Start: 2025-07-10

## 2025-07-10 NOTE — PROGRESS NOTES
[facility]   Electrophysiology Follow up     Date: 7/10/2025  I had the privilege of visiting Radha Carcamo in the office.     CC: Follow up atrial fibrillation    HPI: Radha Carcamo is a 79 y.o. 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    Patient presents today from long-term care facility with for follow-up of atrial fibrillation.  She is in significant pain in her low sacral area and knees.  She remains nonambulatory, with a Keenan catheter, is very tearful when discussing whether this should be removed or not due to frequent urination and the risk of soiling herself.  She denies palpitations or racing heart.  She denies pain in her legs.    Review of System:  [x] Full ROS obtained and negative except as mentioned in HPI      Prior to Admission medications    Medication Sig Start Date End Date Taking? Authorizing Provider   apixaban (ELIQUIS) 5 MG TABS tablet Take 1 tablet by mouth 2 times daily 5/26/25 7/10/25 Yes Karan Radford MD   gabapentin (NEURONTIN) 100 MG capsule Take 1 capsule by mouth 3 times daily.   Yes ProviderSindy MD   ibuprofen (ADVIL;MOTRIN) 400 MG tablet Take 1-2 tablets by mouth every 6 hours as needed for Pain or Fever   Yes ProviderSindy MD   metoprolol succinate (TOPROL XL) 50 MG extended release tablet Take 1 tablet by mouth daily 5/27/25   Karan Radford MD   amiodarone (PACERONE) 400 MG tablet Take 1 tablet by mouth 2 times daily for 8 days 5/26/25 6/3/25  Karan Radford MD   amiodarone (PACERONE) 100 MG

## 2025-07-11 ENCOUNTER — TELEPHONE (OUTPATIENT)
Dept: CARDIOLOGY CLINIC | Age: 80
End: 2025-07-11

## 2025-07-11 RX ORDER — OXYCODONE AND ACETAMINOPHEN 5; 325 MG/1; MG/1
1 TABLET ORAL EVERY 6 HOURS PRN
COMMUNITY

## 2025-07-11 RX ORDER — BISACODYL 10 MG
10 SUPPOSITORY, RECTAL RECTAL DAILY
Qty: 30 SUPPOSITORY | Refills: 0 | Status: CANCELLED | OUTPATIENT
Start: 2025-07-11 | End: 2025-08-10

## 2025-07-11 RX ORDER — CALCIUM CARBONATE 500(1250)
500 TABLET ORAL DAILY
COMMUNITY

## 2025-07-11 RX ORDER — SODIUM PHOSPHATE, DIBASIC AND SODIUM PHOSPHATE, MONOBASIC 7; 19 G/230ML; G/230ML
1 ENEMA RECTAL
Status: CANCELLED | COMMUNITY
Start: 2025-07-11

## 2025-07-11 RX ORDER — IBUPROFEN 400 MG/1
400 TABLET, FILM COATED ORAL EVERY 6 HOURS PRN
Qty: 120 TABLET | Refills: 3 | Status: CANCELLED | OUTPATIENT
Start: 2025-07-11

## 2025-07-11 RX ORDER — FERROUS SULFATE 325(65) MG
325 TABLET ORAL
Qty: 30 TABLET | Refills: 5 | Status: CANCELLED | OUTPATIENT
Start: 2025-07-11

## 2025-07-11 RX ORDER — SODIUM PHOSPHATE, DIBASIC AND SODIUM PHOSPHATE, MONOBASIC 7; 19 G/230ML; G/230ML
1 ENEMA RECTAL DAILY
COMMUNITY

## 2025-07-11 RX ORDER — AMINO ACIDS 700 MG
TABLET ORAL
Status: CANCELLED | OUTPATIENT
Start: 2025-07-11

## 2025-07-11 RX ORDER — ERGOCALCIFEROL 1.25 MG/1
50000 CAPSULE, LIQUID FILLED ORAL WEEKLY
Qty: 12 CAPSULE | Refills: 1 | Status: CANCELLED | OUTPATIENT
Start: 2025-07-11

## 2025-07-11 RX ORDER — SODIUM CHLORIDE 1 G/1
1 TABLET ORAL 3 TIMES DAILY
Qty: 90 TABLET | Refills: 3 | Status: CANCELLED | OUTPATIENT
Start: 2025-07-11

## 2025-07-11 RX ORDER — OXYCODONE AND ACETAMINOPHEN 5; 325 MG/1; MG/1
1 TABLET ORAL EVERY 6 HOURS PRN
Qty: 28 TABLET | Refills: 0 | Status: CANCELLED | OUTPATIENT
Start: 2025-07-11 | End: 2025-07-18

## 2025-07-11 RX ORDER — FERROUS SULFATE 325(65) MG
325 TABLET ORAL
COMMUNITY

## 2025-07-11 RX ORDER — ERGOCALCIFEROL 1.25 MG/1
50000 CAPSULE, LIQUID FILLED ORAL WEEKLY
COMMUNITY

## 2025-07-11 RX ORDER — SODIUM CHLORIDE 1 G/1
1 TABLET ORAL 2 TIMES DAILY
COMMUNITY

## 2025-07-11 RX ORDER — BISACODYL 10 MG
10 SUPPOSITORY, RECTAL RECTAL DAILY
COMMUNITY

## 2025-07-11 RX ORDER — LOPERAMIDE HYDROCHLORIDE 2 MG/1
2 CAPSULE ORAL PRN
Status: CANCELLED | OUTPATIENT
Start: 2025-07-11 | End: 2025-07-21

## 2025-07-11 RX ORDER — LOPERAMIDE HYDROCHLORIDE 2 MG/1
2 CAPSULE ORAL PRN
COMMUNITY

## 2025-07-11 NOTE — TELEPHONE ENCOUNTER
PT had an O/V yesterday, but did not know what medications she was taking. Received updated medication list from nursing [place]. Scanned documents to PT's chart and updated medication list accordingly.

## 2025-07-25 ENCOUNTER — APPOINTMENT (OUTPATIENT)
Dept: CT IMAGING | Age: 80
End: 2025-07-25
Payer: MEDICARE

## 2025-07-25 ENCOUNTER — HOSPITAL ENCOUNTER (INPATIENT)
Age: 80
LOS: 7 days | Discharge: NURSING, REHAB OR LONG-TERM CARE | End: 2025-08-01
Attending: EMERGENCY MEDICINE
Payer: MEDICARE

## 2025-07-25 ENCOUNTER — APPOINTMENT (OUTPATIENT)
Dept: GENERAL RADIOLOGY | Age: 80
End: 2025-07-25
Payer: MEDICARE

## 2025-07-25 DIAGNOSIS — E87.1 HYPONATREMIA: ICD-10-CM

## 2025-07-25 DIAGNOSIS — R10.9 ACUTE ABDOMINAL PAIN: ICD-10-CM

## 2025-07-25 DIAGNOSIS — K62.5 RECTAL BLEEDING: Primary | ICD-10-CM

## 2025-07-25 DIAGNOSIS — L89.159 PRESSURE INJURY OF SKIN OF SACRAL REGION, UNSPECIFIED INJURY STAGE: ICD-10-CM

## 2025-07-25 DIAGNOSIS — D50.9 IRON DEFICIENCY ANEMIA, UNSPECIFIED IRON DEFICIENCY ANEMIA TYPE: ICD-10-CM

## 2025-07-25 PROBLEM — K92.2 ACUTE GI BLEEDING: Status: ACTIVE | Noted: 2025-07-25

## 2025-07-25 LAB
ABO/RH: NORMAL
ANION GAP SERPL CALCULATED.3IONS-SCNC: 11 MMOL/L (ref 3–16)
ANION GAP SERPL CALCULATED.3IONS-SCNC: 11 MMOL/L (ref 3–16)
ANTIBODY SCREEN: NORMAL
BASOPHILS # BLD: 0.1 K/UL (ref 0–0.2)
BASOPHILS NFR BLD: 0.8 %
BUN SERPL-MCNC: 18 MG/DL (ref 7–20)
BUN SERPL-MCNC: 21 MG/DL (ref 7–20)
CALCIUM SERPL-MCNC: 9.1 MG/DL (ref 8.3–10.6)
CALCIUM SERPL-MCNC: 9.3 MG/DL (ref 8.3–10.6)
CHLORIDE SERPL-SCNC: 86 MMOL/L (ref 99–110)
CHLORIDE SERPL-SCNC: 87 MMOL/L (ref 99–110)
CO2 SERPL-SCNC: 23 MMOL/L (ref 21–32)
CO2 SERPL-SCNC: 24 MMOL/L (ref 21–32)
CREAT SERPL-MCNC: 0.3 MG/DL (ref 0.6–1.2)
CREAT SERPL-MCNC: <0.2 MG/DL (ref 0.6–1.2)
DEPRECATED RDW RBC AUTO: 16.1 % (ref 12.4–15.4)
EOSINOPHIL # BLD: 0.1 K/UL (ref 0–0.6)
EOSINOPHIL NFR BLD: 0.8 %
GFR SERPLBLD CREATININE-BSD FMLA CKD-EPI: >90 ML/MIN/{1.73_M2}
GFR SERPLBLD CREATININE-BSD FMLA CKD-EPI: >90 ML/MIN/{1.73_M2}
GLUCOSE SERPL-MCNC: 101 MG/DL (ref 70–99)
GLUCOSE SERPL-MCNC: 102 MG/DL (ref 70–99)
HCT VFR BLD AUTO: 26.5 % (ref 36–48)
HCT VFR BLD AUTO: 27.8 % (ref 36–48)
HGB BLD-MCNC: 9.2 G/DL (ref 12–16)
HGB BLD-MCNC: 9.3 G/DL (ref 12–16)
INR PPP: 1.26 (ref 0.86–1.14)
LACTATE BLDV-SCNC: 0.7 MMOL/L (ref 0.4–1.9)
LIPASE SERPL-CCNC: 30 U/L (ref 13–60)
LYMPHOCYTES # BLD: 1.8 K/UL (ref 1–5.1)
LYMPHOCYTES NFR BLD: 21 %
MCH RBC QN AUTO: 30.9 PG (ref 26–34)
MCHC RBC AUTO-ENTMCNC: 33.6 G/DL (ref 31–36)
MCV RBC AUTO: 92 FL (ref 80–100)
MONOCYTES # BLD: 0.7 K/UL (ref 0–1.3)
MONOCYTES NFR BLD: 7.9 %
NEUTROPHILS # BLD: 5.8 K/UL (ref 1.7–7.7)
NEUTROPHILS NFR BLD: 69.5 %
NT-PROBNP SERPL-MCNC: 753 PG/ML (ref 0–449)
PLATELET # BLD AUTO: 589 K/UL (ref 135–450)
PMV BLD AUTO: 6.9 FL (ref 5–10.5)
POTASSIUM SERPL-SCNC: 4.4 MMOL/L (ref 3.5–5.1)
POTASSIUM SERPL-SCNC: 4.8 MMOL/L (ref 3.5–5.1)
PROTHROMBIN TIME: 16 SEC (ref 12.1–14.9)
RBC # BLD AUTO: 3.02 M/UL (ref 4–5.2)
SODIUM SERPL-SCNC: 121 MMOL/L (ref 136–145)
SODIUM SERPL-SCNC: 121 MMOL/L (ref 136–145)
TROPONIN, HIGH SENSITIVITY: 21 NG/L (ref 0–14)
TROPONIN, HIGH SENSITIVITY: 27 NG/L (ref 0–14)
WBC # BLD AUTO: 8.4 K/UL (ref 4–11)

## 2025-07-25 PROCEDURE — 2580000003 HC RX 258: Performed by: EMERGENCY MEDICINE

## 2025-07-25 PROCEDURE — 86850 RBC ANTIBODY SCREEN: CPT

## 2025-07-25 PROCEDURE — 71045 X-RAY EXAM CHEST 1 VIEW: CPT

## 2025-07-25 PROCEDURE — 2580000003 HC RX 258

## 2025-07-25 PROCEDURE — 84484 ASSAY OF TROPONIN QUANT: CPT

## 2025-07-25 PROCEDURE — 83690 ASSAY OF LIPASE: CPT

## 2025-07-25 PROCEDURE — 85014 HEMATOCRIT: CPT

## 2025-07-25 PROCEDURE — 99285 EMERGENCY DEPT VISIT HI MDM: CPT

## 2025-07-25 PROCEDURE — 74174 CTA ABD&PLVS W/CONTRAST: CPT

## 2025-07-25 PROCEDURE — 2500000003 HC RX 250 WO HCPCS: Performed by: EMERGENCY MEDICINE

## 2025-07-25 PROCEDURE — 86900 BLOOD TYPING SEROLOGIC ABO: CPT

## 2025-07-25 PROCEDURE — 85018 HEMOGLOBIN: CPT

## 2025-07-25 PROCEDURE — 80048 BASIC METABOLIC PNL TOTAL CA: CPT

## 2025-07-25 PROCEDURE — 6370000000 HC RX 637 (ALT 250 FOR IP)

## 2025-07-25 PROCEDURE — 85025 COMPLETE CBC W/AUTO DIFF WBC: CPT

## 2025-07-25 PROCEDURE — 93005 ELECTROCARDIOGRAM TRACING: CPT | Performed by: EMERGENCY MEDICINE

## 2025-07-25 PROCEDURE — 6360000002 HC RX W HCPCS: Performed by: EMERGENCY MEDICINE

## 2025-07-25 PROCEDURE — 6360000004 HC RX CONTRAST MEDICATION: Performed by: EMERGENCY MEDICINE

## 2025-07-25 PROCEDURE — 36415 COLL VENOUS BLD VENIPUNCTURE: CPT

## 2025-07-25 PROCEDURE — 83605 ASSAY OF LACTIC ACID: CPT

## 2025-07-25 PROCEDURE — 83880 ASSAY OF NATRIURETIC PEPTIDE: CPT

## 2025-07-25 PROCEDURE — 6360000002 HC RX W HCPCS

## 2025-07-25 PROCEDURE — 6370000000 HC RX 637 (ALT 250 FOR IP): Performed by: EMERGENCY MEDICINE

## 2025-07-25 PROCEDURE — 96374 THER/PROPH/DIAG INJ IV PUSH: CPT

## 2025-07-25 PROCEDURE — 86901 BLOOD TYPING SEROLOGIC RH(D): CPT

## 2025-07-25 PROCEDURE — 85610 PROTHROMBIN TIME: CPT

## 2025-07-25 PROCEDURE — 1200000000 HC SEMI PRIVATE

## 2025-07-25 RX ORDER — FERROUS SULFATE 325(65) MG
325 TABLET ORAL
Status: DISCONTINUED | OUTPATIENT
Start: 2025-07-26 | End: 2025-07-26

## 2025-07-25 RX ORDER — SODIUM CHLORIDE 9 MG/ML
INJECTION, SOLUTION INTRAVENOUS CONTINUOUS
Status: DISCONTINUED | OUTPATIENT
Start: 2025-07-25 | End: 2025-07-25

## 2025-07-25 RX ORDER — LACTULOSE 10 G/15ML
20 SOLUTION ORAL ONCE
Status: DISCONTINUED | OUTPATIENT
Start: 2025-07-25 | End: 2025-08-01 | Stop reason: HOSPADM

## 2025-07-25 RX ORDER — AMIODARONE HYDROCHLORIDE 200 MG/1
200 TABLET ORAL DAILY
Status: DISCONTINUED | OUTPATIENT
Start: 2025-07-26 | End: 2025-08-01 | Stop reason: HOSPADM

## 2025-07-25 RX ORDER — ACETAMINOPHEN 650 MG/1
650 SUPPOSITORY RECTAL EVERY 6 HOURS PRN
Status: DISCONTINUED | OUTPATIENT
Start: 2025-07-25 | End: 2025-08-01 | Stop reason: HOSPADM

## 2025-07-25 RX ORDER — SODIUM CHLORIDE 0.9 % (FLUSH) 0.9 %
5-40 SYRINGE (ML) INJECTION PRN
Status: DISCONTINUED | OUTPATIENT
Start: 2025-07-25 | End: 2025-08-01 | Stop reason: HOSPADM

## 2025-07-25 RX ORDER — IOPAMIDOL 755 MG/ML
75 INJECTION, SOLUTION INTRAVASCULAR
Status: COMPLETED | OUTPATIENT
Start: 2025-07-25 | End: 2025-07-25

## 2025-07-25 RX ORDER — POLYETHYLENE GLYCOL 3350 17 G/17G
17 POWDER, FOR SOLUTION ORAL DAILY PRN
Status: DISCONTINUED | OUTPATIENT
Start: 2025-07-25 | End: 2025-07-26

## 2025-07-25 RX ORDER — POTASSIUM CHLORIDE 7.45 MG/ML
10 INJECTION INTRAVENOUS PRN
Status: DISCONTINUED | OUTPATIENT
Start: 2025-07-25 | End: 2025-08-01 | Stop reason: HOSPADM

## 2025-07-25 RX ORDER — SODIUM CHLORIDE 1 G/1
1 TABLET ORAL 2 TIMES DAILY
Status: DISCONTINUED | OUTPATIENT
Start: 2025-07-26 | End: 2025-07-26

## 2025-07-25 RX ORDER — BISACODYL 10 MG
10 SUPPOSITORY, RECTAL RECTAL DAILY PRN
Status: DISCONTINUED | OUTPATIENT
Start: 2025-07-26 | End: 2025-08-01 | Stop reason: HOSPADM

## 2025-07-25 RX ORDER — ONDANSETRON 2 MG/ML
4 INJECTION INTRAMUSCULAR; INTRAVENOUS EVERY 6 HOURS PRN
Status: DISCONTINUED | OUTPATIENT
Start: 2025-07-25 | End: 2025-08-01 | Stop reason: HOSPADM

## 2025-07-25 RX ORDER — ACETAMINOPHEN 325 MG/1
650 TABLET ORAL EVERY 6 HOURS PRN
Status: DISCONTINUED | OUTPATIENT
Start: 2025-07-25 | End: 2025-08-01 | Stop reason: HOSPADM

## 2025-07-25 RX ORDER — SODIUM CHLORIDE 0.9 % (FLUSH) 0.9 %
5-40 SYRINGE (ML) INJECTION EVERY 12 HOURS SCHEDULED
Status: DISCONTINUED | OUTPATIENT
Start: 2025-07-25 | End: 2025-08-01 | Stop reason: HOSPADM

## 2025-07-25 RX ORDER — GABAPENTIN 100 MG/1
100 CAPSULE ORAL 3 TIMES DAILY
Status: DISCONTINUED | OUTPATIENT
Start: 2025-07-25 | End: 2025-08-01 | Stop reason: HOSPADM

## 2025-07-25 RX ORDER — SODIUM CHLORIDE 9 MG/ML
INJECTION, SOLUTION INTRAVENOUS PRN
Status: DISCONTINUED | OUTPATIENT
Start: 2025-07-25 | End: 2025-08-01 | Stop reason: HOSPADM

## 2025-07-25 RX ORDER — SODIUM CHLORIDE 9 MG/ML
INJECTION, SOLUTION INTRAVENOUS CONTINUOUS
Status: DISCONTINUED | OUTPATIENT
Start: 2025-07-25 | End: 2025-07-26

## 2025-07-25 RX ORDER — ONDANSETRON 4 MG/1
4 TABLET, ORALLY DISINTEGRATING ORAL EVERY 8 HOURS PRN
Status: DISCONTINUED | OUTPATIENT
Start: 2025-07-25 | End: 2025-08-01 | Stop reason: HOSPADM

## 2025-07-25 RX ORDER — FUROSEMIDE 20 MG/1
20 TABLET ORAL DAILY
Status: DISCONTINUED | OUTPATIENT
Start: 2025-07-25 | End: 2025-08-01 | Stop reason: HOSPADM

## 2025-07-25 RX ORDER — MORPHINE SULFATE 2 MG/ML
2 INJECTION, SOLUTION INTRAMUSCULAR; INTRAVENOUS EVERY 4 HOURS PRN
Refills: 0 | Status: DISCONTINUED | OUTPATIENT
Start: 2025-07-25 | End: 2025-08-01 | Stop reason: HOSPADM

## 2025-07-25 RX ORDER — OXYCODONE AND ACETAMINOPHEN 5; 325 MG/1; MG/1
1 TABLET ORAL ONCE
Refills: 0 | Status: COMPLETED | OUTPATIENT
Start: 2025-07-25 | End: 2025-07-25

## 2025-07-25 RX ORDER — OXYCODONE AND ACETAMINOPHEN 5; 325 MG/1; MG/1
1 TABLET ORAL EVERY 6 HOURS PRN
Refills: 0 | Status: DISCONTINUED | OUTPATIENT
Start: 2025-07-25 | End: 2025-08-01 | Stop reason: HOSPADM

## 2025-07-25 RX ORDER — MAGNESIUM SULFATE IN WATER 40 MG/ML
2000 INJECTION, SOLUTION INTRAVENOUS PRN
Status: DISCONTINUED | OUTPATIENT
Start: 2025-07-25 | End: 2025-08-01 | Stop reason: HOSPADM

## 2025-07-25 RX ORDER — POTASSIUM CHLORIDE 1500 MG/1
40 TABLET, EXTENDED RELEASE ORAL PRN
Status: DISCONTINUED | OUTPATIENT
Start: 2025-07-25 | End: 2025-08-01 | Stop reason: HOSPADM

## 2025-07-25 RX ADMIN — PANTOPRAZOLE SODIUM 40 MG: 40 INJECTION, POWDER, FOR SOLUTION INTRAVENOUS at 16:34

## 2025-07-25 RX ADMIN — SODIUM CHLORIDE: 0.9 INJECTION, SOLUTION INTRAVENOUS at 20:41

## 2025-07-25 RX ADMIN — MORPHINE SULFATE 2 MG: 2 INJECTION, SOLUTION INTRAMUSCULAR; INTRAVENOUS at 20:43

## 2025-07-25 RX ADMIN — ONDANSETRON 4 MG: 2 INJECTION, SOLUTION INTRAMUSCULAR; INTRAVENOUS at 20:43

## 2025-07-25 RX ADMIN — SODIUM CHLORIDE: 0.9 INJECTION, SOLUTION INTRAVENOUS at 23:08

## 2025-07-25 RX ADMIN — OXYCODONE AND ACETAMINOPHEN 1 TABLET: 5; 325 TABLET ORAL at 17:26

## 2025-07-25 RX ADMIN — Medication 3 MG: at 23:11

## 2025-07-25 RX ADMIN — IOPAMIDOL 75 ML: 755 INJECTION, SOLUTION INTRAVENOUS at 16:53

## 2025-07-25 RX ADMIN — OXYCODONE AND ACETAMINOPHEN 1 TABLET: 5; 325 TABLET ORAL at 23:09

## 2025-07-25 ASSESSMENT — PAIN DESCRIPTION - LOCATION
LOCATION: GENERALIZED;BACK
LOCATION: BACK;GENERALIZED
LOCATION: KNEE
LOCATION: KNEE

## 2025-07-25 ASSESSMENT — PAIN DESCRIPTION - ORIENTATION
ORIENTATION: RIGHT;LEFT
ORIENTATION: LEFT;RIGHT
ORIENTATION: RIGHT;LEFT
ORIENTATION: LEFT;RIGHT

## 2025-07-25 ASSESSMENT — PAIN DESCRIPTION - DESCRIPTORS
DESCRIPTORS: ACHING;DISCOMFORT
DESCRIPTORS: ACHING;DISCOMFORT

## 2025-07-25 ASSESSMENT — PAIN SCALES - WONG BAKER: WONGBAKER_NUMERICALRESPONSE: HURTS A LITTLE BIT

## 2025-07-25 ASSESSMENT — PAIN SCALES - GENERAL
PAINLEVEL_OUTOF10: 10
PAINLEVEL_OUTOF10: 8
PAINLEVEL_OUTOF10: 10
PAINLEVEL_OUTOF10: 10
PAINLEVEL_OUTOF10: 2
PAINLEVEL_OUTOF10: 10

## 2025-07-25 ASSESSMENT — LIFESTYLE VARIABLES
HOW OFTEN DO YOU HAVE A DRINK CONTAINING ALCOHOL: NEVER
HOW MANY STANDARD DRINKS CONTAINING ALCOHOL DO YOU HAVE ON A TYPICAL DAY: PATIENT DOES NOT DRINK

## 2025-07-25 ASSESSMENT — PAIN - FUNCTIONAL ASSESSMENT: PAIN_FUNCTIONAL_ASSESSMENT: 0-10

## 2025-07-25 NOTE — ED PROVIDER NOTES
[facility] EMERGENCY DEPARTMENT  EMERGENCY DEPARTMENT ENCOUNTER      Pt Name: Radha Carcamo  MRN: [id number]  Birthdate 1945  Date of evaluation: 7/25/2025  Provider: MAREK ARREGUIN DO    CHIEF COMPLAINT       Chief Complaint   Patient presents with    Rectal Bleeding     Pt came from a nursing facility who reported pt has bright red liquid stool that started two days ago. Pt reports 10/10 bilateral knee pain.Notable bilateral lower extremity edema.          HISTORY OF PRESENT ILLNESS   (Location/Symptom, Timing/Onset, Context/Setting, Quality, Duration, Modifying Factors, Severity)  Note limiting factors.   Radha Carcamo is a 79 y.o. female who presents to the emergency department with a report of bright red liquid stool that started 2 days ago at the extended-care facility where she lives.    She reports that there was \"blood in my diaper\".  She admits to some recent mid abdominal discomfort.  Pain began earlier today.  She denies any back or flank pain.  She denies any chest pain or shortness of breath.  She denies any fever or chills.  No nausea or vomiting.    She denies any recent fall trauma or injury.  She denies any headache earache sore throat or sinus drainage.  No cough or cold symptoms.  She denies any dysuria hematuria frequency or urgency.    She does have chronic lower extremity edema and is nonambulatory.  Edema is unchanged from baseline according to the patient.    The patient was recently admitted last month with sacral decubitus ulcer.  She underwent excisional debridement of the tissue on May 20 20-5.  She was started on broad-spectrum antibiotics.  She was ultimately discharged to an extended care facility.  She was discharged with a wound VAC.  She has a remote history of pulmonary embolus.  Recent venous Dopplers revealed no evidence of DVT.    Medical history is significant for spinal stenosis, tobacco use, bilateral pulmonary embolus, sacral decubitus ulcer,

## 2025-07-25 NOTE — H&P
V2.0  History and Physical      Name:  Radha Carcamo /Age/Sex: 1945  (79 y.o. female)   MRN & CSN:  [id number] & [id number] Encounter Date/Time: 2025 7:27 PM EDT   Location:  [address] [place] [address] PCP: Madai Morocho APRN - NP       Hospital Day: 1    Assessment and Plan:   Radha Carcamo is a 79 y.o. female with a pmh of chronic hyponatremia baseline 130, atrial fibrillation on Eliquis, chronic sacral decubitus ulcer, history of MRSA, history of PE, lumbar spinal stenosis who presents with Hyponatremia    Hospital Problems           Last Modified POA    * (Principal) Hyponatremia 2025 Yes    Acute GI bleeding 2025 Yes       Plan:  # Hyponatremia  Baseline sodium 130 patient on salt tablets.  - Currently sodium 121, blood sugar 101  - In the ED patient received 1 L fluid.  Will continue normal saline at the rate 100 mL/h  - BMP every 4 hours  - Goal correction 4-6 meq in 24 hrs  - Strict I's and O's  - Will consult nephrology for further recommendation  - Will continue home SaltTab and urena 15g daily  - Patient on Lasix will hold due to hyponatremia    #Acute GI bleeding  #Bright red blood per rectum  - Hemoglobin 9.3, hematocrit 27.8 baseline hemoglobin 9.2-9.5 appears stable  - PT 16, INR 1.26  - CTA of abdominal pelvic with contrast no CT findings of acute GI hemorrhage.  Very large stool burden throughout the colon and rectum may relate to stercoral proctitis.  - Will hold Eliquis  - Will consult to GI specialist  - H&H every 4 hour    #Constipation  - CT of abdominal pelvic with contrast, Very large stool burden throughout the colon and rectum may relate to stercoral proctitis.  - Will provide 1 dose of lactulose and continue home regimen    #Sacral wound currently not infected  #Chronic back pain secondary to above  - Will consult wound care and continue home medication for pain    #Paroxysmal atrial fibrillation   #Bilateral lower extremity swelling  -Stable currently will hold Lasix due to    Recent Labs     07/25/25  1600   *   K 4.8   CL 86*   CO2 24   BUN 21*   CREATININE 0.3*   GLUCOSE 101*     Hepatic: No results for input(s): \"AST\", \"ALT\", \"BILITOT\", \"ALKPHOS\" in the last 72 hours.    Invalid input(s): \"ALB\"  Lipids:   Lab Results   Component Value Date/Time    CHOL 200 06/22/2017 05:00 PM    HDL 51 06/22/2017 05:00 PM    TRIG 135 06/22/2017 05:00 PM     Hemoglobin A1C:   Lab Results   Component Value Date/Time    LABA1C 5.5 06/22/2017 05:00 PM     TSH:   Lab Results   Component Value Date/Time    TSH 3.42 05/22/2025 06:35 AM     Troponin: No results found for: \"TROPONINT\"  Lactic Acid: No results for input(s): \"LACTA\" in the last 72 hours.  BNP:   Recent Labs     07/25/25  1600   PROBNP 753*     UA:  Lab Results   Component Value Date/Time    NITRU Negative 05/19/2025 12:22 PM    COLORU Yellow 05/19/2025 12:22 PM    PHUR 6.0 05/19/2025 12:22 PM    PHUR 6.5 06/22/2017 05:00 PM    WBCUA None seen 06/13/2014 11:56 AM    RBCUA 0-2 06/13/2014 11:56 AM    CLARITYU Clear 05/19/2025 12:22 PM    SPECGRAV  06/25/2014 09:11 PM      Comment:      ? no color match    LEUKOCYTESUR Negative 05/19/2025 12:22 PM    UROBILINOGEN 1.0 05/19/2025 12:22 PM    BILIRUBINUR Negative 05/19/2025 12:22 PM    BLOODU Negative 05/19/2025 12:22 PM    GLUCOSEU Negative 05/19/2025 12:22 PM    KETUA Negative 05/19/2025 12:22 PM     Urine Cultures: No results found for: \"LABURIN\"  Blood Cultures:   Lab Results   Component Value Date/Time    BC No Growth after 4 days of incubation. 05/19/2025 11:35 AM     Lab Results   Component Value Date/Time    BLOODCULT2 No Growth after 4 days of incubation. 05/19/2025 03:43 PM     Organism:   Lab Results   Component Value Date/Time    ORG Escherichia coli 05/20/2025 04:31 PM    ORG Bacteroides vulgatus 05/20/2025 04:31 PM    ORG Bacteroides pyogenes 05/20/2025 04:31 PM       Imaging/Diagnostics Last 24 Hours   CTA ABDOMEN PELVIS W CONTRAST  Result Date: 7/25/2025  EXAMINATION: CTA OF THE

## 2025-07-25 NOTE — ED TRIAGE NOTES
Pt came from a nursing facility who reported pt has bright red liquid stool that started two days ago. Pt reports 10/10 bilateral knee pain.Notable bilateral lower extremity edema.

## 2025-07-26 LAB
ANION GAP SERPL CALCULATED.3IONS-SCNC: 10 MMOL/L (ref 3–16)
ANION GAP SERPL CALCULATED.3IONS-SCNC: 9 MMOL/L (ref 3–16)
ANION GAP SERPL CALCULATED.3IONS-SCNC: 9 MMOL/L (ref 3–16)
BACTERIA URNS QL MICRO: ABNORMAL /HPF
BASOPHILS # BLD: 0 K/UL (ref 0–0.2)
BASOPHILS NFR BLD: 0.4 %
BILIRUB UR QL STRIP.AUTO: NEGATIVE
BUN SERPL-MCNC: 17 MG/DL (ref 7–20)
BUN SERPL-MCNC: 17 MG/DL (ref 7–20)
BUN SERPL-MCNC: 19 MG/DL (ref 7–20)
BUN SERPL-MCNC: 24 MG/DL (ref 7–20)
BUN SERPL-MCNC: 27 MG/DL (ref 7–20)
CALCIUM SERPL-MCNC: 8.8 MG/DL (ref 8.3–10.6)
CALCIUM SERPL-MCNC: 8.8 MG/DL (ref 8.3–10.6)
CALCIUM SERPL-MCNC: 8.9 MG/DL (ref 8.3–10.6)
CALCIUM SERPL-MCNC: 8.9 MG/DL (ref 8.3–10.6)
CALCIUM SERPL-MCNC: 9.1 MG/DL (ref 8.3–10.6)
CHLORIDE SERPL-SCNC: 87 MMOL/L (ref 99–110)
CHLORIDE SERPL-SCNC: 88 MMOL/L (ref 99–110)
CHLORIDE SERPL-SCNC: 88 MMOL/L (ref 99–110)
CHLORIDE SERPL-SCNC: 89 MMOL/L (ref 99–110)
CHLORIDE SERPL-SCNC: 91 MMOL/L (ref 99–110)
CLARITY UR: ABNORMAL
CO2 SERPL-SCNC: 22 MMOL/L (ref 21–32)
CO2 SERPL-SCNC: 23 MMOL/L (ref 21–32)
CO2 SERPL-SCNC: 24 MMOL/L (ref 21–32)
CO2 SERPL-SCNC: 24 MMOL/L (ref 21–32)
CO2 SERPL-SCNC: 25 MMOL/L (ref 21–32)
COLOR UR: YELLOW
CREAT SERPL-MCNC: 0.3 MG/DL (ref 0.6–1.2)
CREAT SERPL-MCNC: 0.3 MG/DL (ref 0.6–1.2)
CREAT SERPL-MCNC: <0.2 MG/DL (ref 0.6–1.2)
DEPRECATED RDW RBC AUTO: 16.1 % (ref 12.4–15.4)
EKG DIAGNOSIS: NORMAL
EKG Q-T INTERVAL: 432 MS
EKG QRS DURATION: 82 MS
EKG QTC CALCULATION (BAZETT): 466 MS
EKG R AXIS: 20 DEGREES
EKG T AXIS: 49 DEGREES
EKG VENTRICULAR RATE: 70 BPM
EOSINOPHIL # BLD: 0.1 K/UL (ref 0–0.6)
EOSINOPHIL NFR BLD: 1.1 %
EPI CELLS #/AREA URNS AUTO: 0 /HPF (ref 0–5)
FERRITIN SERPL IA-MCNC: 85 NG/ML (ref 15–150)
FOLATE SERPL-MCNC: 19.5 NG/ML (ref 4.78–24.2)
GFR SERPLBLD CREATININE-BSD FMLA CKD-EPI: >90 ML/MIN/{1.73_M2}
GLUCOSE SERPL-MCNC: 103 MG/DL (ref 70–99)
GLUCOSE SERPL-MCNC: 112 MG/DL (ref 70–99)
GLUCOSE SERPL-MCNC: 143 MG/DL (ref 70–99)
GLUCOSE SERPL-MCNC: 148 MG/DL (ref 70–99)
GLUCOSE SERPL-MCNC: 94 MG/DL (ref 70–99)
GLUCOSE UR STRIP.AUTO-MCNC: NEGATIVE MG/DL
HCT VFR BLD AUTO: 24.1 % (ref 36–48)
HCT VFR BLD AUTO: 24.6 % (ref 36–48)
HCT VFR BLD AUTO: 25.7 % (ref 36–48)
HCT VFR BLD AUTO: 25.7 % (ref 36–48)
HCT VFR BLD AUTO: 26.3 % (ref 36–48)
HGB BLD-MCNC: 8.2 G/DL (ref 12–16)
HGB BLD-MCNC: 8.3 G/DL (ref 12–16)
HGB BLD-MCNC: 8.7 G/DL (ref 12–16)
HGB BLD-MCNC: 8.9 G/DL (ref 12–16)
HGB BLD-MCNC: 8.9 G/DL (ref 12–16)
HGB UR QL STRIP.AUTO: ABNORMAL
HYALINE CASTS #/AREA URNS AUTO: 1 /LPF (ref 0–8)
IRON SATN MFR SERPL: 8 % (ref 15–50)
IRON SERPL-MCNC: 21 UG/DL (ref 37–145)
KETONES UR STRIP.AUTO-MCNC: NEGATIVE MG/DL
LEUKOCYTE ESTERASE UR QL STRIP.AUTO: ABNORMAL
LYMPHOCYTES # BLD: 1.1 K/UL (ref 1–5.1)
LYMPHOCYTES NFR BLD: 17.8 %
MCH RBC QN AUTO: 31.3 PG (ref 26–34)
MCHC RBC AUTO-ENTMCNC: 34.7 G/DL (ref 31–36)
MCV RBC AUTO: 90.4 FL (ref 80–100)
MONOCYTES # BLD: 0.5 K/UL (ref 0–1.3)
MONOCYTES NFR BLD: 7.9 %
NEUTROPHILS # BLD: 4.4 K/UL (ref 1.7–7.7)
NEUTROPHILS NFR BLD: 72.8 %
NITRITE UR QL STRIP.AUTO: POSITIVE
OSMOLALITY UR: 587 MOSM/KG (ref 390–1070)
PH UR STRIP.AUTO: 6.5 [PH] (ref 5–8)
PLATELET # BLD AUTO: 493 K/UL (ref 135–450)
PMV BLD AUTO: 6.8 FL (ref 5–10.5)
POTASSIUM SERPL-SCNC: 4.2 MMOL/L (ref 3.5–5.1)
POTASSIUM SERPL-SCNC: 4.4 MMOL/L (ref 3.5–5.1)
POTASSIUM SERPL-SCNC: 4.5 MMOL/L (ref 3.5–5.1)
PROT UR STRIP.AUTO-MCNC: NEGATIVE MG/DL
RBC # BLD AUTO: 2.84 M/UL (ref 4–5.2)
RBC CLUMPS #/AREA URNS AUTO: 7 /HPF (ref 0–4)
SODIUM SERPL-SCNC: 120 MMOL/L (ref 136–145)
SODIUM SERPL-SCNC: 122 MMOL/L (ref 136–145)
SODIUM SERPL-SCNC: 123 MMOL/L (ref 136–145)
SP GR UR STRIP.AUTO: 1.03 (ref 1–1.03)
TIBC SERPL-MCNC: 253 UG/DL (ref 260–445)
UA COMPLETE W REFLEX CULTURE PNL UR: YES
UA DIPSTICK W REFLEX MICRO PNL UR: YES
URN SPEC COLLECT METH UR: ABNORMAL
UROBILINOGEN UR STRIP-ACNC: 0.2 E.U./DL
VIT B12 SERPL-MCNC: 651 PG/ML (ref 211–911)
WBC # BLD AUTO: 6.1 K/UL (ref 4–11)
WBC #/AREA URNS AUTO: 239 /HPF (ref 0–5)

## 2025-07-26 PROCEDURE — 85025 COMPLETE CBC W/AUTO DIFF WBC: CPT

## 2025-07-26 PROCEDURE — 83550 IRON BINDING TEST: CPT

## 2025-07-26 PROCEDURE — 85018 HEMOGLOBIN: CPT

## 2025-07-26 PROCEDURE — 87086 URINE CULTURE/COLONY COUNT: CPT

## 2025-07-26 PROCEDURE — 6360000002 HC RX W HCPCS: Performed by: INTERNAL MEDICINE

## 2025-07-26 PROCEDURE — 87077 CULTURE AEROBIC IDENTIFY: CPT

## 2025-07-26 PROCEDURE — 36415 COLL VENOUS BLD VENIPUNCTURE: CPT

## 2025-07-26 PROCEDURE — 6370000000 HC RX 637 (ALT 250 FOR IP): Performed by: INTERNAL MEDICINE

## 2025-07-26 PROCEDURE — 80048 BASIC METABOLIC PNL TOTAL CA: CPT

## 2025-07-26 PROCEDURE — 81001 URINALYSIS AUTO W/SCOPE: CPT

## 2025-07-26 PROCEDURE — 83540 ASSAY OF IRON: CPT

## 2025-07-26 PROCEDURE — 9990000010 HC NO CHARGE VISIT: Performed by: PHYSICAL THERAPIST

## 2025-07-26 PROCEDURE — 87186 SC STD MICRODIL/AGAR DIL: CPT

## 2025-07-26 PROCEDURE — 82607 VITAMIN B-12: CPT

## 2025-07-26 PROCEDURE — 85014 HEMATOCRIT: CPT

## 2025-07-26 PROCEDURE — 82746 ASSAY OF FOLIC ACID SERUM: CPT

## 2025-07-26 PROCEDURE — 9990000010 HC NO CHARGE VISIT

## 2025-07-26 PROCEDURE — 6360000002 HC RX W HCPCS

## 2025-07-26 PROCEDURE — 84300 ASSAY OF URINE SODIUM: CPT

## 2025-07-26 PROCEDURE — 82728 ASSAY OF FERRITIN: CPT

## 2025-07-26 PROCEDURE — 51798 US URINE CAPACITY MEASURE: CPT

## 2025-07-26 PROCEDURE — 83935 ASSAY OF URINE OSMOLALITY: CPT

## 2025-07-26 PROCEDURE — 94760 N-INVAS EAR/PLS OXIMETRY 1: CPT

## 2025-07-26 PROCEDURE — 1200000000 HC SEMI PRIVATE

## 2025-07-26 PROCEDURE — 51701 INSERT BLADDER CATHETER: CPT

## 2025-07-26 PROCEDURE — 6370000000 HC RX 637 (ALT 250 FOR IP)

## 2025-07-26 PROCEDURE — 93010 ELECTROCARDIOGRAM REPORT: CPT | Performed by: INTERNAL MEDICINE

## 2025-07-26 PROCEDURE — 2500000003 HC RX 250 WO HCPCS: Performed by: INTERNAL MEDICINE

## 2025-07-26 RX ORDER — POLYETHYLENE GLYCOL 3350 17 G/17G
17 POWDER, FOR SOLUTION ORAL 2 TIMES DAILY
Status: DISCONTINUED | OUTPATIENT
Start: 2025-07-26 | End: 2025-08-01 | Stop reason: HOSPADM

## 2025-07-26 RX ORDER — BISACODYL 5 MG/1
10 TABLET, DELAYED RELEASE ORAL ONCE
Status: COMPLETED | OUTPATIENT
Start: 2025-07-26 | End: 2025-07-26

## 2025-07-26 RX ORDER — TAMSULOSIN HYDROCHLORIDE 0.4 MG/1
0.4 CAPSULE ORAL DAILY
Status: DISCONTINUED | OUTPATIENT
Start: 2025-07-26 | End: 2025-08-01 | Stop reason: HOSPADM

## 2025-07-26 RX ADMIN — Medication 1 G: at 12:50

## 2025-07-26 RX ADMIN — Medication 3 MG: at 20:11

## 2025-07-26 RX ADMIN — GABAPENTIN 100 MG: 100 CAPSULE ORAL at 14:18

## 2025-07-26 RX ADMIN — GABAPENTIN 100 MG: 100 CAPSULE ORAL at 20:12

## 2025-07-26 RX ADMIN — Medication 15 G: at 12:48

## 2025-07-26 RX ADMIN — Medication 1 G: at 16:08

## 2025-07-26 RX ADMIN — BISACODYL 10 MG: 5 TABLET, COATED ORAL at 12:50

## 2025-07-26 RX ADMIN — Medication 15 G: at 22:45

## 2025-07-26 RX ADMIN — AMIODARONE HYDROCHLORIDE 200 MG: 200 TABLET ORAL at 12:50

## 2025-07-26 RX ADMIN — TAMSULOSIN HYDROCHLORIDE 0.4 MG: 0.4 CAPSULE ORAL at 12:50

## 2025-07-26 RX ADMIN — OXYCODONE AND ACETAMINOPHEN 1 TABLET: 5; 325 TABLET ORAL at 20:11

## 2025-07-26 RX ADMIN — POLYETHYLENE GLYCOL 3350 17 G: 17 POWDER, FOR SOLUTION ORAL at 20:11

## 2025-07-26 RX ADMIN — POLYETHYLENE GLYCOL 3350 17 G: 17 POWDER, FOR SOLUTION ORAL at 12:48

## 2025-07-26 RX ADMIN — WATER 1000 MG: 1 INJECTION INTRAMUSCULAR; INTRAVENOUS; SUBCUTANEOUS at 13:00

## 2025-07-26 RX ADMIN — MORPHINE SULFATE 2 MG: 2 INJECTION, SOLUTION INTRAMUSCULAR; INTRAVENOUS at 04:50

## 2025-07-26 RX ADMIN — OXYCODONE AND ACETAMINOPHEN 1 TABLET: 5; 325 TABLET ORAL at 08:34

## 2025-07-26 RX ADMIN — OXYCODONE AND ACETAMINOPHEN 1 TABLET: 5; 325 TABLET ORAL at 14:19

## 2025-07-26 ASSESSMENT — PAIN SCALES - GENERAL
PAINLEVEL_OUTOF10: 10
PAINLEVEL_OUTOF10: 10
PAINLEVEL_OUTOF10: 2
PAINLEVEL_OUTOF10: 10
PAINLEVEL_OUTOF10: 10

## 2025-07-26 ASSESSMENT — PAIN DESCRIPTION - FREQUENCY: FREQUENCY: CONTINUOUS

## 2025-07-26 ASSESSMENT — PAIN - FUNCTIONAL ASSESSMENT
PAIN_FUNCTIONAL_ASSESSMENT: PREVENTS OR INTERFERES WITH MANY ACTIVE NOT PASSIVE ACTIVITIES
PAIN_FUNCTIONAL_ASSESSMENT: INTOLERABLE, UNABLE TO DO ANY ACTIVE OR PASSIVE ACTIVITIES

## 2025-07-26 ASSESSMENT — PAIN DESCRIPTION - LOCATION
LOCATION: GENERALIZED;BACK
LOCATION: GENERALIZED;BACK
LOCATION: KNEE
LOCATION: KNEE;BUTTOCKS

## 2025-07-26 ASSESSMENT — PAIN DESCRIPTION - ORIENTATION
ORIENTATION: LEFT;RIGHT
ORIENTATION: LEFT;RIGHT
ORIENTATION: RIGHT;LEFT
ORIENTATION: RIGHT;LEFT

## 2025-07-26 ASSESSMENT — PAIN DESCRIPTION - DESCRIPTORS
DESCRIPTORS: ACHING;DISCOMFORT
DESCRIPTORS: ACHING
DESCRIPTORS: ACHING;DISCOMFORT
DESCRIPTORS: ACHING

## 2025-07-26 ASSESSMENT — PAIN DESCRIPTION - ONSET: ONSET: ON-GOING

## 2025-07-26 ASSESSMENT — PAIN DESCRIPTION - PAIN TYPE: TYPE: CHRONIC PAIN

## 2025-07-26 ASSESSMENT — PAIN SCALES - WONG BAKER: WONGBAKER_NUMERICALRESPONSE: HURTS A LITTLE BIT

## 2025-07-26 NOTE — PROGRESS NOTES
V2.0    [facility] [place] [facility] Progress Note      Name:  Radha Carcamo /Age/Sex: 1945  (79 y.o. female)   MRN & CSN:  [id number] & [id number] Encounter Date/Time: 2025 4:46 PM EDT   Location:  [id number] PCP: Madai Morocho APRN - NP     Attending:Batool Chandra MD       Hospital Day: 2    Assessment and Recommendations   Radha Carcamo is a 79 y.o. female who presents with Hyponatremia      Plan:     Hyponatremia  Hypochloremia  - Na 121 on admit  - nephrology consulted  - started on IVF NS at 100/hr  - monitor Na  - on ureNa    Acute GI bleed  Hematochezia  Constipation  Poss stercoral ulcer/colitis  - hb dropped from 9.3-->8.9  - monitor H&H  - GI consulted  - hold eliquis  - started on miralax    UTI  - UA reviewed  - avoid urine cx  - cont IV abx    Sacral wound  - wound care consulted    Review and resume home meds      Diet ADULT DIET; Clear Liquid   DVT Prophylaxis [] Lovenox, []  Heparin, [x] SCDs, [] Ambulation,  [] Eliquis, [] Xarelto  [] Coumadin   Code Status Full Code             Personally reviewed Lab Studies and Imaging       Subjective:     Still with blood in BM    Review of Systems:      Pertinent positives and negatives discussed in HPI    Objective:     Intake/Output Summary (Last 24 hours) at 2025 1646  Last data filed at 2025 1212  Gross per 24 hour   Intake --   Output 500 ml   Net -500 ml      Vitals:   Vitals:    25 1212 25 1419 25 1449 25 1555   BP: 123/65   (!) 102/54   Pulse: 75   81   Resp: 17 20 16 17   Temp: 98.8 °F (37.1 °C)   98.4 °F (36.9 °C)   TempSrc: Oral   Oral   SpO2: 98%   98%   Weight:       Height:             Physical Exam:      General: Awake, pleasantly confused  HEENT: Pupils round, reacting to light  Heart: S1 S2 heard, no murmurs  Lung: Clear b/l  Abd: Soft, not distended, not tender, BS +  Extr: No cyanosis or clubbing  Neuro: No focal deficits.      Medications:   Medications:    cefTRIAXone (ROCEPHIN) IV  1,000 mg

## 2025-07-26 NOTE — ED NOTES
ED TO INPATIENT SBAR HANDOFF    Patient Name: Radha Carcamo   Preferred Name: Radha  : 1945  79 y.o.   Family/Caregiver Present: no   Code Status Order: Full Code  PO Status: NPO:No  Telemetry Order: Yes  C-SSRS: Risk of Suicide: No Risk  Sitter no   Restraints:     Sepsis Risk Score      Situation  Chief Complaint   Patient presents with    Rectal Bleeding     Pt came from a nursing facility who reported pt has bright red liquid stool that started two days ago. Pt reports 10/10 bilateral knee pain.Notable bilateral lower extremity edema.      Brief Description of Patient's Condition: Pt presents with gross blood in her diarrhea   Mental Status: oriented  Arrived from:Home  Imaging:   CTA ABDOMEN PELVIS W CONTRAST   Final Result   Evaluation somewhat limited by diffuse beam hardening and quantum mottle   artifact.      1.  No CT findings of an active gastrointestinal hemorrhage.      2.  Very large stool burden throughout the colon and rectum.  Wall thickening   of the rectum may relate to stercoral proctitis.  Recommend correlation with   findings of constipation.      3.  Colonic diverticulosis.      4.  Mild circumferential bladder wall thickening with air in the bladder   lumen that may relate to recent instrumentation or cystitis.         XR CHEST PORTABLE   Final Result   No acute cardiopulmonary process.           Abnormal labs:   Abnormal Labs Reviewed   CBC WITH AUTO DIFFERENTIAL - Abnormal; Notable for the following components:       Result Value    RBC 3.02 (*)     Hemoglobin 9.3 (*)     Hematocrit 27.8 (*)     RDW 16.1 (*)     Platelets 589 (*)     All other components within normal limits   BASIC METABOLIC PANEL W/ REFLEX TO MG FOR LOW K - Abnormal; Notable for the following components:    Sodium 121 (*)     Chloride 86 (*)     Glucose 101 (*)     BUN 21 (*)     Creatinine 0.3 (*)     All other components within normal limits   BRAIN NATRIURETIC PEPTIDE - Abnormal; Notable for the following

## 2025-07-26 NOTE — PROGRESS NOTES
Patient is admitted from ED to bed 4126. Patients is orient x4. VSS. Patients assessment performed, patient have 4th stage in sacrum. Wound care consulted. Wound care done. Patient is educated about room and call light. Bed is in lowest position and alarm on.

## 2025-07-26 NOTE — PROGRESS NOTES
Physical Therapy    Radha Dona  [id number]  [id number]/4126-01    PT orders received and chart reviewed; attempted to see for PT raffy however pt reported she stays in bed at [facility] and is total care for her ADLs; pt has a stage IV decubitus on her backside/sacrum and pt reports it is very painful any time she moves.   Will sign off from therapy at this time  Electronically signed by MELINDA DENIS, PT on 7/26/2025 at 12:45 PM

## 2025-07-26 NOTE — PROGRESS NOTES
Occupational Therapy  Radha Carcamo  [id number]  [id number]    OT orders received, chart reviewed. Therapist to pt's room, pt in bed, states she does not get out of bed at [facility], total A for ADLs from bed. No acute OT needs identified. Will sign off.

## 2025-07-26 NOTE — CONSULTS
GASTROENTEROLOGY INPATIENT CONSULTATION:        IDENTIFYING DATA/REASON FOR CONSULTATION   PATIENT:  Radha Carcamo  MRN:  [id number]  ADMIT DATE: 7/25/2025  TIME OF EVALUATION: 7/26/2025 11:18 AM  HOSPITAL STAY:   LOS: 1 day     REASON FOR CONSULTATION:  rectal bleeding     HISTORY OF PRESENT ILLNESS   Radha Carcamo is a 79 y.o. female with chronic hyponatremia, atrial fibrillation on Eliquis and amiodarone, chronic sacral decubitus ulcers, history of PE, presenting with hyponatremia.  Patient states that she had blood in her catheter.  No family at the bedside to help provide history.  GI is consulted for possibility of rectal bleeding.  According to HPI patient had blood in her diaper and was sent to the ER for further evaluation.  She is complaining of back pain, mild abdominal discomfort.  She has never previously had a colonoscopy because her sister told her that they hurt.  She is willing to have one if she gets sedated.     Sodium noted to be 120 at admission though chronically around 130 and she gets salt tabs at her nursing facility.  She is on Eliquis which has been held as of yesterday.  CT was notable for very large stool burden and lactulose was prescribed but she has declined it.    PAST MEDICAL, SURGICAL, FMILY, and SOCIAL HISTORY     Past Medical History:   Diagnosis Date    Spinal stenosis      Past Surgical History:   Procedure Laterality Date    EYE SURGERY      cataract ou    RECTAL SURGERY N/A 5/20/2025    EXCISIONAL DEBRIDEMENT SACRAL ULCER performed by Milo Champion MD at [facility] OR     Family History   Problem Relation Age of Onset    Cancer Mother     Cancer Father      Social History     Socioeconomic History    Marital status:    Tobacco Use    Smoking status: Former     Current packs/day: 1.00     Average packs/day: 1 pack/day for 10.0 years (10.0 ttl pk-yrs)     Types: Cigarettes    Smokeless tobacco: Never   Substance and Sexual Activity    Alcohol use: Yes     Comment:

## 2025-07-26 NOTE — PROGRESS NOTES
Medication Reconciliation    List of medications patient is currently taking is not complete.     Source of information: 1. Partial medication list from [facility]                                      2. EPIC records      Notes regarding home medications:   1. [facility] to the [facility], they are faxing us a list but it is missing pages, including page 6 which has a bulk of the pt's medications   2. Marked medication usage I was able to verify from pages 6 and 7 of her MAR    Danielle Del Real [facility], PharmD 7/26/2025 1:54 PM

## 2025-07-26 NOTE — PROGRESS NOTES
4 Eyes Skin Assessment     NAME:  Radha Carcamo  YOB: 1945  MEDICAL RECORD NUMBER:  [id number]    The patient is being assessed for  Admission    I agree that at least one RN has performed a thorough Head to Toe Skin Assessment on the patient. ALL assessment sites listed below have been assessed.      Areas assessed by both nurses:    Head, Face, Ears, Shoulders, Back, Chest, Arms, Elbows, Hands, Sacrum. Buttock, Coccyx, Ischium, Legs. Feet and Heels, and Under Medical Devices         Does the Patient have a Wound? Yes wound(s) were present on assessment. LDA wound assessment was Initiated and completed by RN       Dao Prevention initiated by RN: Yes  Wound Care Orders initiated by RN: Yes    For hospital-acquired stage 1 & 2 and ALL Stage 3,4, Unstageable, DTI, NWPT, and Complex wounds: place order “IP Wound Care/Ostomy Nurse Eval and Treat” by RN under : Yes    New Ostomies, if present place, Ostomy referral order under : No     Nurse 1 eSignature: Electronically signed by KHATIWADA,SWASTIKA, RN on 7/25/25 at 11:53 PM EDT    **SHARE this note so that the co-signing nurse can place an eSignature**    Nurse 2 eSignature: Electronically signed by Judith Moses RN on 7/26/25 at 3:21 AM EDT

## 2025-07-26 NOTE — PROGRESS NOTES
Patient had no any output over night. Bladder scan shows 900 ml. Perfect serve to Dr. Virk, received one time straight cath order. Patient had output 1000 ml through straight cath. PVR shows 0 ml. Urine sample send to lab as order.

## 2025-07-27 LAB
ANION GAP SERPL CALCULATED.3IONS-SCNC: 11 MMOL/L (ref 3–16)
BUN SERPL-MCNC: 28 MG/DL (ref 7–20)
CALCIUM SERPL-MCNC: 9 MG/DL (ref 8.3–10.6)
CHLORIDE SERPL-SCNC: 91 MMOL/L (ref 99–110)
CO2 SERPL-SCNC: 23 MMOL/L (ref 21–32)
CREAT SERPL-MCNC: <0.2 MG/DL (ref 0.6–1.2)
GFR SERPLBLD CREATININE-BSD FMLA CKD-EPI: >90 ML/MIN/{1.73_M2}
GLUCOSE SERPL-MCNC: 99 MG/DL (ref 70–99)
HCT VFR BLD AUTO: 23.3 % (ref 36–48)
HCT VFR BLD AUTO: 23.9 % (ref 36–48)
HCT VFR BLD AUTO: 24.4 % (ref 36–48)
HGB BLD-MCNC: 7.9 G/DL (ref 12–16)
HGB BLD-MCNC: 8.2 G/DL (ref 12–16)
HGB BLD-MCNC: 8.2 G/DL (ref 12–16)
POTASSIUM SERPL-SCNC: 4.1 MMOL/L (ref 3.5–5.1)
SODIUM SERPL-SCNC: 125 MMOL/L (ref 136–145)
SODIUM UR-SCNC: 24 MMOL/L

## 2025-07-27 PROCEDURE — 6370000000 HC RX 637 (ALT 250 FOR IP): Performed by: INTERNAL MEDICINE

## 2025-07-27 PROCEDURE — 80048 BASIC METABOLIC PNL TOTAL CA: CPT

## 2025-07-27 PROCEDURE — 36415 COLL VENOUS BLD VENIPUNCTURE: CPT

## 2025-07-27 PROCEDURE — 85014 HEMATOCRIT: CPT

## 2025-07-27 PROCEDURE — 6370000000 HC RX 637 (ALT 250 FOR IP)

## 2025-07-27 PROCEDURE — 2500000003 HC RX 250 WO HCPCS: Performed by: INTERNAL MEDICINE

## 2025-07-27 PROCEDURE — 85018 HEMOGLOBIN: CPT

## 2025-07-27 PROCEDURE — 94760 N-INVAS EAR/PLS OXIMETRY 1: CPT

## 2025-07-27 PROCEDURE — 1200000000 HC SEMI PRIVATE

## 2025-07-27 PROCEDURE — 2500000003 HC RX 250 WO HCPCS

## 2025-07-27 PROCEDURE — 6360000002 HC RX W HCPCS

## 2025-07-27 PROCEDURE — 6360000002 HC RX W HCPCS: Performed by: INTERNAL MEDICINE

## 2025-07-27 RX ORDER — SIMETHICONE 80 MG
320 TABLET,CHEWABLE ORAL ONCE
Status: COMPLETED | OUTPATIENT
Start: 2025-07-27 | End: 2025-07-27

## 2025-07-27 RX ORDER — BISACODYL 5 MG/1
10 TABLET, DELAYED RELEASE ORAL ONCE
Status: COMPLETED | OUTPATIENT
Start: 2025-07-27 | End: 2025-07-27

## 2025-07-27 RX ADMIN — POLYETHYLENE GLYCOL 3350 17 G: 17 POWDER, FOR SOLUTION ORAL at 21:18

## 2025-07-27 RX ADMIN — MORPHINE SULFATE 2 MG: 2 INJECTION, SOLUTION INTRAMUSCULAR; INTRAVENOUS at 21:19

## 2025-07-27 RX ADMIN — MORPHINE SULFATE 2 MG: 2 INJECTION, SOLUTION INTRAMUSCULAR; INTRAVENOUS at 11:59

## 2025-07-27 RX ADMIN — TAMSULOSIN HYDROCHLORIDE 0.4 MG: 0.4 CAPSULE ORAL at 08:40

## 2025-07-27 RX ADMIN — GABAPENTIN 100 MG: 100 CAPSULE ORAL at 21:19

## 2025-07-27 RX ADMIN — OXYCODONE AND ACETAMINOPHEN 1 TABLET: 5; 325 TABLET ORAL at 08:41

## 2025-07-27 RX ADMIN — WATER 1000 MG: 1 INJECTION INTRAMUSCULAR; INTRAVENOUS; SUBCUTANEOUS at 12:18

## 2025-07-27 RX ADMIN — IRON SUCROSE 200 MG: 20 INJECTION, SOLUTION INTRAVENOUS at 12:26

## 2025-07-27 RX ADMIN — ACETAMINOPHEN 650 MG: 325 TABLET ORAL at 19:26

## 2025-07-27 RX ADMIN — Medication 15 G: at 08:54

## 2025-07-27 RX ADMIN — ONDANSETRON 4 MG: 2 INJECTION, SOLUTION INTRAMUSCULAR; INTRAVENOUS at 21:19

## 2025-07-27 RX ADMIN — Medication 15 G: at 21:18

## 2025-07-27 RX ADMIN — POLYETHYLENE GLYCOL-3350 AND ELECTROLYTES 4000 ML: 236; 6.74; 5.86; 2.97; 22.74 POWDER, FOR SOLUTION ORAL at 17:37

## 2025-07-27 RX ADMIN — POLYETHYLENE GLYCOL 3350 17 G: 17 POWDER, FOR SOLUTION ORAL at 08:54

## 2025-07-27 RX ADMIN — GABAPENTIN 100 MG: 100 CAPSULE ORAL at 14:32

## 2025-07-27 RX ADMIN — SIMETHICONE 320 MG: 80 TABLET, CHEWABLE ORAL at 14:33

## 2025-07-27 RX ADMIN — MORPHINE SULFATE 2 MG: 2 INJECTION, SOLUTION INTRAMUSCULAR; INTRAVENOUS at 17:37

## 2025-07-27 RX ADMIN — Medication 3 MG: at 21:19

## 2025-07-27 RX ADMIN — MORPHINE SULFATE 2 MG: 2 INJECTION, SOLUTION INTRAMUSCULAR; INTRAVENOUS at 05:57

## 2025-07-27 RX ADMIN — AMIODARONE HYDROCHLORIDE 200 MG: 200 TABLET ORAL at 08:40

## 2025-07-27 RX ADMIN — GABAPENTIN 100 MG: 100 CAPSULE ORAL at 08:40

## 2025-07-27 RX ADMIN — OXYCODONE AND ACETAMINOPHEN 1 TABLET: 5; 325 TABLET ORAL at 15:43

## 2025-07-27 RX ADMIN — SODIUM CHLORIDE, PRESERVATIVE FREE 10 ML: 5 INJECTION INTRAVENOUS at 21:19

## 2025-07-27 RX ADMIN — BISACODYL 10 MG: 5 TABLET, COATED ORAL at 14:37

## 2025-07-27 RX ADMIN — SODIUM CHLORIDE, PRESERVATIVE FREE 10 ML: 5 INJECTION INTRAVENOUS at 11:58

## 2025-07-27 ASSESSMENT — PAIN SCALES - WONG BAKER: WONGBAKER_NUMERICALRESPONSE: HURTS LITTLE MORE

## 2025-07-27 ASSESSMENT — PAIN DESCRIPTION - LOCATION
LOCATION: KNEE;GENERALIZED
LOCATION: COCCYX
LOCATION: COCCYX
LOCATION: BACK;GENERALIZED
LOCATION: COCCYX;SACRUM
LOCATION: SACRUM
LOCATION: COCCYX;KNEE

## 2025-07-27 ASSESSMENT — PAIN SCALES - GENERAL
PAINLEVEL_OUTOF10: 10
PAINLEVEL_OUTOF10: 4
PAINLEVEL_OUTOF10: 10

## 2025-07-27 ASSESSMENT — PAIN DESCRIPTION - ORIENTATION: ORIENTATION: RIGHT;LEFT

## 2025-07-27 ASSESSMENT — PAIN DESCRIPTION - DESCRIPTORS: DESCRIPTORS: ACHING

## 2025-07-27 NOTE — PROGRESS NOTES
[facility]               [phone]        Admit Date: 7/25/2025      Reason for consultation   Recurrent hyponatremia.    Reason for admission   Hyponatremia  79 y.o. female with a pmh of chronic hyponatremia baseline 130, atrial fibrillation on Eliquis, history of PE chronic sacral decubitus ulcer, history of MRSA, history of PE, lumbar spinal stenosis who presents with Hyponatremia       Impression  Urine chemistry suggest SIADH with urine sodium 24 and urine osmolality 587  But this is on urea  Still feel significant  contribution of protein malnutrition  Ensure +1 can  3 times daily  Urea twice daily  Stop salt pills    INTERVAL HISTORY  Seen in room comfortable.  /66   No evidence of fluid overload.    Labs.  Sodium 122--125.   Potassium 4.1  BUN 27 creatinine 0.3.  Glucose 148.  Calcium 8.8  Hemoglobin 8.3  Urine specific gravity 1.032   Urine sodium 98--24   osmolality 233-587      PLAN   Stop normal saline  Continue with the urea.  High-protein diet  Patient drinking a lot to restrict  Change Daily labs sodium is not changing much  Place [place] very high PVR cannot urinate     Recurrent Hhyponatremia.  In the past did not respond to tolvaptan as per my partner's note   Sodium 121.  124--129 and 5/28/2025.  130  2014  UA specific gravity 1032  Echo 4/2025 normal EF  Albumin 2.6 in the past normal LFTs     Acute GI bleeding  #Bright red blood per rectum  - Hemoglobin 9.3,   CTA of abdominal pelvic with contrast no CT findings of acute GI hemorrhage.      #Constipation  - CT of abdominal pelvic with contrast, Very large stool burden throughout the colon and rectum may relate to stercoral proctitis.  - Will provide 1 dose of lactulose and continue home regimen     #Sacral wound currently not infected  #Chronic back pain secondary to above  - Will consult wound care and continue home medication for pain     #Paroxysmal atrial fibrillation   #Bilateral lower extremity

## 2025-07-27 NOTE — PROGRESS NOTES
Pt alert and oriented to self and situation. Pt cleaned up by this RN and PCA. Pt tolerated lunch medications well. This RN fed pt the ensure and ginger ale. Call light within reach. Pt on right side with wedge. Pt still having bloody stool with small formed stool. MD Chandra made aware during rounding.    Electronically signed by Ting Anaya RN on 7/27/2025 at 12:52 PM

## 2025-07-27 NOTE — PLAN OF CARE
Problem: Discharge Planning  Goal: Discharge to home or other facility with appropriate resources  7/27/2025 1009 by Ting Anaya RN  Outcome: Progressing  7/27/2025 0015 by Khatiwada, Swastika, RN  Outcome: Progressing  7/26/2025 2039 by Khatiwada, Swastika, RN  Outcome: Progressing     Problem: Pain  Goal: Verbalizes/displays adequate comfort level or baseline comfort level  7/27/2025 1009 by Ting Anaya RN  Outcome: Progressing  7/27/2025 0015 by Khatiwada, Swastika, RN  Outcome: Progressing  7/26/2025 2039 by Khatiwada, Swastika, RN  Outcome: Progressing     Problem: Skin/Tissue Integrity  Goal: Skin integrity remains intact  Description: 1.  Monitor for areas of redness and/or skin breakdown  2.  Assess vascular access sites hourly  3.  Every 4-6 hours minimum:  Change oxygen saturation probe site  4.  Every 4-6 hours:  If on nasal continuous positive airway pressure, respiratory therapy assess nares and determine need for appliance change or resting period  7/27/2025 1009 by Ting Anaya RN  Outcome: Progressing  7/27/2025 0015 by Khatiwada, Swastika, RN  Outcome: Progressing  7/26/2025 2039 by Khatiwada, Swastika, RN  Outcome: Progressing     Problem: Safety - Adult  Goal: Free from fall injury  7/27/2025 1009 by Ting Anaya RN  Outcome: Progressing  7/27/2025 0015 by Khatiwada, Swastika, RN  Outcome: Progressing  7/26/2025 2039 by Khatiwada, Swastika, RN  Outcome: Progressing     Problem: ABCDS Injury Assessment  Goal: Absence of physical injury  7/27/2025 1009 by Ting Anaya RN  Outcome: Progressing  7/27/2025 0015 by Khatiwada, Swastika, RN  Outcome: Progressing  7/26/2025 2039 by Khatiwada, Swastika, RN  Outcome: Progressing

## 2025-07-27 NOTE — PROGRESS NOTES
Gastroenterology Progress Note    Radha Carcamo is a 79 y.o. female patient.  Hospitalization Day:2    SUBJECTIVE: No acute events overnight  Rectal bleeding noted as nursing was cleaning patient up today with blood-tinged stool      ROS:  Gastrointestinal ROS: positive for - blood in stools    Physical    VITALS:  BP (!) 112/59   Pulse 81   Temp 98.8 °F (37.1 °C) (Oral)   Resp 17   Ht 1.626 m (5' 4\")   Wt 63.3 kg (139 lb 8.8 oz)   SpO2 95%   BMI 23.95 kg/m²   TEMPERATURE:  Current - Temp: 98.8 °F (37.1 °C); Max - Temp  Av.5 °F (36.9 °C)  Min: 97.6 °F (36.4 °C)  Max: 99.1 °F (37.3 °C)    General:  Alert and oriented,  No apparent distress  Skin- without jaundice  Eyes: anicteric sclera  Abdomen soft, ND, NT, no HSM, Bowel sounds normal  Ext: without edema  Neuro: no asterixis     Data    Data Review:    Recent Labs     25  1600 25  2308 25  0443 25  1147 25  1931 25  0031 25  0749   WBC 8.4  --  6.1  --   --   --   --    HGB 9.3*   < > 8.9*  8.7*   < > 8.3* 8.2* 8.2*   HCT 27.8*   < > 25.7*  26.3*   < > 24.1* 23.9* 24.4*   MCV 92.0  --  90.4  --   --   --   --    *  --  493*  --   --   --   --     < > = values in this interval not displayed.     Recent Labs     25  1459 25  19325  0507   * 123* 125*   K 4.5 4.2 4.1   CL 88* 91* 91*   CO2 24 22 23   BUN 27* 24* 28*   CREATININE 0.3* <0.2* <0.2*     No results for input(s): \"AST\", \"ALT\", \"BILIDIR\", \"BILITOT\", \"ALKPHOS\" in the last 72 hours.    Invalid input(s): \"ALB\"  Recent Labs     25  1600   LIPASE 30.0     Recent Labs     25  1600   PROTIME 16.0*   INR 1.26*       Radiology Review:    CTA ABDOMEN PELVIS W CONTRAST   Final Result   Evaluation somewhat limited by diffuse beam hardening and quantum mottle   artifact.      1.  No CT findings of an active gastrointestinal hemorrhage.      2.  Very large stool burden throughout the colon and rectum.  Wall thickening

## 2025-07-27 NOTE — PROGRESS NOTES
Per MD Chandra give pt dulcolax suppository per GI note.    Electronically signed by Ting Anaya RN on 7/27/2025 at 11:50 AM

## 2025-07-27 NOTE — PROGRESS NOTES
V2.0    [facility] [place]      Name:  Radha Carcamo /Age/Sex: 1945  (79 y.o. female)   MRN & CSN:  [id number] & [id number] Encounter Date/Time: 2025 4:47 PM EDT   Location:  G9R-[id number]/4126-01 PCP: Madai Morocho APRN - NP     Attending:Batool Chandra MD       Hospital Day: 3    Assessment and Recommendations   Radha Carcamo is a 79 y.o. female who presents with Hyponatremia      Plan:       Hyponatremia  Hypochloremia  - Na 121 on admit  - nephrology consulted  - started on IVF NS at 100/hr  - monitor Na  - on ureNa     Acute GI bleed  Hematochezia  Constipation  Poss stercoral ulcer/colitis  - hb dropped from 9.3-->8.9  - monitor H&H  - GI consulted  - hold eliquis  - started on miralax  - GI ordered golytely     UTI  - UA reviewed  - await urine cx  - cont IV abx     Sacral wound  - wound care consulted     Review and resume home meds         Diet Diet NPO  ADULT DIET; Clear Liquid   DVT Prophylaxis [] Lovenox, []  Heparin, [x] SCDs, [] Ambulation,  [] Eliquis, [] Xarelto  [] Coumadin   Code Status Full Code             Personally reviewed Lab Studies and Imaging       Subjective:     Na improved, has not had a BM yet, still with some blood in stools    Review of Systems:      Pertinent positives and negatives discussed in HPI    Objective:     Intake/Output Summary (Last 24 hours) at 2025 1647  Last data filed at 2025 1641  Gross per 24 hour   Intake 840 ml   Output 1500 ml   Net -660 ml      Vitals:   Vitals:    25 0911 25 1044 25 1147 25 1229   BP:   (!) 112/59    Pulse:   81    Resp: 17  17 17   Temp:   98.8 °F (37.1 °C)    TempSrc:   Oral    SpO2:  95% 95%    Weight:       Height:             Physical Exam:        General: Awake, pleasantly confused  HEENT: Pupils round, reacting to light  Heart: S1 S2 heard, no murmurs  Lung: Clear b/l  Abd: Soft, not distended, not tender, BS +  Extr: No cyanosis or clubbing  Neuro: No focal

## 2025-07-27 NOTE — PLAN OF CARE
Problem: Discharge Planning  Goal: Discharge to home or other facility with appropriate resources  7/27/2025 0015 by Khatiwada, Swastika, RN  Outcome: Progressing  7/26/2025 2039 by Khatiwada, Swastika, RN  Outcome: Progressing  7/26/2025 1652 by Paola Pond RN  Outcome: Progressing     Problem: Pain  Goal: Verbalizes/displays adequate comfort level or baseline comfort level  7/27/2025 0015 by Khatiwada, Swastika, RN  Outcome: Progressing  7/26/2025 2039 by Khatiwada, Swastika, RN  Outcome: Progressing  7/26/2025 1652 by Paola Pond RN  Outcome: Progressing     Problem: Skin/Tissue Integrity  Goal: Skin integrity remains intact  Description: 1.  Monitor for areas of redness and/or skin breakdown  2.  Assess vascular access sites hourly  3.  Every 4-6 hours minimum:  Change oxygen saturation probe site  4.  Every 4-6 hours:  If on nasal continuous positive airway pressure, respiratory therapy assess nares and determine need for appliance change or resting period  7/27/2025 0015 by Khatiwada, Swastika, RN  Outcome: Progressing  7/26/2025 2039 by Khatiwada, Swastika, RN  Outcome: Progressing  7/26/2025 1652 by Paola Pond RN  Outcome: Progressing     Problem: Safety - Adult  Goal: Free from fall injury  7/27/2025 0015 by Khatiwada, Swastika, RN  Outcome: Progressing  7/26/2025 2039 by Khatiwada, Swastika, RN  Outcome: Progressing  7/26/2025 1652 by Paola Pond RN  Outcome: Progressing     Problem: ABCDS Injury Assessment  Goal: Absence of physical injury  7/27/2025 0015 by Khatiwada, Swastika, RN  Outcome: Progressing  7/26/2025 2039 by Khatiwada, Swastika, RN  Outcome: Progressing  7/26/2025 1652 by Paola Pond RN  Outcome: Progressing

## 2025-07-27 NOTE — PROGRESS NOTES
Pt alert and oriented. VSS. Pt extremely anxious. Boyfriend at the bedside. Pt has small red drainage coming out of rectum.    Electronically signed by Ting Anaya RN on 7/27/2025 at 10:13 AM

## 2025-07-27 NOTE — CONSULTS
[email or web address]               [phone]        Admit Date: 7/25/2025      Reason for consultation   recurrent hyponatremia.    Reason for admission   hyponatremia  79 y.o. female with a pmh of chronic hyponatremia baseline 130, atrial fibrillation on Eliquis, history of PE chronic sacral decubitus ulcer, history of MRSA, history of PE, lumbar spinal stenosis who presents with Hyponatremia       Impression  Her hyponatremia is most likely nutritional  She needs more protein  Ensure +1 g 3 times daily  Urea twice daily  Stop salt        INTERVAL HISTORY  Seen in room comfortable.  /56  No evidence of fluid overload.    Labs.  Sodium 122.    Potassium 4.5  BUN 27 creatinine 0.3.  Glucose 148.  Calcium 8.8  Hemoglobin 8.3  Urine specific gravity 1.032  Last    Urine sodium 98 with osmolality 233 in 5/2025          PLAN   Check urine osmolality and urine sodium  Stop normal saline  Continue with the urea.  High-protein diet  Patient drinking a lot of schedule restrict  Change Daily labs sodium is not changing much  Place lake very high PVR cannot urinate     Recurrent hyponatremia.  In the past did not respond to tolvaptan as per my partner's note   Sodium 121.  124--129 and 5/28/2025.  130  2014  UA specific gravity 1032  Echo 4/2025 normal EF  Albumin 2.6 in the past normal LFTs     Acute GI bleeding  #Bright red blood per rectum  - Hemoglobin 9.3,   CTA of abdominal pelvic with contrast no CT findings of acute GI hemorrhage.      #Constipation  - CT of abdominal pelvic with contrast, Very large stool burden throughout the colon and rectum may relate to stercoral proctitis.  - Will provide 1 dose of lactulose and continue home regimen     #Sacral wound currently not infected  #Chronic back pain secondary to above  - Will consult wound care and continue home medication for pain     #Paroxysmal atrial fibrillation   #Bilateral lower extremity swelling  -Stable currently will hold Lasix  due to hyponatremia.  Will hold Eliquis due to GI bleeding  -2D echo 5/19/2025: Echo with preserved LVEF, disease  - Continue amiodarone and metoprolo    Subjective:   Patient has no complaint       Review of Systems  Seen in room     Objective:     Patient Vitals for the past 8 hrs:   BP Temp Temp src Pulse Resp SpO2   07/26/25 1932 (!) 110/56 99.1 °F (37.3 °C) Oral 78 -- 97 %   07/26/25 1555 (!) 102/54 98.4 °F (36.9 °C) Oral 81 17 98 %   07/26/25 1449 -- -- -- -- 16 --   07/26/25 1419 -- -- -- -- 20 --   07/26/25 1212 123/65 98.8 °F (37.1 °C) Oral 75 17 98 %       I/O last 3 completed shifts:  In: -   Out: 750 [Urine:750]        General appearance:Awake, alert,   Neck: , no JVD and thyroid not enlarged,   Lungs: clear to auscultation bilaterally   Heart: regular rate and rhythm, S1, S2 normal, no murmur, click, rub or gallop  Abdomen: soft, non-tender; bowel sounds normal; no masses,  no organomegaly  Extremities: extremities normal, atraumatic, no edema  Skin: Skin color, texture, turgor normal. No rashes or lesions  Neurologic: Grossly normal     .l  Lab Results   Component Value Date    CREATININE 0.3 (L) 07/26/2025    BUN 27 (H) 07/26/2025     (L) 07/26/2025    K 4.5 07/26/2025    CL 88 (L) 07/26/2025    CO2 24 07/26/2025     Lab Results   Component Value Date    WBC 6.1 07/26/2025    HGB 8.3 (L) 07/26/2025    HCT 24.1 (L) 07/26/2025    MCV 90.4 07/26/2025     (H) 07/26/2025            AGLUCOSE)Magnesium:  No results found for: \"MG\"  Phosphorus:    Lab Results   Component Value Date/Time    PHOS 3.7 05/28/2025 05:40 AM       Uric Acid:  No components found for: \"URIC\"    Principal Problem:    Hyponatremia  Active Problems:    Acute GI bleeding  Resolved Problems:    * No resolved hospital problems. *

## 2025-07-27 NOTE — PROGRESS NOTES
Pt has Golytely ordered by GI MD for colonoscopy 7/28. 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 Pt continues to have bloody runny stools. MD Lyons and Prateek made aware of pt stools. I messaged MD Chandra to clarify starting the  golytely order with the stage 4 pressure injury. MD Chandra said to continue with the order and that we cannot place a rectal tube in patient.     Electronically signed by Ting Anaya RN on 7/27/2025 at 2:58 PM      Attempted to call pt boyfriend Mike who is listed in the chart for informed consent for colonoscopy. Pt boyfriend did not answer both numbers listed in chart and in pt room.  Electronically signed by Ting Anaya RN on 7/27/2025 at 3:55 PM

## 2025-07-28 ENCOUNTER — APPOINTMENT (OUTPATIENT)
Dept: ULTRASOUND IMAGING | Age: 80
End: 2025-07-28
Payer: MEDICARE

## 2025-07-28 LAB
ANION GAP SERPL CALCULATED.3IONS-SCNC: 10 MMOL/L (ref 3–16)
BASOPHILS # BLD: 0.1 K/UL (ref 0–0.2)
BASOPHILS NFR BLD: 0.9 %
BUN SERPL-MCNC: 22 MG/DL (ref 7–20)
CALCIUM SERPL-MCNC: 9 MG/DL (ref 8.3–10.6)
CHLORIDE SERPL-SCNC: 89 MMOL/L (ref 99–110)
CO2 SERPL-SCNC: 24 MMOL/L (ref 21–32)
CREAT SERPL-MCNC: <0.2 MG/DL (ref 0.6–1.2)
DEPRECATED RDW RBC AUTO: 15.7 % (ref 12.4–15.4)
EOSINOPHIL # BLD: 0.1 K/UL (ref 0–0.6)
EOSINOPHIL NFR BLD: 1.1 %
GFR SERPLBLD CREATININE-BSD FMLA CKD-EPI: >90 ML/MIN/{1.73_M2}
GLUCOSE SERPL-MCNC: 100 MG/DL (ref 70–99)
HCT VFR BLD AUTO: 23.3 % (ref 36–48)
HCT VFR BLD AUTO: 23.5 % (ref 36–48)
HCT VFR BLD AUTO: 24.9 % (ref 36–48)
HGB BLD-MCNC: 7.8 G/DL (ref 12–16)
HGB BLD-MCNC: 8 G/DL (ref 12–16)
HGB BLD-MCNC: 8.4 G/DL (ref 12–16)
LYMPHOCYTES # BLD: 1 K/UL (ref 1–5.1)
LYMPHOCYTES NFR BLD: 15.6 %
MCH RBC QN AUTO: 30.6 PG (ref 26–34)
MCHC RBC AUTO-ENTMCNC: 33.2 G/DL (ref 31–36)
MCV RBC AUTO: 92 FL (ref 80–100)
MONOCYTES # BLD: 0.4 K/UL (ref 0–1.3)
MONOCYTES NFR BLD: 7.2 %
NEUTROPHILS # BLD: 4.6 K/UL (ref 1.7–7.7)
NEUTROPHILS NFR BLD: 75.2 %
OSMOLALITY UR: 521 MOSM/KG (ref 390–1070)
PLATELET # BLD AUTO: 470 K/UL (ref 135–450)
PMV BLD AUTO: 6.9 FL (ref 5–10.5)
POTASSIUM SERPL-SCNC: 3.9 MMOL/L (ref 3.5–5.1)
RBC # BLD AUTO: 2.55 M/UL (ref 4–5.2)
SODIUM SERPL-SCNC: 121 MMOL/L (ref 136–145)
SODIUM SERPL-SCNC: 123 MMOL/L (ref 136–145)
SODIUM UR-SCNC: 23 MMOL/L
WBC # BLD AUTO: 6.2 K/UL (ref 4–11)

## 2025-07-28 PROCEDURE — 36415 COLL VENOUS BLD VENIPUNCTURE: CPT

## 2025-07-28 PROCEDURE — 6370000000 HC RX 637 (ALT 250 FOR IP): Performed by: INTERNAL MEDICINE

## 2025-07-28 PROCEDURE — 84295 ASSAY OF SERUM SODIUM: CPT

## 2025-07-28 PROCEDURE — 84300 ASSAY OF URINE SODIUM: CPT

## 2025-07-28 PROCEDURE — 2500000003 HC RX 250 WO HCPCS

## 2025-07-28 PROCEDURE — 1200000000 HC SEMI PRIVATE

## 2025-07-28 PROCEDURE — 85025 COMPLETE CBC W/AUTO DIFF WBC: CPT

## 2025-07-28 PROCEDURE — 85014 HEMATOCRIT: CPT

## 2025-07-28 PROCEDURE — 6370000000 HC RX 637 (ALT 250 FOR IP)

## 2025-07-28 PROCEDURE — 6360000002 HC RX W HCPCS: Performed by: INTERNAL MEDICINE

## 2025-07-28 PROCEDURE — 94760 N-INVAS EAR/PLS OXIMETRY 1: CPT

## 2025-07-28 PROCEDURE — 80048 BASIC METABOLIC PNL TOTAL CA: CPT

## 2025-07-28 PROCEDURE — 2580000003 HC RX 258: Performed by: INTERNAL MEDICINE

## 2025-07-28 PROCEDURE — 76705 ECHO EXAM OF ABDOMEN: CPT

## 2025-07-28 PROCEDURE — 83935 ASSAY OF URINE OSMOLALITY: CPT

## 2025-07-28 PROCEDURE — 85018 HEMOGLOBIN: CPT

## 2025-07-28 PROCEDURE — 6360000002 HC RX W HCPCS

## 2025-07-28 PROCEDURE — 2500000003 HC RX 250 WO HCPCS: Performed by: INTERNAL MEDICINE

## 2025-07-28 RX ORDER — METRONIDAZOLE 500 MG/100ML
500 INJECTION, SOLUTION INTRAVENOUS EVERY 8 HOURS
Status: DISCONTINUED | OUTPATIENT
Start: 2025-07-28 | End: 2025-07-28

## 2025-07-28 RX ORDER — SODIUM CHLORIDE 9 MG/ML
INJECTION, SOLUTION INTRAVENOUS CONTINUOUS
Status: DISCONTINUED | OUTPATIENT
Start: 2025-07-28 | End: 2025-07-28

## 2025-07-28 RX ADMIN — MORPHINE SULFATE 2 MG: 2 INJECTION, SOLUTION INTRAMUSCULAR; INTRAVENOUS at 19:51

## 2025-07-28 RX ADMIN — MEROPENEM 1000 MG: 1 INJECTION INTRAVENOUS at 23:05

## 2025-07-28 RX ADMIN — SODIUM CHLORIDE, PRESERVATIVE FREE 10 ML: 5 INJECTION INTRAVENOUS at 09:24

## 2025-07-28 RX ADMIN — IRON SUCROSE 200 MG: 20 INJECTION, SOLUTION INTRAVENOUS at 13:45

## 2025-07-28 RX ADMIN — Medication 3 MG: at 22:12

## 2025-07-28 RX ADMIN — GABAPENTIN 100 MG: 100 CAPSULE ORAL at 09:12

## 2025-07-28 RX ADMIN — MORPHINE SULFATE 2 MG: 2 INJECTION, SOLUTION INTRAMUSCULAR; INTRAVENOUS at 13:45

## 2025-07-28 RX ADMIN — GABAPENTIN 100 MG: 100 CAPSULE ORAL at 22:11

## 2025-07-28 RX ADMIN — ONDANSETRON 4 MG: 2 INJECTION, SOLUTION INTRAMUSCULAR; INTRAVENOUS at 22:12

## 2025-07-28 RX ADMIN — AMIODARONE HYDROCHLORIDE 200 MG: 200 TABLET ORAL at 09:12

## 2025-07-28 RX ADMIN — OXYCODONE AND ACETAMINOPHEN 1 TABLET: 5; 325 TABLET ORAL at 22:11

## 2025-07-28 RX ADMIN — MEROPENEM 1000 MG: 1 INJECTION INTRAVENOUS at 16:17

## 2025-07-28 RX ADMIN — POLYETHYLENE GLYCOL 3350 17 G: 17 POWDER, FOR SOLUTION ORAL at 22:12

## 2025-07-28 RX ADMIN — TAMSULOSIN HYDROCHLORIDE 0.4 MG: 0.4 CAPSULE ORAL at 09:12

## 2025-07-28 RX ADMIN — SODIUM CHLORIDE, PRESERVATIVE FREE 10 ML: 5 INJECTION INTRAVENOUS at 19:51

## 2025-07-28 RX ADMIN — POLYETHYLENE GLYCOL 3350 17 G: 17 POWDER, FOR SOLUTION ORAL at 09:13

## 2025-07-28 RX ADMIN — Medication 7.5 MG: at 13:47

## 2025-07-28 RX ADMIN — WATER 1000 MG: 1 INJECTION INTRAMUSCULAR; INTRAVENOUS; SUBCUTANEOUS at 13:44

## 2025-07-28 RX ADMIN — OXYCODONE AND ACETAMINOPHEN 1 TABLET: 5; 325 TABLET ORAL at 09:08

## 2025-07-28 RX ADMIN — ONDANSETRON 4 MG: 2 INJECTION, SOLUTION INTRAMUSCULAR; INTRAVENOUS at 04:52

## 2025-07-28 RX ADMIN — MORPHINE SULFATE 2 MG: 2 INJECTION, SOLUTION INTRAMUSCULAR; INTRAVENOUS at 04:48

## 2025-07-28 RX ADMIN — GABAPENTIN 100 MG: 100 CAPSULE ORAL at 13:48

## 2025-07-28 ASSESSMENT — PAIN DESCRIPTION - LOCATION
LOCATION: SACRUM
LOCATION: KNEE
LOCATION: KNEE

## 2025-07-28 ASSESSMENT — PAIN SCALES - GENERAL
PAINLEVEL_OUTOF10: 9
PAINLEVEL_OUTOF10: 9
PAINLEVEL_OUTOF10: 10

## 2025-07-28 ASSESSMENT — PAIN DESCRIPTION - ORIENTATION
ORIENTATION: RIGHT;LEFT
ORIENTATION: LEFT;RIGHT
ORIENTATION: MID

## 2025-07-28 ASSESSMENT — PAIN DESCRIPTION - DESCRIPTORS: DESCRIPTORS: STABBING;BURNING;DISCOMFORT

## 2025-07-28 NOTE — CARE COORDINATION
Advance Care Planning   Healthcare Decision Maker:    Primary Decision Maker: Pradeep Carcamo - Child - [phone]      Today we documented Decision Maker(s) consistent with Legal Next of Kin hierarchy.        Electronically signed by Kinjal Mcnally on 7/28/2025 at 12:45 PM

## 2025-07-28 NOTE — PROGRESS NOTES
Patient is provided with prn morphine due to pain rating 10/10 to sacral wound. Patient is educated that po pain medication is first line but states that \"the pain is too bad.\" Patient VSS. This nurse attempts to provide patient with golytely, patient is refusing stating \"it will make me poop more and you will have to change me too many times, it hurts.\" Patient is educated at this time , patient is still refusing. Patient complains of nausea, provided with prn zofran. Patient dressing to sacral area is changed at this time due to being soiled with assistance of second nurse Adelaida IVERSON. Cleansed with normal saline and aquacel is replaced along with abd pain. Lisa area is cleansed, lake care is provided. Barrier ointment is applied. Patient is tearful at during dressing change, but cooperative. This nurse attempts to redirect patient to drink golytely patient still refusing at this time. Patient is turned. Call light is provided. Bed is locked, in lowest position, armed.    Electronically signed by Rosetta Gallo RN on 7/27/2025 at 10:02 PM    2210: Hospitalist Isabel John NP is updated face to face by this nurse at this time with patients refusal of golytely.     Electronically signed by Rosetta Gallo RN on 7/27/2025 at 10:50 PM    0626: Dr. Agudelo is updated on patient's inability to complete bowel prep. MD states will be by to round on patient.    Electronically signed by Rosetta Gallo RN on 7/28/2025 at 6:41 AM

## 2025-07-28 NOTE — CARE COORDINATION
Wound Referral Progress Note       NAME:  Radha Carcamo  MEDICAL RECORD NUMBER:  [id number]  AGE: 79 y.o.   GENDER: female  : 1945  TODAY'S DATE:  2025    Subjective   Reason for WOCN Evaluation and Assessment: stage 4 sacral pressure injury. KIANNA Carcamo is a 79 y.o. female referred by:   Nursing    Wound Identification:  Wound Type: pressure  Contributing Factors: chronic pressure, decreased mobility, and shear force    Wound History: chronic wound  Current Wound Care Treatment:  alginate at [facility]; had VAC last admit    Patient Care Goal:  Wound Healing        PAST MEDICAL HISTORY        Diagnosis Date    Spinal stenosis        PAST SURGICAL HISTORY    Past Surgical History:   Procedure Laterality Date    EYE SURGERY      cataract ou    RECTAL SURGERY N/A 2025    EXCISIONAL DEBRIDEMENT SACRAL ULCER performed by Milo Champion MD at [facility] OR       FAMILY HISTORY    Family History   Problem Relation Age of Onset    Cancer Mother     Cancer Father        SOCIAL HISTORY    Social History     Tobacco Use    Smoking status: Former     Current packs/day: 1.00     Average packs/day: 1 pack/day for 10.0 years (10.0 ttl pk-yrs)     Types: Cigarettes    Smokeless tobacco: Never   Substance Use Topics    Alcohol use: Yes     Comment: occ    Drug use: No       ALLERGIES    Allergies   Allergen Reactions    Codeine Nausea And Vomiting       MEDICATIONS    No current facility-administered medications on file prior to encounter.     Current Outpatient Medications on File Prior to Encounter   Medication Sig Dispense Refill    calcium carbonate (OSCAL) 500 MG TABS tablet Take 1 tablet by mouth daily      sodium chloride 1 g tablet Take 1 tablet by mouth in the morning and 1 tablet in the evening.      urea (URE-NA) 15 g PACK packet Take 15 g by mouth daily For prophylaxis      bisacodyl (DULCOLAX) 10 MG suppository Place 1 suppository rectally daily as needed for Constipation      vitamin

## 2025-07-28 NOTE — PROGRESS NOTES
V2.0    [facility] Progress Note      Name:  Radha Carcamo /Age/Sex: 1945  (79 y.o. female)   MRN & CSN:  [id number] & [id number] Encounter Date/Time: 2025 2:03 PM EDT   Location:  [id number] PCP: Madai Morocho APRN - NP     Attending:Batool Chandra MD       Hospital Day: 4    Assessment and Recommendations   Radha Carcamo is a 79 y.o. female who presents with Hyponatremia      Plan:     Hyponatremia  Hypochloremia  - Na 121 on admit  - nephrology consulted  - started on IVF NS at 100/hr-->dced  - monitor Na  - on ureNa     Acute GI bleed  Hematochezia  Constipation  Poss stercoral ulcer/colitis  Iron def anemia  - hb dropped from 9.3-->8.9--:8.2-->8.0  - monitor H&H  - GI consulted  - hold eliquis  - started on miralax  - GI ordered golytely, poss colonoscopy today vs christina  - cont IV iron  - cont abx, add flagyl     UTI  - UA reviewed  - await urine cx with ESBL e coli and klebsiella  - switch rocephin to merrem #1     Sacral wound  - wound care consulted     Review and resume home meds         Diet ADULT DIET; Clear Liquid; 1200 ml   DVT Prophylaxis [] Lovenox, []  Heparin, [x] SCDs, [] Ambulation,  [] Eliquis, [] Xarelto  [] Coumadin   Code Status Full Code             Personally reviewed Lab Studies and Imaging       Subjective:     Has been having watery BM with golytely, Na still low    Review of Systems:      Pertinent positives and negatives discussed in HPI    Objective:     Intake/Output Summary (Last 24 hours) at 2025 1403  Last data filed at 2025 1203  Gross per 24 hour   Intake 840 ml   Output 2150 ml   Net -1310 ml      Vitals:   Vitals:    25 0850 25 0908 25 1113 25 1345   BP:   123/70    Pulse:   78    Resp:  18 18 18   Temp:   98 °F (36.7 °C)    TempSrc:   Oral    SpO2:   100%    Weight:       Height: 1.626 m (5' 4.02\")            Physical Exam:        General: Awake, pleasantly confused  HEENT: Pupils round, reacting to light  Heart: S1 S2

## 2025-07-28 NOTE — PROGRESS NOTES
Gastroenterology Progress Note    Radha Carcamo is a 79 y.o. female patient.  Principal Problem:    Hyponatremia  Active Problems:    Acute GI bleeding  Resolved Problems:    * No resolved hospital problems. *      SUBJECTIVE: Per nursing she did have 5 bowel movements overnight and per the patient she has not had any bowel movements.  No nausea or vomiting.  No abdominal pain.    Current Facility-Administered Medications: iron sucrose (VENOFER) injection 200 mg, 200 mg, IntraVENous, Q24H  cefTRIAXone (ROCEPHIN) 1,000 mg in sterile water 10 mL IV syringe, 1,000 mg, IntraVENous, Q24H  polyethylene glycol (GLYCOLAX) packet 17 g, 17 g, Oral, BID  tamsulosin (FLOMAX) capsule 0.4 mg, 0.4 mg, Oral, Daily  urea (URE-NA) packet 15 g, 15 g, Oral, BID  oxyCODONE-acetaminophen (PERCOCET) 5-325 MG per tablet 1 tablet, 1 tablet, Oral, Q6H PRN  [Held by provider] apixaban (ELIQUIS) tablet 5 mg, 5 mg, Oral, BID  gabapentin (NEURONTIN) capsule 100 mg, 100 mg, Oral, TID  [Held by provider] furosemide (LASIX) tablet 20 mg, 20 mg, Oral, Daily  sodium chloride flush 0.9 % injection 5-40 mL, 5-40 mL, IntraVENous, 2 times per day  sodium chloride flush 0.9 % injection 5-40 mL, 5-40 mL, IntraVENous, PRN  0.9 % sodium chloride infusion, , IntraVENous, PRN  potassium chloride (KLOR-CON M) extended release tablet 40 mEq, 40 mEq, Oral, PRN **OR** potassium bicarb-citric acid (EFFER-K) effervescent tablet 40 mEq, 40 mEq, Oral, PRN **OR** potassium chloride 10 mEq/100 mL IVPB (Peripheral Line), 10 mEq, IntraVENous, PRN  magnesium sulfate 2000 mg in 50 mL IVPB premix, 2,000 mg, IntraVENous, PRN  ondansetron (ZOFRAN-ODT) disintegrating tablet 4 mg, 4 mg, Oral, Q8H PRN **OR** ondansetron (ZOFRAN) injection 4 mg, 4 mg, IntraVENous, Q6H PRN  acetaminophen (TYLENOL) tablet 650 mg, 650 mg, Oral, Q6H PRN **OR** acetaminophen (TYLENOL) suppository 650 mg, 650 mg, Rectal, Q6H PRN  melatonin tablet 3 mg, 3 mg, Oral, Nightly  morphine (PF) injection 2

## 2025-07-28 NOTE — PROGRESS NOTES
Patient ID: Radha Carcamo  Referring/ Physician: Batool Chandra MD      Summary:   Radha Carcamo is being seen by nephrology for hyponatremia .     Reason for admission: presented with blood in stool. Found to have very large stool burden throughout the colon and rectum.  Wall thickening  of the rectum may relate to stercoral proctitis.    Hyponatremia noted on admission, asymptomatic with Na 121 initially.       Interval Hx:     Still having diarrhea.   Taking a lot of PO liquids but no solids.   Still has nausea.   No edema. No SOB.     /53  On room air   UO 1750 CC  PO intake 1560     Na 125 > 123  Cr < 0.2  BUN 22    URINE    URINE SODIUM 24        Assessment/Plan:   - Na dropped again today despite urea.   - has long standing hyponatremia, SIADH  - give tolvaptan 7.5 mg once   - hold urea for now while giving tolvaptan       Recurrent Hhyponatremia.  In the past did not respond to tolvaptan as per my partner's note   Sodium 121.  124--129 and 5/28/2025.  130  2014  UA specific gravity 1032  Echo 4/2025 normal EF  Albumin 2.6 in the past normal LFTs     Acute GI bleeding  #Bright red blood per rectum  - Hemoglobin 9.3,   CTA of abdominal pelvic with contrast no CT findings of acute GI hemorrhage.      #Constipation  - CT of abdominal pelvic with contrast, Very large stool burden throughout the colon and rectum may relate to stercoral proctitis.  - Will provide 1 dose of lactulose and continue home regimen     #Sacral wound currently not infected  #Chronic back pain secondary to above  - Will consult wound care and continue home medication for pain     #Paroxysmal atrial fibrillation   #Bilateral lower extremity swelling  -Stable currently will hold Lasix due to hyponatremia.  Will hold Eliquis due to GI bleeding  -2D echo 5/19/2025: Echo with preserved LVEF, disease  - Continue amiodarone and metoprolo              [facility] would like to thank you for  Ausculation shows RRR and  no edema   Abdomen: abdomen is soft  Neuro:  Follows commands      Data:   CBC:   Recent Labs     07/25/25  1600 07/25/25  2308 07/26/25  0443 07/26/25  1147 07/27/25  0749 07/27/25  1958 07/28/25  0759   WBC 8.4  --  6.1  --   --   --  6.2   HGB 9.3*   < > 8.9*  8.7*   < > 8.2* 7.9* 8.0*  7.8*   HCT 27.8*   < > 25.7*  26.3*   < > 24.4* 23.3* 23.3*  23.5*   *  --  493*  --   --   --  470*    < > = values in this interval not displayed.     BMP:    Recent Labs     07/26/25  1931 07/27/25  0507 07/28/25  0502   * 125* 123*   K 4.2 4.1 3.9   CL 91* 91* 89*   CO2 22 23 24   BUN 24* 28* 22*   CREATININE <0.2* <0.2* <0.2*   GLUCOSE 112* 99 100*     Lab Results   Component Value Date/Time    COLORU Yellow 07/26/2025 05:49 AM    NITRU POSITIVE 07/26/2025 05:49 AM    GLUCOSEU Negative 07/26/2025 05:49 AM    KETUA Negative 07/26/2025 05:49 AM    UROBILINOGEN 0.2 07/26/2025 05:49 AM    BILIRUBINUR Negative 07/26/2025 05:49 AM

## 2025-07-28 NOTE — PROGRESS NOTES
Comprehensive Nutrition Assessment    Type and Reason for Visit:  Wound, Initial    Nutrition Recommendations/Plan:   Continue clear liquid diet until medically appropriate to advance diet   Encourage and monitor po intake   Monitor weight      Malnutrition Assessment:  Malnutrition Status:  Insufficient data (unable to speak with pt d/t N/V) (07/28/25 1523)    Context:  Acute Illness     Findings of the 6 clinical characteristics of malnutrition:  Energy Intake:  Mild decrease in energy intake  Weight Loss:  Unable to assess (question accuracy on weight measurements on 5/29)     Body Fat Loss:  Unable to assess     Muscle Mass Loss:  Unable to assess    Fluid Accumulation:  Mild Extremities (RLE,LLE +2 pitting)   Strength:  Not Performed    Nutrition Assessment:    Patient presents to ED from nursing home with c/o bleeding in stool. Admitted for hyponatremia. Diet just advanced back to clear liquids. Colonoscopy cancelled for today; patient unable to complete bowel prep d/t abdominal pain and multiple loose BM overnight. Unable to speak to pateint d/t N/V. Encourage patient to increase fluid intake within fluid restriction to help prevent dehydration. Encourage patient to increase po intake as tolerated. Patient has been NPO or clear liquid diet for since admission 7/25; patient has not been meeting nutritional needs for past 3 days. Recommend diet advancement when medically appropriate d/t risk of malnutrition. Recommend Expedite Cup ONS after colonoscpy/diet advances tomorrow. Will continue to monitor.    Nutrition Related Findings:    Labs 7/28: Na 123(L), BUN 22(H), creatinine <0.2(L). Last BM7/27; diarrhea, bleeding. Wound Type: Pressure Injury, Stage IV (medial back)       Current Nutrition Intake & Therapies:       Average Supplements Intake: None Ordered  ADULT DIET; Clear Liquid; 1200 ml  Diet NPO    Anthropometric Measures:  Height: 162.6 cm (5' 4.02\")  Ideal Body Weight (IBW): 120 lbs (55 kg)

## 2025-07-28 NOTE — CARE COORDINATION
Housework, Shopping, Mobility    PT AM-PAC:   /24  OT AM-PAC:   /24    Family can provide assistance at DC: No  Would you like Case Management to discuss the discharge plan with any other family members/significant others, and if so, who? Yes (Pradeep - son)  Plans to Return to Present Housing: Unknown at present  Other Identified Issues/Barriers to RETURNING to current housing: None  Potential Assistance needed at discharge: N/A            Potential DME:    Patient expects to discharge to: Skilled nursing facility  Plan for transportation at discharge:      Financial    Payor: [employer] MEDICARE / Plan: [place] MEDICARE / Product Type: *No Product type* /     Does insurance require precert for SNF: Yes    Potential assistance Purchasing Medications: No  Meds-to-Beds request: Yes      [facility] [address] [place] [address] [address] [facility] -  [phone] - F [phone] [facility] [address]  Phone: [phone] Fax: [phone]      Notes:    Factors facilitating achievement of predicted outcomes: Caregiver support and Has needed Durable Medical Equipment at home    Barriers to discharge: Upper extremity weakness, Lower extremity weakness, Long standing deficits, and Medical complications    Additional Case Management Notes: Patient is from [facility] and plans to return at discharge.  Will need S transportation as patient reports being bedbound.  No other anticipated discharge needs at this time.    The Plan for Transition of Care is related to the following treatment goals of Acute abdominal pain [R10.9]  Hyponatremia [E87.1]  Rectal bleeding [K62.5]  Pressure injury of skin of sacral region, unspecified injury stage [L89.159]    IF APPLICABLE: The Patient and/or patient representative Radha and her family were provided with a choice of provider and agrees with the discharge plan. Freedom of choice list with basic dialogue that supports the patient's individualized  plan of care/goals and shares the quality data associated with the providers was provided to: Patient   Patient Representative Name:       The Patient and/or Patient Representative Agree with the Discharge Plan? Yes    Kinjal Mcnally  Case Management Department  Ph: [phone] Fax: [phone]

## 2025-07-29 ENCOUNTER — ANESTHESIA (OUTPATIENT)
Dept: ENDOSCOPY | Age: 80
End: 2025-07-29
Payer: MEDICARE

## 2025-07-29 ENCOUNTER — ANESTHESIA EVENT (OUTPATIENT)
Dept: ENDOSCOPY | Age: 80
End: 2025-07-29
Payer: MEDICARE

## 2025-07-29 LAB
ANION GAP SERPL CALCULATED.3IONS-SCNC: 12 MMOL/L (ref 3–16)
BACTERIA UR CULT: ABNORMAL
BACTERIA UR CULT: ABNORMAL
BASOPHILS # BLD: 0 K/UL (ref 0–0.2)
BASOPHILS NFR BLD: 0.3 %
BUN SERPL-MCNC: 9 MG/DL (ref 7–20)
CALCIUM SERPL-MCNC: 9 MG/DL (ref 8.3–10.6)
CHLORIDE SERPL-SCNC: 88 MMOL/L (ref 99–110)
CO2 SERPL-SCNC: 26 MMOL/L (ref 21–32)
CREAT SERPL-MCNC: <0.2 MG/DL (ref 0.6–1.2)
DEPRECATED RDW RBC AUTO: 15.9 % (ref 12.4–15.4)
EOSINOPHIL # BLD: 0 K/UL (ref 0–0.6)
EOSINOPHIL NFR BLD: 0.6 %
GFR SERPLBLD CREATININE-BSD FMLA CKD-EPI: >90 ML/MIN/{1.73_M2}
GLUCOSE SERPL-MCNC: 114 MG/DL (ref 70–99)
HCT VFR BLD AUTO: 23.8 % (ref 36–48)
HGB BLD-MCNC: 8.2 G/DL (ref 12–16)
LYMPHOCYTES # BLD: 0.9 K/UL (ref 1–5.1)
LYMPHOCYTES NFR BLD: 15.5 %
MAGNESIUM SERPL-MCNC: 1.66 MG/DL (ref 1.8–2.4)
MCH RBC QN AUTO: 31.1 PG (ref 26–34)
MCHC RBC AUTO-ENTMCNC: 34.6 G/DL (ref 31–36)
MCV RBC AUTO: 90 FL (ref 80–100)
MONOCYTES # BLD: 0.4 K/UL (ref 0–1.3)
MONOCYTES NFR BLD: 7.7 %
NEUTROPHILS # BLD: 4.4 K/UL (ref 1.7–7.7)
NEUTROPHILS NFR BLD: 75.9 %
ORGANISM: ABNORMAL
ORGANISM: ABNORMAL
PLATELET # BLD AUTO: 448 K/UL (ref 135–450)
PMV BLD AUTO: 7 FL (ref 5–10.5)
POTASSIUM SERPL-SCNC: 3 MMOL/L (ref 3.5–5.1)
RBC # BLD AUTO: 2.64 M/UL (ref 4–5.2)
SODIUM SERPL-SCNC: 122 MMOL/L (ref 136–145)
SODIUM SERPL-SCNC: 125 MMOL/L (ref 136–145)
SODIUM SERPL-SCNC: 126 MMOL/L (ref 136–145)
SODIUM SERPL-SCNC: 129 MMOL/L (ref 136–145)
WBC # BLD AUTO: 5.8 K/UL (ref 4–11)

## 2025-07-29 PROCEDURE — 36415 COLL VENOUS BLD VENIPUNCTURE: CPT

## 2025-07-29 PROCEDURE — 6370000000 HC RX 637 (ALT 250 FOR IP): Performed by: INTERNAL MEDICINE

## 2025-07-29 PROCEDURE — 84295 ASSAY OF SERUM SODIUM: CPT

## 2025-07-29 PROCEDURE — 1200000000 HC SEMI PRIVATE

## 2025-07-29 PROCEDURE — 80048 BASIC METABOLIC PNL TOTAL CA: CPT

## 2025-07-29 PROCEDURE — 0DB98ZX EXCISION OF DUODENUM, VIA NATURAL OR ARTIFICIAL OPENING ENDOSCOPIC, DIAGNOSTIC: ICD-10-PCS | Performed by: INTERNAL MEDICINE

## 2025-07-29 PROCEDURE — 83735 ASSAY OF MAGNESIUM: CPT

## 2025-07-29 PROCEDURE — 3700000000 HC ANESTHESIA ATTENDED CARE: Performed by: INTERNAL MEDICINE

## 2025-07-29 PROCEDURE — 7100000001 HC PACU RECOVERY - ADDTL 15 MIN: Performed by: INTERNAL MEDICINE

## 2025-07-29 PROCEDURE — 2580000003 HC RX 258: Performed by: INTERNAL MEDICINE

## 2025-07-29 PROCEDURE — 6370000000 HC RX 637 (ALT 250 FOR IP)

## 2025-07-29 PROCEDURE — 6360000002 HC RX W HCPCS

## 2025-07-29 PROCEDURE — 85025 COMPLETE CBC W/AUTO DIFF WBC: CPT

## 2025-07-29 PROCEDURE — 3700000001 HC ADD 15 MINUTES (ANESTHESIA): Performed by: INTERNAL MEDICINE

## 2025-07-29 PROCEDURE — 94760 N-INVAS EAR/PLS OXIMETRY 1: CPT

## 2025-07-29 PROCEDURE — 88305 TISSUE EXAM BY PATHOLOGIST: CPT

## 2025-07-29 PROCEDURE — 6360000002 HC RX W HCPCS: Performed by: INTERNAL MEDICINE

## 2025-07-29 PROCEDURE — 2580000003 HC RX 258: Performed by: NURSE ANESTHETIST, CERTIFIED REGISTERED

## 2025-07-29 PROCEDURE — 0DJD8ZZ INSPECTION OF LOWER INTESTINAL TRACT, VIA NATURAL OR ARTIFICIAL OPENING ENDOSCOPIC: ICD-10-PCS | Performed by: INTERNAL MEDICINE

## 2025-07-29 PROCEDURE — 2709999900 HC NON-CHARGEABLE SUPPLY: Performed by: INTERNAL MEDICINE

## 2025-07-29 PROCEDURE — 7100000000 HC PACU RECOVERY - FIRST 15 MIN: Performed by: INTERNAL MEDICINE

## 2025-07-29 PROCEDURE — 3609027000 HC COLONOSCOPY: Performed by: INTERNAL MEDICINE

## 2025-07-29 PROCEDURE — 6360000002 HC RX W HCPCS: Performed by: NURSE ANESTHETIST, CERTIFIED REGISTERED

## 2025-07-29 PROCEDURE — 2500000003 HC RX 250 WO HCPCS: Performed by: INTERNAL MEDICINE

## 2025-07-29 PROCEDURE — 3609012400 HC EGD TRANSORAL BIOPSY SINGLE/MULTIPLE: Performed by: INTERNAL MEDICINE

## 2025-07-29 RX ORDER — SODIUM CHLORIDE 9 MG/ML
INJECTION, SOLUTION INTRAVENOUS
Status: DISCONTINUED | OUTPATIENT
Start: 2025-07-29 | End: 2025-07-29 | Stop reason: SDUPTHER

## 2025-07-29 RX ORDER — POTASSIUM CHLORIDE 1500 MG/1
40 TABLET, EXTENDED RELEASE ORAL 2 TIMES DAILY
Status: COMPLETED | OUTPATIENT
Start: 2025-07-29 | End: 2025-07-30

## 2025-07-29 RX ORDER — GLYCOPYRROLATE 0.2 MG/ML
INJECTION INTRAMUSCULAR; INTRAVENOUS
Status: DISCONTINUED | OUTPATIENT
Start: 2025-07-29 | End: 2025-07-29 | Stop reason: SDUPTHER

## 2025-07-29 RX ORDER — MAGNESIUM SULFATE IN WATER 40 MG/ML
2000 INJECTION, SOLUTION INTRAVENOUS ONCE
Status: COMPLETED | OUTPATIENT
Start: 2025-07-29 | End: 2025-07-29

## 2025-07-29 RX ORDER — LIDOCAINE HYDROCHLORIDE 20 MG/ML
INJECTION, SOLUTION EPIDURAL; INFILTRATION; INTRACAUDAL; PERINEURAL
Status: DISCONTINUED | OUTPATIENT
Start: 2025-07-29 | End: 2025-07-29 | Stop reason: SDUPTHER

## 2025-07-29 RX ORDER — POLYETHYLENE GLYCOL 3350 17 G/17G
17 POWDER, FOR SOLUTION ORAL 2 TIMES DAILY
Status: DISCONTINUED | OUTPATIENT
Start: 2025-07-29 | End: 2025-07-29 | Stop reason: SDUPTHER

## 2025-07-29 RX ORDER — PROPOFOL 10 MG/ML
INJECTION, EMULSION INTRAVENOUS
Status: DISCONTINUED | OUTPATIENT
Start: 2025-07-29 | End: 2025-07-29 | Stop reason: SDUPTHER

## 2025-07-29 RX ORDER — PHENYLEPHRINE HCL IN 0.9% NACL 1 MG/10 ML
SYRINGE (ML) INTRAVENOUS
Status: DISCONTINUED | OUTPATIENT
Start: 2025-07-29 | End: 2025-07-29 | Stop reason: SDUPTHER

## 2025-07-29 RX ADMIN — MAGNESIUM SULFATE HEPTAHYDRATE 2000 MG: 40 INJECTION, SOLUTION INTRAVENOUS at 11:18

## 2025-07-29 RX ADMIN — LIDOCAINE HYDROCHLORIDE 100 MG: 20 INJECTION, SOLUTION EPIDURAL; INFILTRATION; INTRACAUDAL; PERINEURAL at 15:22

## 2025-07-29 RX ADMIN — Medication 100 MCG: at 15:58

## 2025-07-29 RX ADMIN — GABAPENTIN 100 MG: 100 CAPSULE ORAL at 13:41

## 2025-07-29 RX ADMIN — Medication 200 MCG: at 15:38

## 2025-07-29 RX ADMIN — GLYCOPYRROLATE 0.2 MG: 0.2 INJECTION INTRAMUSCULAR; INTRAVENOUS at 15:16

## 2025-07-29 RX ADMIN — IRON SUCROSE 200 MG: 20 INJECTION, SOLUTION INTRAVENOUS at 11:18

## 2025-07-29 RX ADMIN — PROPOFOL 150 MCG/KG/MIN: 10 INJECTION, EMULSION INTRAVENOUS at 15:22

## 2025-07-29 RX ADMIN — Medication 200 MCG: at 15:49

## 2025-07-29 RX ADMIN — POLYETHYLENE GLYCOL 3350 17 G: 17 POWDER, FOR SOLUTION ORAL at 08:50

## 2025-07-29 RX ADMIN — POLYETHYLENE GLYCOL-3350 AND ELECTROLYTES 2000 ML: 236; 6.74; 5.86; 2.97; 22.74 POWDER, FOR SOLUTION ORAL at 06:15

## 2025-07-29 RX ADMIN — Medication 100 MCG: at 16:02

## 2025-07-29 RX ADMIN — MORPHINE SULFATE 2 MG: 2 INJECTION, SOLUTION INTRAMUSCULAR; INTRAVENOUS at 08:53

## 2025-07-29 RX ADMIN — MORPHINE SULFATE 2 MG: 2 INJECTION, SOLUTION INTRAMUSCULAR; INTRAVENOUS at 17:22

## 2025-07-29 RX ADMIN — GABAPENTIN 100 MG: 100 CAPSULE ORAL at 08:50

## 2025-07-29 RX ADMIN — OXYCODONE AND ACETAMINOPHEN 1 TABLET: 5; 325 TABLET ORAL at 11:41

## 2025-07-29 RX ADMIN — MEROPENEM 1000 MG: 1 INJECTION INTRAVENOUS at 07:06

## 2025-07-29 RX ADMIN — SODIUM CHLORIDE: 9 INJECTION, SOLUTION INTRAVENOUS at 15:16

## 2025-07-29 RX ADMIN — POTASSIUM CHLORIDE 40 MEQ: 1500 TABLET, EXTENDED RELEASE ORAL at 11:18

## 2025-07-29 RX ADMIN — Medication 100 MCG: at 15:53

## 2025-07-29 RX ADMIN — Medication 3 MG: at 20:50

## 2025-07-29 RX ADMIN — SODIUM CHLORIDE, PRESERVATIVE FREE 10 ML: 5 INJECTION INTRAVENOUS at 20:58

## 2025-07-29 RX ADMIN — POTASSIUM CHLORIDE 40 MEQ: 1500 TABLET, EXTENDED RELEASE ORAL at 20:57

## 2025-07-29 RX ADMIN — AMIODARONE HYDROCHLORIDE 200 MG: 200 TABLET ORAL at 08:50

## 2025-07-29 RX ADMIN — OXYCODONE AND ACETAMINOPHEN 1 TABLET: 5; 325 TABLET ORAL at 21:10

## 2025-07-29 RX ADMIN — POLYETHYLENE GLYCOL 3350 17 G: 17 POWDER, FOR SOLUTION ORAL at 20:57

## 2025-07-29 RX ADMIN — MORPHINE SULFATE 2 MG: 2 INJECTION, SOLUTION INTRAMUSCULAR; INTRAVENOUS at 04:29

## 2025-07-29 RX ADMIN — TAMSULOSIN HYDROCHLORIDE 0.4 MG: 0.4 CAPSULE ORAL at 08:50

## 2025-07-29 RX ADMIN — GABAPENTIN 100 MG: 100 CAPSULE ORAL at 20:51

## 2025-07-29 ASSESSMENT — PAIN SCALES - GENERAL
PAINLEVEL_OUTOF10: 0
PAINLEVEL_OUTOF10: 10
PAINLEVEL_OUTOF10: 0
PAINLEVEL_OUTOF10: 9
PAINLEVEL_OUTOF10: 10
PAINLEVEL_OUTOF10: 0
PAINLEVEL_OUTOF10: 8
PAINLEVEL_OUTOF10: 0

## 2025-07-29 ASSESSMENT — PAIN DESCRIPTION - LOCATION
LOCATION: BACK;KNEE;ARM
LOCATION: BUTTOCKS;KNEE
LOCATION: KNEE
LOCATION: KNEE;LEG
LOCATION: KNEE

## 2025-07-29 ASSESSMENT — PAIN DESCRIPTION - ORIENTATION
ORIENTATION: RIGHT;LEFT
ORIENTATION: RIGHT;LEFT

## 2025-07-29 ASSESSMENT — PAIN DESCRIPTION - DESCRIPTORS
DESCRIPTORS: BURNING
DESCRIPTORS: THROBBING;DISCOMFORT

## 2025-07-29 ASSESSMENT — LIFESTYLE VARIABLES: SMOKING_STATUS: 0

## 2025-07-29 ASSESSMENT — PAIN - FUNCTIONAL ASSESSMENT: PAIN_FUNCTIONAL_ASSESSMENT: 0-10

## 2025-07-29 NOTE — ANESTHESIA PRE PROCEDURE
Use Topics    Alcohol use: Yes     Comment: occ                                Counseling given: Not Answered      Vital Signs (Current):   Vitals:    07/29/25 0923 07/29/25 1154 07/29/25 1211 07/29/25 1444   BP:  127/69  138/68   Pulse:  79  79   Resp: 16 14 16 15   Temp:  99.5 °F (37.5 °C)  98.4 °F (36.9 °C)   TempSrc:  Oral  Oral   SpO2:  95%  93%   Weight:       Height:                                                  BP Readings from Last 3 Encounters:   07/29/25 138/68   07/10/25 138/70   05/30/25 112/67       NPO Status: Time of last liquid consumption: 2359                        Time of last solid consumption: 2100                        Date of last liquid consumption: 07/29/25 (Go Lytely Bowel Prep)                        Date of last solid food consumption: 07/27/25    BMI:   Wt Readings from Last 3 Encounters:   07/29/25 65.6 kg (144 lb 10 oz)   07/10/25 66.7 kg (147 lb)   05/30/25 78.7 kg (173 lb 8 oz)     Body mass index is 24.81 kg/m².    CBC:   Lab Results   Component Value Date/Time    WBC 5.8 07/29/2025 05:25 AM    RBC 2.64 07/29/2025 05:25 AM    HGB 8.2 07/29/2025 05:25 AM    HCT 23.8 07/29/2025 05:25 AM    MCV 90.0 07/29/2025 05:25 AM    RDW 15.9 07/29/2025 05:25 AM     07/29/2025 05:25 AM       CMP:   Lab Results   Component Value Date/Time     07/29/2025 05:25 AM     07/29/2025 05:25 AM    K 3.0 07/29/2025 05:25 AM    CL 88 07/29/2025 05:25 AM    CO2 26 07/29/2025 05:25 AM    BUN 9 07/29/2025 05:25 AM    CREATININE <0.2 07/29/2025 05:25 AM    GFRAA >60 06/22/2017 05:00 PM    AGRATIO 1.3 05/19/2025 11:35 AM    LABGLOM >90 07/29/2025 05:25 AM    GLUCOSE 114 07/29/2025 05:25 AM    CALCIUM 9.0 07/29/2025 05:25 AM    BILITOT 0.3 05/19/2025 11:35 AM    ALKPHOS 80 05/19/2025 11:35 AM    AST 14 05/19/2025 11:35 AM    ALT 6 05/19/2025 11:35 AM       POC Tests: No results for input(s): \"POCGLU\", \"POCNA\", \"POCK\", \"POCCL\", \"POCBUN\", \"POCHEMO\", \"POCHCT\" in the last 72 hours.    Coags:

## 2025-07-29 NOTE — OP NOTE
Endoscopy Note    Patient: Radha Carcamo  : 1945  Acct#:     Procedure: Esophagogastroduodenoscopy with biopsy   Manual disimpaction of large stool impaction in the rectum   Colonoscopy with intubation of the terminal ileum    Date:  2025     Surgeon:  Kulwinder Agudelo MD    Indications: This is a 79 y.o. year old female who presents today with iron deficiency anemia for EGD and colonoscopy    Anesthesia:  TIVA    Description of Procedure:  Informed consent was obtained from the patient after explanation of indications, benefits and possible risks and complications of the procedure.  The patient was then taken to the endoscopy suite, placed in the left lateral decubitus position and the above IV sedation was administrered.    The Olympus videoendoscope was placed in the patient's mouth and under direct visualization passed into the esophagus. The scope was then advanced to the 2nd portion of the duodenum without difficulty. Views were good, patient toleration was good.  Retroflexion was performed in the stomach.      Findings:  1.  The esophagus appeared normal without evidence of Samuel's esophagus or reflux esophagitis.  2.  Small sliding hiatal hernia  3.  Normal stomach.  4.  Normal duodenum.  The villous pattern appeared normal, but given symptoms, multiple small bowel biopsies were obtained to evaluate for celiac disease.      The scope was then withdrawn back into the stomach, it was decompressed, and the scope was completely withdrawn.    A digital rectal examination was performed and revealed a very large hard fecal impaction that was manually disimpacted.      The Olympus pediatric video colonoscope was placed in the patient's rectum under digital direction and advanced to the cecum. The cecum was identified by characteristic anatomy and ballottment.  The prep was good above the impaction.  I was able to manually disimpact the impaction and lavage out the rectal area and get good views   The ileocecal valve was identified and intubated.  The scope was then withdrawn back through the cecum, ascending, transverse, descending and sigmoid colons.  Careful circumferential examination of the mucosa in these areas was performed. The scope was then withdrawn into the rectum and retroflexed.  The scope was straightened, the colon was decompressed and the scope was withdrawn from the patient.      Findings:  Ileum:  normal  Colon:  Mild diverticulosis in the left colon.  Mild stercoral proctitis without ulcer or bleeding lesion  Retroflexed view of the rectum: Grade 1 internal hemorrhoids    The patient tolerated the procedure well and was taken to Recovery in good condition.  No complications.    EBL: minimal  Specimens taken: yes    Impression:    1. Small sliding hiatal hernia.  Otherwise normal EGD.  2. Large fecal impaction in rectum manually disimpacted.  3.  Mild diverticulosis left colon.      79 y.o. female with chronic hyponatremia, atrial fibrillation on Eliquis and amiodarone, chronic sacral decubitus ulcers, history of PE, presenting with hyponatremia.  According to HPI patient had blood in her diaper and was sent to the ER for further evaluation. She has never previously had a colonoscopy because her sister told her that they hurt.  She is willing to have one if she gets sedated. Sodium noted to be 120 at admission though chronically around 130 and she gets salt tabs at her nursing facility.  She is on Eliquis which has been held as of yesterday.  CT was notable for very large stool burden and lactulose was prescribed but she has declined it.  CTA on admission negative for acute process.  1.         Chronic iron deficiency anemia with %sat of 8.  In the setting of blood found in the patient's diaper. Prior to May 2025 hemoglobin was up around 13. On IV iron.  Eliquis on hold.  2.         Acute on chronic hyponatremia.  Her sodium is 120 at this time.  She states she is on salt tabs at her

## 2025-07-29 NOTE — PROGRESS NOTES
V2.0    [facility] [place] [facility] Progress Note      Name:  Radha Carcamo /Age/Sex: 1945  (79 y.o. female)   MRN & CSN:  [id number] & [id number] Encounter Date/Time: 2025 5:35 PM EDT   Location:  [id number] PCP: Madai Morocho APRN - NP     Attending:Batool Chandra MD       Hospital Day: 5    Assessment and Recommendations   Radha Carcamo is a 79 y.o. female who presents with Hyponatremia      Plan:       Hyponatremia  Hypochloremia  - Na 121 on admit  - nephrology consulted  - started on IVF NS at 100/hr-->dced  - monitor Na  - on ureNa  - received tolvaptan  - ureNa held   - Na 126     Acute GI bleed  Hematochezia  Constipation  Poss stercoral ulcer/colitis  Iron def anemia  - hb dropped from 9.3-->8.9--:8.2-->8.0  - monitor H&H  - GI consulted  - hold eliquis  - started on miralax  - GI ordered golytely, plan colonoscopy today   - cont IV iron  - cont abx, add flagyl     UTI  - UA reviewed  - await urine cx with ESBL e coli and klebsiella  - switch rocephin to merrem #2  - will consult ID     Sacral wound  - wound care consulted     Review and resume home meds         Diet ADULT DIET; Regular   DVT Prophylaxis [x] Lovenox, []  Heparin, [] SCDs, [] Ambulation,  [] Eliquis, [] Xarelto  [] Coumadin   Code Status Full Code             Personally reviewed Lab Studies and Imaging     Subjective:     Drinking golytely, Na improved    Review of Systems:      Pertinent positives and negatives discussed in HPI    Objective:     Intake/Output Summary (Last 24 hours) at 2025 1735  Last data filed at 2025 1559  Gross per 24 hour   Intake 1840 ml   Output 2125 ml   Net -285 ml      Vitals:   Vitals:    25 1625 25 1630 25 1635 25 1657   BP: (!) 101/54 121/67 122/63 (!) 128/59   Pulse: 70 77 77 72   Resp: 15 15 15 16   Temp:  96.8 °F (36 °C)  97.7 °F (36.5 °C)   TempSrc:  Temporal  Oral   SpO2: 92% 94% 91% 98%   Weight:       Height:             Physical Exam:        General:  abdomen or pelvis.  No bowel containing hernia. Bones/Soft Tissues: No acute osseous abnormality.  Diffuse body wall edema. CTA PELVIS: Aorta/Iliacs: Bilateral iliac and visualized femoral arteries are patent. Other: Air is present in the bladder lumen.  Mild circumferential bladder wall thickening. Bones/Soft Tissues: No acute osseous abnormality.  Postsurgical changes of prior total left hip arthroplasty without evidence of complication.     Evaluation somewhat limited by diffuse beam hardening and quantum mottle artifact. 1.  No CT findings of an active gastrointestinal hemorrhage. 2.  Very large stool burden throughout the colon and rectum.  Wall thickening of the rectum may relate to stercoral proctitis.  Recommend correlation with findings of constipation. 3.  Colonic diverticulosis. 4.  Mild circumferential bladder wall thickening with air in the bladder lumen that may relate to recent instrumentation or cystitis.     XR CHEST PORTABLE  Result Date: 7/25/2025  EXAMINATION: ONE XRAY VIEW OF THE CHEST 7/25/2025 4:10 pm COMPARISON: None. HISTORY: ORDERING SYSTEM PROVIDED HISTORY: edema TECHNOLOGIST PROVIDED HISTORY: Reason for exam:->edema FINDINGS: No focal consolidation, pleural effusion or pneumothorax.  The cardiomediastinal silhouette is unremarkable.  No overt pulmonary edema.  No acute osseous abnormality.     No acute cardiopulmonary process.       CBC:   Recent Labs     07/28/25  0759 07/28/25  2037 07/29/25  0525   WBC 6.2  --  5.8   HGB 8.0*  7.8* 8.4* 8.2*   *  --  448     BMP:    Recent Labs     07/27/25  0507 07/28/25  0502 07/28/25  1644 07/29/25  0012 07/29/25  0525 07/29/25  1703   * 123*   < > 122* 126*  125* 129*   K 4.1 3.9  --   --  3.0*  --    CL 91* 89*  --   --  88*  --    CO2 23 24  --   --  26  --    BUN 28* 22*  --   --  9  --    CREATININE <0.2* <0.2*  --   --  <0.2*  --    GLUCOSE 99 100*  --   --  114*  --     < > = values in this interval not displayed.     Hepatic:

## 2025-07-29 NOTE — H&P
Pre-operative History and Physical    Patient: Radha Carcamo  : 1945  Acct#:     HISTORY OF PRESENT ILLNESS:    The patient is a 79 y.o. female who presents with iron deficiency anemia for EGD and colonoscopy.        Past Medical History:        Diagnosis Date    Spinal stenosis       Past Surgical History:        Procedure Laterality Date    EYE SURGERY      cataract ou    RECTAL SURGERY N/A 2025    EXCISIONAL DEBRIDEMENT SACRAL ULCER performed by Milo Champion MD at [facility] OR      Medications Prior to Admission:   No current facility-administered medications on file prior to encounter.     Current Outpatient Medications on File Prior to Encounter   Medication Sig Dispense Refill    calcium carbonate (OSCAL) 500 MG TABS tablet Take 1 tablet by mouth daily      sodium chloride 1 g tablet Take 1 tablet by mouth in the morning and 1 tablet in the evening.      urea (URE-NA) 15 g PACK packet Take 15 g by mouth daily For prophylaxis      bisacodyl (DULCOLAX) 10 MG suppository Place 1 suppository rectally daily as needed for Constipation      vitamin D (ERGOCALCIFEROL) 1.25 MG (67232 UT) CAPS capsule Take 1 capsule by mouth once a week      ferrous sulfate (IRON 325) 325 (65 Fe) MG tablet Take 1 tablet by mouth daily (with breakfast)      sodium phosphate (FLEET) Place 1 enema rectally once as needed (constipation)      loperamide (IMODIUM) 2 MG capsule Take 1 capsule by mouth as needed for Diarrhea      magnesium hydroxide (MILK OF MAGNESIA) 400 MG/5ML suspension Take 5 mLs by mouth daily as needed for Constipation      oxyCODONE-acetaminophen (PERCOCET) 5-325 MG per tablet Take 1 tablet by mouth every 6 hours as needed for Pain. Max Daily Amount: 4 tablets      AMINO ACIDS PO Take 30 mLs by mouth in the morning and 30 mLs in the evening. For protein supplement.      furosemide (LASIX) 20 MG tablet Take 1 tablet by mouth daily (Patient not taking: Reported on 2025) 90 tablet 1    apixaban

## 2025-07-29 NOTE — PROGRESS NOTES
4 Eyes Skin Assessment     NAME:  Radha Carcamo  YOB: 1945  MEDICAL RECORD NUMBER:  [id number]    The patient is being assessed for  Post-Op Surgical    I agree that at least one RN has performed a thorough Head to Toe Skin Assessment on the patient. ALL assessment sites listed below have been assessed.      Areas assessed by both nurses:    Head, Face, Ears, Shoulders, Back, Chest, Arms, Elbows, Hands, Sacrum. Buttock, Coccyx, Ischium, and Legs. Feet and Heels        Does the Patient have a Wound? No noted wound(s)       Dao Prevention initiated by RN: No  Wound Care Orders initiated by RN: No    For hospital-acquired stage 1 & 2 and ALL Stage 3,4, Unstageable, DTI, NWPT, and Complex wounds: place order “IP Wound Care/Ostomy Nurse Eval and Treat” by RN under : No    New Ostomies, if present place, Ostomy referral order under : No     Nurse 1 eSignature: Electronically signed by ALIA DOMINGUEZ RN on 7/29/25 at 5:41 PM EDT    **SHARE this note so that the co-signing nurse can place an eSignature**    Nurse 2 eSignature: Electronically signed by Ting Anaya RN on 7/29/25 at 5:42 PM EDT

## 2025-07-29 NOTE — PROGRESS NOTES
Pt consumed 1440 mL of Go Lytely at this time. Pt tolerating well. Stool remains watery, brown, and bloody.

## 2025-07-29 NOTE — PLAN OF CARE
Problem: Discharge Planning  Goal: Discharge to home or other facility with appropriate resources  7/29/2025 1428 by Annalise Roe RN  Outcome: Progressing  7/29/2025 0252 by Nancy Humphreys RN  Outcome: Progressing     Problem: Pain  Goal: Verbalizes/displays adequate comfort level or baseline comfort level  7/29/2025 1428 by Annalise Roe RN  Outcome: Progressing  7/29/2025 0252 by Nancy Humphreys RN  Outcome: Progressing     Problem: Skin/Tissue Integrity  Goal: Skin integrity remains intact  Description: 1.  Monitor for areas of redness and/or skin breakdown  2.  Assess vascular access sites hourly  3.  Every 4-6 hours minimum:  Change oxygen saturation probe site  4.  Every 4-6 hours:  If on nasal continuous positive airway pressure, respiratory therapy assess nares and determine need for appliance change or resting period  7/29/2025 1428 by Annalise Roe RN  Outcome: Progressing  7/29/2025 0252 by Nancy Humphreys RN  Outcome: Progressing     Problem: Safety - Adult  Goal: Free from fall injury  7/29/2025 1428 by Annalise Roe RN  Outcome: Progressing  7/29/2025 0252 by Nancy Humphreys RN  Outcome: Progressing     Problem: ABCDS Injury Assessment  Goal: Absence of physical injury  7/29/2025 1428 by Annalise Roe RN  Outcome: Progressing  7/29/2025 0252 by Nancy Humphreys RN  Outcome: Progressing     Problem: Neurosensory - Adult  Goal: Achieves stable or improved neurological status  7/29/2025 1428 by Annalise Roe RN  Outcome: Progressing  7/29/2025 0252 by Nancy Humphreys RN  Outcome: Progressing  Goal: Achieves maximal functionality and self care  7/29/2025 1428 by Annalise Roe RN  Outcome: Progressing  7/29/2025 0252 by Nancy Humphreys RN  Outcome: Progressing     Problem: Respiratory - Adult  Goal: Achieves optimal ventilation and oxygenation  7/29/2025 1428 by  Annalise Roe RN  Outcome: Progressing  7/29/2025 0252 by Nancy Humphreys RN  Outcome: Progressing     Problem: Cardiovascular - Adult  Goal: Maintains optimal cardiac output and hemodynamic stability  7/29/2025 1428 by Annalise Roe RN  Outcome: Progressing  7/29/2025 0252 by Nancy Humphreys RN  Outcome: Progressing  Goal: Absence of cardiac dysrhythmias or at baseline  7/29/2025 1428 by Annalise Roe RN  Outcome: Progressing  7/29/2025 0252 by Nancy Humphreys RN  Outcome: Progressing     Problem: Skin/Tissue Integrity - Adult  Goal: Skin integrity remains intact  Description: 1.  Monitor for areas of redness and/or skin breakdown  2.  Assess vascular access sites hourly  3.  Every 4-6 hours minimum:  Change oxygen saturation probe site  4.  Every 4-6 hours:  If on nasal continuous positive airway pressure, respiratory therapy assess nares and determine need for appliance change or resting period  7/29/2025 1428 by Annalise Roe RN  Outcome: Progressing  7/29/2025 0252 by Nancy Humphreys RN  Outcome: Progressing  Goal: Incisions, wounds, or drain sites healing without S/S of infection  7/29/2025 1428 by Annalise Roe RN  Outcome: Progressing  7/29/2025 0252 by Nancy Humphreys RN  Outcome: Progressing  Goal: Oral mucous membranes remain intact  7/29/2025 1428 by Annalise Roe RN  Outcome: Progressing  7/29/2025 0252 by Nancy Humphreys RN  Outcome: Progressing     Problem: Musculoskeletal - Adult  Goal: Return mobility to safest level of function  7/29/2025 1428 by Annalise Roe RN  Outcome: Progressing  7/29/2025 0252 by Nancy Humphreys RN  Outcome: Progressing  Goal: Maintain proper alignment of affected body part  7/29/2025 1428 by Annalise Roe RN  Outcome: Progressing  7/29/2025 0252 by Humphreys, Lukesha Elyssa, RN  Outcome: Progressing  Goal: Return ADL status to  by Nancy Humphreys RN  Outcome: Progressing  Goal: Hemodynamic stability and optimal renal function maintained  7/29/2025 1428 by Annalise Roe RN  Outcome: Progressing  7/29/2025 0252 by Nancy Humphreys RN  Outcome: Progressing  Goal: Glucose maintained within prescribed range  7/29/2025 1428 by Annalise Roe RN  Outcome: Progressing  7/29/2025 0252 by Nancy Humphreys RN  Outcome: Progressing     Problem: Hematologic - Adult  Goal: Maintains hematologic stability  7/29/2025 1428 by Annalise Roe RN  Outcome: Progressing  7/29/2025 0252 by Nancy Humphreys RN  Outcome: Progressing     Problem: Anxiety  Goal: Will report anxiety at manageable levels  Description: INTERVENTIONS:  1. Administer medication as ordered  2. Teach and rehearse alternative coping skills  3. Provide emotional support with 1:1 interaction with staff  7/29/2025 1428 by Annalise Roe RN  Outcome: Progressing  7/29/2025 0252 by Nancy Humphreys RN  Outcome: Progressing     Problem: Confusion  Goal: Confusion, delirium, dementia, or psychosis is improved or at baseline  Description: INTERVENTIONS:  1. Assess for possible contributors to thought disturbance, including medications, impaired vision or hearing, underlying metabolic abnormalities, dehydration, psychiatric diagnoses, and notify attending LIP  2. [place] high risk fall precautions, as indicated  3. Provide frequent short contacts to provide reality reorientation, refocusing and direction  4. Decrease environmental stimuli, including noise as appropriate  5. Monitor and intervene to maintain adequate nutrition, hydration, elimination, sleep and activity  6. If unable to ensure safety without constant attention obtain sitter and review sitter guidelines with assigned personnel  7. Initiate Psychosocial CNS and Spiritual Care consult, as indicated  7/29/2025 1428 by Annalise Roe RN  Outcome:

## 2025-07-29 NOTE — PLAN OF CARE
Problem: Discharge Planning  Goal: Discharge to home or other facility with appropriate resources  Outcome: Progressing     Problem: Pain  Goal: Verbalizes/displays adequate comfort level or baseline comfort level  Outcome: Progressing     Problem: Skin/Tissue Integrity  Goal: Skin integrity remains intact  Description: 1.  Monitor for areas of redness and/or skin breakdown  2.  Assess vascular access sites hourly  3.  Every 4-6 hours minimum:  Change oxygen saturation probe site  4.  Every 4-6 hours:  If on nasal continuous positive airway pressure, respiratory therapy assess nares and determine need for appliance change or resting period  Outcome: Progressing     Problem: Safety - Adult  Goal: Free from fall injury  Outcome: Progressing     Problem: ABCDS Injury Assessment  Goal: Absence of physical injury  Outcome: Progressing     Problem: Neurosensory - Adult  Goal: Achieves stable or improved neurological status  Outcome: Progressing  Goal: Achieves maximal functionality and self care  Outcome: Progressing     Problem: Respiratory - Adult  Goal: Achieves optimal ventilation and oxygenation  Outcome: Progressing     Problem: Cardiovascular - Adult  Goal: Maintains optimal cardiac output and hemodynamic stability  Outcome: Progressing  Goal: Absence of cardiac dysrhythmias or at baseline  Outcome: Progressing     Problem: Skin/Tissue Integrity - Adult  Goal: Skin integrity remains intact  Description: 1.  Monitor for areas of redness and/or skin breakdown  2.  Assess vascular access sites hourly  3.  Every 4-6 hours minimum:  Change oxygen saturation probe site  4.  Every 4-6 hours:  If on nasal continuous positive airway pressure, respiratory therapy assess nares and determine need for appliance change or resting period  Outcome: Progressing  Goal: Incisions, wounds, or drain sites healing without S/S of infection  Outcome: Progressing  Goal: Oral mucous membranes remain intact  Outcome: Progressing

## 2025-07-29 NOTE — PROGRESS NOTES
Patient ID: Radha Carcamo  Referring/ Physician: Batool Chandra MD      Summary:   Radha Carcamo is being seen by nephrology for hyponatremia .     Reason for admission: presented with blood in stool. Found to have very large stool burden throughout the colon and rectum.  Wall thickening  of the rectum may relate to stercoral proctitis.    Hyponatremia noted on admission, asymptomatic with Na 121 initially.       Interval Hx:     No new issues.   Got potassium replacement.     UO 1875  Negative 315 cc    Na 125 > 123 > 121c > 122 > 126   Cr < 0.2   BUN 9    URINE   > 521  URINE SODIUM 24 > 23        Assessment/Plan:   - has long standing hyponatremia, SIADH  - s/p  tolvaptan 7.5 mg, Na up to 126 now.   - hold off on further tolvaptan. She is prepping for colonoscopy  - replace potassium       Recurrent Hhyponatremia.  In the past did not respond to tolvaptan as per my partner's note   Sodium 121.  124--129 and 5/28/2025.  130  2014  UA specific gravity 1032  Echo 4/2025 normal EF  Albumin 2.6 in the past normal LFTs     Acute GI bleeding  #Bright red blood per rectum  - Hemoglobin 9.3,   CTA of abdominal pelvic with contrast no CT findings of acute GI hemorrhage.      #Constipation  - CT of abdominal pelvic with contrast, Very large stool burden throughout the colon and rectum may relate to stercoral proctitis.  - Will provide 1 dose of lactulose and continue home regimen     #Sacral wound currently not infected  #Chronic back pain secondary to above  - Will consult wound care and continue home medication for pain     #Paroxysmal atrial fibrillation   #Bilateral lower extremity swelling  -Stable currently will hold Lasix due to hyponatremia.  Will hold Eliquis due to GI bleeding  -2D echo 5/19/2025: Echo with preserved LVEF, disease  - Continue amiodarone and metoprolo              [facility] would like to thank you for the opportunity to serve this patient. Please

## 2025-07-29 NOTE — PROGRESS NOTES
700 mL of go Lytely consumed. Pt tolerating well. Total of 6 diaper changes at this time. Pt complains of pain at a 9 out of 10 even after receiving pain medications. This RN educated pt on pain medications. Pt call light within reach.

## 2025-07-29 NOTE — PROGRESS NOTES
Pt returned from pacu. Pt requesting pain medication states pain 7/10. Mar protocols followed. Call light within reach.

## 2025-07-29 NOTE — ANESTHESIA POSTPROCEDURE EVALUATION
Department of Anesthesiology  Postprocedure Note    Patient: Radha Carcamo  MRN: [id number]  YOB: 1945  Date of evaluation: 7/29/2025    Procedure Summary       Date: 07/29/25 Room / Location: [place] [id number] / [facility]    Anesthesia Start: 1516 Anesthesia Stop: 1602    Procedures:       ESOPHAGOGASTRODUODENOSCOPY BIOPSY      COLONOSCOPY Diagnosis:       Iron deficiency anemia, unspecified iron deficiency anemia type      (Iron deficiency anemia, unspecified iron deficiency anemia type [D50.9])    Surgeons: Kulwinder Agudelo MD Responsible Provider: Arnaldo Long MD    Anesthesia Type: MAC ASA Status: 3            Anesthesia Type: MAC    Carlos Phase I: Carlos Score: 10    Cralos Phase II:      Anesthesia Post Evaluation    Patient location during evaluation: PACU  Patient participation: complete - patient participated  Level of consciousness: awake and alert  Pain score: 0  Airway patency: patent  Nausea & Vomiting: no nausea and no vomiting  Cardiovascular status: blood pressure returned to baseline  Respiratory status: acceptable  Hydration status: euvolemic  Pain management: adequate    No notable events documented.

## 2025-07-29 NOTE — PROGRESS NOTES
Pt complaining of pain. Initially pt was completely covered in feces. Have changed pt consistently since arriving for shift. Pt is continuously voiding. Worked with wound care and continuously changing stage 4 dressing with changes.

## 2025-07-30 LAB
BASOPHILS # BLD: 0 K/UL (ref 0–0.2)
BASOPHILS NFR BLD: 0.3 %
DEPRECATED RDW RBC AUTO: 16.1 % (ref 12.4–15.4)
EOSINOPHIL # BLD: 0.1 K/UL (ref 0–0.6)
EOSINOPHIL NFR BLD: 1.4 %
HCT VFR BLD AUTO: 21.4 % (ref 36–48)
HCT VFR BLD AUTO: 23.8 % (ref 36–48)
HGB BLD-MCNC: 7.2 G/DL (ref 12–16)
HGB BLD-MCNC: 8.3 G/DL (ref 12–16)
LYMPHOCYTES # BLD: 1.2 K/UL (ref 1–5.1)
LYMPHOCYTES NFR BLD: 22.8 %
MCH RBC QN AUTO: 31 PG (ref 26–34)
MCHC RBC AUTO-ENTMCNC: 33.4 G/DL (ref 31–36)
MCV RBC AUTO: 92.7 FL (ref 80–100)
MONOCYTES # BLD: 0.5 K/UL (ref 0–1.3)
MONOCYTES NFR BLD: 9.2 %
NEUTROPHILS # BLD: 3.4 K/UL (ref 1.7–7.7)
NEUTROPHILS NFR BLD: 66.3 %
PLATELET # BLD AUTO: 401 K/UL (ref 135–450)
PMV BLD AUTO: 6.9 FL (ref 5–10.5)
RBC # BLD AUTO: 2.31 M/UL (ref 4–5.2)
SODIUM SERPL-SCNC: 126 MMOL/L (ref 136–145)
SODIUM SERPL-SCNC: 127 MMOL/L (ref 136–145)
WBC # BLD AUTO: 5.2 K/UL (ref 4–11)

## 2025-07-30 PROCEDURE — 94760 N-INVAS EAR/PLS OXIMETRY 1: CPT

## 2025-07-30 PROCEDURE — 6360000002 HC RX W HCPCS: Performed by: INTERNAL MEDICINE

## 2025-07-30 PROCEDURE — 51798 US URINE CAPACITY MEASURE: CPT

## 2025-07-30 PROCEDURE — 85025 COMPLETE CBC W/AUTO DIFF WBC: CPT

## 2025-07-30 PROCEDURE — 6370000000 HC RX 637 (ALT 250 FOR IP): Performed by: INTERNAL MEDICINE

## 2025-07-30 PROCEDURE — 85014 HEMATOCRIT: CPT

## 2025-07-30 PROCEDURE — 85018 HEMOGLOBIN: CPT

## 2025-07-30 PROCEDURE — 84295 ASSAY OF SERUM SODIUM: CPT

## 2025-07-30 PROCEDURE — 2500000003 HC RX 250 WO HCPCS: Performed by: INTERNAL MEDICINE

## 2025-07-30 PROCEDURE — 2580000003 HC RX 258: Performed by: INTERNAL MEDICINE

## 2025-07-30 PROCEDURE — 36415 COLL VENOUS BLD VENIPUNCTURE: CPT

## 2025-07-30 PROCEDURE — 1200000000 HC SEMI PRIVATE

## 2025-07-30 PROCEDURE — 6370000000 HC RX 637 (ALT 250 FOR IP): Performed by: NURSE PRACTITIONER

## 2025-07-30 RX ORDER — MIDODRINE HYDROCHLORIDE 10 MG/1
10 TABLET ORAL ONCE
Status: COMPLETED | OUTPATIENT
Start: 2025-07-30 | End: 2025-07-30

## 2025-07-30 RX ORDER — HYDROCORTISONE ACETATE 25 MG/1
25 SUPPOSITORY RECTAL 2 TIMES DAILY
Status: DISCONTINUED | OUTPATIENT
Start: 2025-07-30 | End: 2025-08-01 | Stop reason: HOSPADM

## 2025-07-30 RX ADMIN — SODIUM CHLORIDE, PRESERVATIVE FREE 10 ML: 5 INJECTION INTRAVENOUS at 22:52

## 2025-07-30 RX ADMIN — MEROPENEM 1000 MG: 1 INJECTION INTRAVENOUS at 07:05

## 2025-07-30 RX ADMIN — Medication 3 MG: at 22:30

## 2025-07-30 RX ADMIN — GABAPENTIN 100 MG: 100 CAPSULE ORAL at 22:30

## 2025-07-30 RX ADMIN — POLYETHYLENE GLYCOL 3350 17 G: 17 POWDER, FOR SOLUTION ORAL at 22:31

## 2025-07-30 RX ADMIN — MORPHINE SULFATE 2 MG: 2 INJECTION, SOLUTION INTRAMUSCULAR; INTRAVENOUS at 13:49

## 2025-07-30 RX ADMIN — GABAPENTIN 100 MG: 100 CAPSULE ORAL at 13:49

## 2025-07-30 RX ADMIN — MEROPENEM 1000 MG: 1 INJECTION INTRAVENOUS at 22:48

## 2025-07-30 RX ADMIN — MORPHINE SULFATE 2 MG: 2 INJECTION, SOLUTION INTRAMUSCULAR; INTRAVENOUS at 09:04

## 2025-07-30 RX ADMIN — MEROPENEM 1000 MG: 1 INJECTION INTRAVENOUS at 15:39

## 2025-07-30 RX ADMIN — TAMSULOSIN HYDROCHLORIDE 0.4 MG: 0.4 CAPSULE ORAL at 09:04

## 2025-07-30 RX ADMIN — MORPHINE SULFATE 2 MG: 2 INJECTION, SOLUTION INTRAMUSCULAR; INTRAVENOUS at 19:36

## 2025-07-30 RX ADMIN — GABAPENTIN 100 MG: 100 CAPSULE ORAL at 09:04

## 2025-07-30 RX ADMIN — POTASSIUM CHLORIDE 40 MEQ: 1500 TABLET, EXTENDED RELEASE ORAL at 09:04

## 2025-07-30 RX ADMIN — SODIUM CHLORIDE, PRESERVATIVE FREE 10 ML: 5 INJECTION INTRAVENOUS at 09:05

## 2025-07-30 RX ADMIN — ACETAMINOPHEN 650 MG: 650 SUPPOSITORY RECTAL at 01:00

## 2025-07-30 RX ADMIN — POLYETHYLENE GLYCOL 3350 17 G: 17 POWDER, FOR SOLUTION ORAL at 09:05

## 2025-07-30 RX ADMIN — AMIODARONE HYDROCHLORIDE 200 MG: 200 TABLET ORAL at 09:05

## 2025-07-30 RX ADMIN — MIDODRINE HYDROCHLORIDE 10 MG: 10 TABLET ORAL at 01:50

## 2025-07-30 RX ADMIN — MEROPENEM 1000 MG: 1 INJECTION INTRAVENOUS at 00:07

## 2025-07-30 ASSESSMENT — PAIN SCALES - GENERAL
PAINLEVEL_OUTOF10: 8
PAINLEVEL_OUTOF10: 10
PAINLEVEL_OUTOF10: 0
PAINLEVEL_OUTOF10: 10
PAINLEVEL_OUTOF10: 3
PAINLEVEL_OUTOF10: 10
PAINLEVEL_OUTOF10: 4
PAINLEVEL_OUTOF10: 10
PAINLEVEL_OUTOF10: 9

## 2025-07-30 ASSESSMENT — PAIN DESCRIPTION - ORIENTATION
ORIENTATION: RIGHT;LEFT
ORIENTATION: RIGHT;LEFT;ANTERIOR
ORIENTATION: RIGHT;LEFT
ORIENTATION: RIGHT;LEFT
ORIENTATION: RIGHT;LEFT;ANTERIOR

## 2025-07-30 ASSESSMENT — PAIN DESCRIPTION - LOCATION
LOCATION: KNEE

## 2025-07-30 ASSESSMENT — PAIN DESCRIPTION - DESCRIPTORS
DESCRIPTORS: ACHING
DESCRIPTORS: ACHING
DESCRIPTORS: DISCOMFORT
DESCRIPTORS: THROBBING
DESCRIPTORS: BURNING;DISCOMFORT
DESCRIPTORS: ACHING

## 2025-07-30 NOTE — PROGRESS NOTES
V2.0    [facility] [place]      Name:  Radha Carcamo /Age/Sex: 1945  (79 y.o. female)   MRN & CSN:  [id number] & [id number] Encounter Date/Time: 2025 4:11 PM EDT   Location:  [id number] PCP: Madai Morocho APRN - NP     Attending:Batool Chandra MD       Hospital Day: 6    Assessment and Recommendations   Radha Carcamo is a 79 y.o. female who presents with Hyponatremia      Plan:       Hyponatremia  Hypochloremia  - Na 121 on admit  - nephrology consulted  - started on IVF NS at 100/hr-->dced  - monitor Na  - on ureNa  - received tolvaptan  - ureNa held   - Na 126-->129     Acute GI bleed  Hematochezia  Constipation  Poss stercoral ulcer/colitis  Iron def anemia  - hb dropped from 9.3-->8.9-->8.2-->8.0-->7.2-->8.3  - monitor H&H  - GI consulted  - held eliquis-->resume per GI  - started on miralax  - GI ordered golytely, s/p colonoscopy   - cont IV iron  - cont abx     UTI  - UA reviewed  - await urine cx with ESBL e coli and klebsiella  - switch rocephin to merrem #3  - will consult ID     Sacral wound  - wound care consulted     Review and resume home meds         Diet ADULT DIET; Regular; 1500 ml  ADULT ORAL NUTRITION SUPPLEMENT; Breakfast, Lunch, Dinner; Standard High Calorie/High Protein Oral Supplement   DVT Prophylaxis [] Lovenox, []  Heparin, [] SCDs, [] Ambulation,  [x] Eliquis, [] Xarelto  [] Coumadin   Code Status Full Code             Personally reviewed Lab Studies and Imaging       Subjective:     No acute events overnight, Na improved, had colonoscopy yesterday    Review of Systems:      Pertinent positives and negatives discussed in HPI    Objective:     Intake/Output Summary (Last 24 hours) at 2025 1611  Last data filed at 2025 1541  Gross per 24 hour   Intake 1416.01 ml   Output 1550 ml   Net -133.99 ml      Vitals:   Vitals:    25 0755 25 0934 25 1159 25 1419   BP: 124/80  120/69    Pulse: 73  81    Resp: 15 16 17 17  --    CL 89*  --  88*  --   --   --    CO2 24  --  26  --   --   --    BUN 22*  --  9  --   --   --    CREATININE <0.2*  --  <0.2*  --   --   --    GLUCOSE 100*  --  114*  --   --   --     < > = values in this interval not displayed.     Hepatic: No results for input(s): \"AST\", \"ALT\", \"BILITOT\", \"ALKPHOS\" in the last 72 hours.    Invalid input(s): \"ALB\"  Lipids:   Lab Results   Component Value Date/Time    CHOL 200 06/22/2017 05:00 PM    HDL 51 06/22/2017 05:00 PM    TRIG 135 06/22/2017 05:00 PM     Hemoglobin A1C:   Lab Results   Component Value Date/Time    LABA1C 5.5 06/22/2017 05:00 PM     TSH:   Lab Results   Component Value Date/Time    TSH 3.42 05/22/2025 06:35 AM     Troponin: No results found for: \"TROPONINT\"  Lactic Acid: No results for input(s): \"LACTA\" in the last 72 hours.  BNP: No results for input(s): \"PROBNP\" in the last 72 hours.  UA:  Lab Results   Component Value Date/Time    NITRU POSITIVE 07/26/2025 05:49 AM    COLORU Yellow 07/26/2025 05:49 AM    PHUR 6.5 07/26/2025 05:49 AM    PHUR 6.5 06/22/2017 05:00 PM    WBCUA 239 07/26/2025 05:49 AM    RBCUA 7 07/26/2025 05:49 AM    BACTERIA 4+ 07/26/2025 05:49 AM    CLARITYU CLOUDY 07/26/2025 05:49 AM    SPECGRAV  06/25/2014 09:11 PM      Comment:      ? no color match    LEUKOCYTESUR LARGE 07/26/2025 05:49 AM    UROBILINOGEN 0.2 07/26/2025 05:49 AM    BILIRUBINUR Negative 07/26/2025 05:49 AM    BLOODU SMALL 07/26/2025 05:49 AM    GLUCOSEU Negative 07/26/2025 05:49 AM    KETUA Negative 07/26/2025 05:49 AM     Urine Cultures:   Lab Results   Component Value Date/Time    LABURIN  07/26/2025 05:49 AM     >100,000 CFU/ml  CONTACT PRECAUTIONS INDICATED  Carbapenem antibiotics are the best option for infections caused  by ESBL producing organisms.  Other antibiotic classes are  likely to result in treatment failure, even for organisms  demonstrating in vitro susceptibility.      LABURIN >100,000 CFU/ml 07/26/2025 05:49 AM     Blood Cultures:   Lab Results

## 2025-07-30 NOTE — PROGRESS NOTES
Patient ID: Radha Carcamo  Referring/ Physician: Batool Chandra MD      Summary:   Radha Carcamo is being seen by nephrology for hyponatremia .     Reason for admission: presented with blood in stool. Found to have very large stool burden throughout the colon and rectum.  Wall thickening  of the rectum may relate to stercoral proctitis.    Hyponatremia noted on admission, asymptomatic with Na 121 initially.       Interval Hx:     No new issues.   Taking PO   Still have blood in stool       UO 1875 <> 1500   /69     Negative 315 cc    Na 125 > 123 > 121c > 122 > 126 > 127   Cr < 0.2   BUN 9    URINE   > 521  URINE SODIUM 24 > 23        Assessment/Plan:   - has long standing hyponatremia, SIADH  - 1500 cc fluid restriction   - add protein supplements TID   - daily labs fine       Recurrent Hhyponatremia.  In the past did not respond to tolvaptan as per my partner's note   Sodium 121.  124--129 and 5/28/2025.  130  2014  UA specific gravity 1032  Echo 4/2025 normal EF  Albumin 2.6 in the past normal LFTs     Acute GI bleeding  #Bright red blood per rectum  - Hemoglobin 9.3,   CTA of abdominal pelvic with contrast no CT findings of acute GI hemorrhage.      #Constipation  - CT of abdominal pelvic with contrast, Very large stool burden throughout the colon and rectum may relate to stercoral proctitis.  - Will provide 1 dose of lactulose and continue home regimen     #Sacral wound currently not infected  #Chronic back pain secondary to above  - Will consult wound care and continue home medication for pain     #Paroxysmal atrial fibrillation   #Bilateral lower extremity swelling  -Stable currently will hold Lasix due to hyponatremia.  Will hold Eliquis due to GI bleeding  -2D echo 5/19/2025: Echo with preserved LVEF, disease  - Continue amiodarone and metoprolo              [facility] would like to thank you for the opportunity to serve this patient. Please call with

## 2025-07-30 NOTE — PROGRESS NOTES
Pt alert x2-3 with episodes on forgetfulness but can be easily redirected. Pleasant at this time. Pt cooperative with  with Q2hr turning. Boyfriend at bedside. Pt has no concerns at this time.

## 2025-07-30 NOTE — PROGRESS NOTES
NP informed that Pt is in extreme pain on knees but she has a low BP. Per NP, okay to give PRN morphine with current BP.

## 2025-07-30 NOTE — PROGRESS NOTES
Patient's latest BP is 89/47 (map 60) on left arm high parham's, 99/59 (map72) on R arm Highfowler's; 90/48 (manual). No complaints of dizziness, headache at this time. NP notified. Midodrine 10mg was given as ordered.

## 2025-07-30 NOTE — PROGRESS NOTES
Bath given to pt. Pt had extra large bright red loose watery bowel movement. MD Chandra at bedside.

## 2025-07-30 NOTE — PLAN OF CARE
Problem: Discharge Planning  Goal: Discharge to home or other facility with appropriate resources  7/29/2025 2324 by Donna Ramos RN  Outcome: Progressing  7/29/2025 1428 by Annalise Roe RN  Outcome: Progressing     Problem: Pain  Goal: Verbalizes/displays adequate comfort level or baseline comfort level  7/29/2025 2324 by Donna Ramos RN  Outcome: Progressing  7/29/2025 1428 by Annalise Roe RN  Outcome: Progressing     Problem: Skin/Tissue Integrity  Goal: Skin integrity remains intact  Description: 1.  Monitor for areas of redness and/or skin breakdown  2.  Assess vascular access sites hourly  3.  Every 4-6 hours minimum:  Change oxygen saturation probe site  4.  Every 4-6 hours:  If on nasal continuous positive airway pressure, respiratory therapy assess nares and determine need for appliance change or resting period  7/29/2025 2324 by Donna Ramos RN  Outcome: Progressing  7/29/2025 1428 by Annalise Roe RN  Outcome: Progressing     Problem: Safety - Adult  Goal: Free from fall injury  7/29/2025 2324 by Donna Ramos RN  Outcome: Progressing  7/29/2025 1428 by Annalise Roe RN  Outcome: Progressing     Problem: ABCDS Injury Assessment  Goal: Absence of physical injury  7/29/2025 2324 by Donna Ramos RN  Outcome: Progressing  7/29/2025 1428 by Annalise Roe RN  Outcome: Progressing     Problem: Neurosensory - Adult  Goal: Achieves stable or improved neurological status  7/29/2025 2324 by Donna Ramos RN  Outcome: Progressing  7/29/2025 1428 by Annalise Roe RN  Outcome: Progressing  Goal: Achieves maximal functionality and self care  7/29/2025 2324 by Donna Ramos RN  Outcome: Progressing  7/29/2025 1428 by Annalise Roe RN  Outcome: Progressing     Problem: Respiratory - Adult  Goal: Achieves optimal ventilation and oxygenation  7/29/2025 2324 by Donna Ramos RN  Outcome: Progressing  7/29/2025 1428

## 2025-07-30 NOTE — PLAN OF CARE
Problem: Discharge Planning  Goal: Discharge to home or other facility with appropriate resources  7/30/2025 1011 by Sandy Sheikh RN  Outcome: Progressing  7/29/2025 2324 by Donna Ramos RN  Outcome: Progressing     Problem: Pain  Goal: Verbalizes/displays adequate comfort level or baseline comfort level  7/30/2025 1011 by Sandy Sheikh RN  Outcome: Progressing  7/29/2025 2324 by Donna Ramos RN  Outcome: Progressing     Problem: Skin/Tissue Integrity  Goal: Skin integrity remains intact  Description: 1.  Monitor for areas of redness and/or skin breakdown  2.  Assess vascular access sites hourly  3.  Every 4-6 hours minimum:  Change oxygen saturation probe site  4.  Every 4-6 hours:  If on nasal continuous positive airway pressure, respiratory therapy assess nares and determine need for appliance change or resting period  7/30/2025 1011 by Sandy Sheikh RN  Outcome: Progressing  7/29/2025 2324 by Donna Ramos RN  Outcome: Progressing     Problem: Safety - Adult  Goal: Free from fall injury  7/30/2025 1011 by Sandy Sheikh RN  Outcome: Progressing  7/29/2025 2324 by Donna Rmaos RN  Outcome: Progressing     Problem: ABCDS Injury Assessment  Goal: Absence of physical injury  7/30/2025 1011 by Sandy Sheikh RN  Outcome: Progressing  7/29/2025 2324 by Donna Ramos RN  Outcome: Progressing     Problem: Neurosensory - Adult  Goal: Achieves stable or improved neurological status  7/30/2025 1011 by Sandy Sheikh RN  Outcome: Progressing  7/29/2025 2324 by Donna Ramos RN  Outcome: Progressing  Goal: Achieves maximal functionality and self care  7/30/2025 1011 by Sandy Sheikh RN  Outcome: Progressing  7/29/2025 2324 by Donna Ramos RN  Outcome: Progressing     Problem: Respiratory - Adult  Goal: Achieves optimal ventilation and oxygenation  7/30/2025 1011 by Sandy Sheikh RN  Outcome: Progressing  7/29/2025 2324 by Donna Ramos RN  Outcome: Progressing    Donna Ramos RN  Outcome: Progressing     Problem: Hematologic - Adult  Goal: Maintains hematologic stability  7/30/2025 1011 by Sandy Sheikh RN  Outcome: Progressing  7/29/2025 2324 by Donna Ramos RN  Outcome: Progressing     Problem: Anxiety  Goal: Will report anxiety at manageable levels  Description: INTERVENTIONS:  1. Administer medication as ordered  2. Teach and rehearse alternative coping skills  3. Provide emotional support with 1:1 interaction with staff  7/30/2025 1011 by Sandy Sheikh RN  Outcome: Progressing  7/29/2025 2324 by Donna Ramos RN  Outcome: Progressing     Problem: Confusion  Goal: Confusion, delirium, dementia, or psychosis is improved or at baseline  Description: INTERVENTIONS:  1. Assess for possible contributors to thought disturbance, including medications, impaired vision or hearing, underlying metabolic abnormalities, dehydration, psychiatric diagnoses, and notify attending LIP  2. [place] high risk fall precautions, as indicated  3. Provide frequent short contacts to provide reality reorientation, refocusing and direction  4. Decrease environmental stimuli, including noise as appropriate  5. Monitor and intervene to maintain adequate nutrition, hydration, elimination, sleep and activity  6. If unable to ensure safety without constant attention obtain sitter and review sitter guidelines with assigned personnel  7. Initiate Psychosocial CNS and Spiritual Care consult, as indicated  7/30/2025 1011 by Sandy Sheikh RN  Outcome: Progressing  7/29/2025 2324 by Donna Ramos RN  Outcome: Progressing     Problem: Behavior  Goal: Pt/Family maintain appropriate behavior and adhere to behavioral management agreement, if implemented  Description: INTERVENTIONS:  1. Assess patient/family's coping skills and  non-compliant behavior (including use of illegal substances)  2. Notify security of behavior or suspected illegal substances which indicate the need for

## 2025-07-30 NOTE — PROGRESS NOTES
Gastroenterology Progress Note    Radha Carcamo is a 79 y.o. female patient.  Principal Problem:    Hyponatremia  Active Problems:    Acute GI bleeding  Resolved Problems:    * No resolved hospital problems. *      SUBJECTIVE:  Called by nurse for red blood per rectum. Moderate amount.  Patient says she feels well.  No abdominal pain.  No vomiting.    Current Facility-Administered Medications: meropenem (MERREM) 1,000 mg in sodium chloride 0.9 % 100 mL IVPB (addEASE), 1,000 mg, IntraVENous, Q8H  polyethylene glycol (GoLYTELY) solution 2,000 mL, 2,000 mL, Oral, Once  polyethylene glycol (GLYCOLAX) packet 17 g, 17 g, Oral, BID  tamsulosin (FLOMAX) capsule 0.4 mg, 0.4 mg, Oral, Daily  oxyCODONE-acetaminophen (PERCOCET) 5-325 MG per tablet 1 tablet, 1 tablet, Oral, Q6H PRN  apixaban (ELIQUIS) tablet 5 mg, 5 mg, Oral, BID  gabapentin (NEURONTIN) capsule 100 mg, 100 mg, Oral, TID  [Held by provider] furosemide (LASIX) tablet 20 mg, 20 mg, Oral, Daily  sodium chloride flush 0.9 % injection 5-40 mL, 5-40 mL, IntraVENous, 2 times per day  sodium chloride flush 0.9 % injection 5-40 mL, 5-40 mL, IntraVENous, PRN  0.9 % sodium chloride infusion, , IntraVENous, PRN  potassium chloride (KLOR-CON M) extended release tablet 40 mEq, 40 mEq, Oral, PRN **OR** potassium bicarb-citric acid (EFFER-K) effervescent tablet 40 mEq, 40 mEq, Oral, PRN **OR** potassium chloride 10 mEq/100 mL IVPB (Peripheral Line), 10 mEq, IntraVENous, PRN  magnesium sulfate 2000 mg in 50 mL IVPB premix, 2,000 mg, IntraVENous, PRN  ondansetron (ZOFRAN-ODT) disintegrating tablet 4 mg, 4 mg, Oral, Q8H PRN **OR** ondansetron (ZOFRAN) injection 4 mg, 4 mg, IntraVENous, Q6H PRN  acetaminophen (TYLENOL) tablet 650 mg, 650 mg, Oral, Q6H PRN **OR** acetaminophen (TYLENOL) suppository 650 mg, 650 mg, Rectal, Q6H PRN  melatonin tablet 3 mg, 3 mg, Oral, Nightly  morphine (PF) injection 2 mg, 2 mg, IntraVENous, Q4H PRN  lactulose (CHRONULAC) 10 GM/15ML solution 20 g,

## 2025-07-31 PROBLEM — M46.28 SACRAL OSTEOMYELITIS (HCC): Status: ACTIVE | Noted: 2025-07-31

## 2025-07-31 PROBLEM — N30.00 ACUTE CYSTITIS WITHOUT HEMATURIA: Status: ACTIVE | Noted: 2025-07-31

## 2025-07-31 PROBLEM — K59.00 CONSTIPATION: Status: ACTIVE | Noted: 2025-07-31

## 2025-07-31 PROBLEM — Z16.12 UTI DUE TO EXTENDED-SPECTRUM BETA LACTAMASE (ESBL) PRODUCING ESCHERICHIA COLI: Status: ACTIVE | Noted: 2025-07-31

## 2025-07-31 PROBLEM — K62.5 RECTAL BLEEDING: Status: ACTIVE | Noted: 2025-07-31

## 2025-07-31 PROBLEM — R10.9 ACUTE ABDOMINAL PAIN: Status: ACTIVE | Noted: 2025-07-31

## 2025-07-31 PROBLEM — Z79.01 CHRONIC ANTICOAGULATION: Status: ACTIVE | Noted: 2025-07-31

## 2025-07-31 PROBLEM — D50.9 IRON DEFICIENCY ANEMIA: Status: ACTIVE | Noted: 2025-07-31

## 2025-07-31 PROBLEM — B96.29 UTI DUE TO EXTENDED-SPECTRUM BETA LACTAMASE (ESBL) PRODUCING ESCHERICHIA COLI: Status: ACTIVE | Noted: 2025-07-31

## 2025-07-31 PROBLEM — N39.0 UTI DUE TO EXTENDED-SPECTRUM BETA LACTAMASE (ESBL) PRODUCING ESCHERICHIA COLI: Status: ACTIVE | Noted: 2025-07-31

## 2025-07-31 LAB
ANION GAP SERPL CALCULATED.3IONS-SCNC: 9 MMOL/L (ref 3–16)
BASOPHILS # BLD: 0 K/UL (ref 0–0.2)
BASOPHILS NFR BLD: 0.5 %
BUN SERPL-MCNC: 6 MG/DL (ref 7–20)
CALCIUM SERPL-MCNC: 9.4 MG/DL (ref 8.3–10.6)
CHLORIDE SERPL-SCNC: 96 MMOL/L (ref 99–110)
CO2 SERPL-SCNC: 27 MMOL/L (ref 21–32)
CREAT SERPL-MCNC: <0.2 MG/DL (ref 0.6–1.2)
DEPRECATED RDW RBC AUTO: 16.6 % (ref 12.4–15.4)
EOSINOPHIL # BLD: 0.1 K/UL (ref 0–0.6)
EOSINOPHIL NFR BLD: 2 %
GFR SERPLBLD CREATININE-BSD FMLA CKD-EPI: >90 ML/MIN/{1.73_M2}
GLUCOSE SERPL-MCNC: 101 MG/DL (ref 70–99)
HCT VFR BLD AUTO: 25.1 % (ref 36–48)
HGB BLD-MCNC: 8.2 G/DL (ref 12–16)
LYMPHOCYTES # BLD: 1 K/UL (ref 1–5.1)
LYMPHOCYTES NFR BLD: 18 %
MCH RBC QN AUTO: 30.4 PG (ref 26–34)
MCHC RBC AUTO-ENTMCNC: 32.7 G/DL (ref 31–36)
MCV RBC AUTO: 93.1 FL (ref 80–100)
MONOCYTES # BLD: 0.4 K/UL (ref 0–1.3)
MONOCYTES NFR BLD: 7.4 %
NEUTROPHILS # BLD: 4.2 K/UL (ref 1.7–7.7)
NEUTROPHILS NFR BLD: 72.1 %
PLATELET # BLD AUTO: 461 K/UL (ref 135–450)
PMV BLD AUTO: 7 FL (ref 5–10.5)
POTASSIUM SERPL-SCNC: 3.6 MMOL/L (ref 3.5–5.1)
RBC # BLD AUTO: 2.69 M/UL (ref 4–5.2)
SODIUM SERPL-SCNC: 132 MMOL/L (ref 136–145)
WBC # BLD AUTO: 5.8 K/UL (ref 4–11)

## 2025-07-31 PROCEDURE — 85025 COMPLETE CBC W/AUTO DIFF WBC: CPT

## 2025-07-31 PROCEDURE — 6370000000 HC RX 637 (ALT 250 FOR IP): Performed by: INTERNAL MEDICINE

## 2025-07-31 PROCEDURE — 80048 BASIC METABOLIC PNL TOTAL CA: CPT

## 2025-07-31 PROCEDURE — 36415 COLL VENOUS BLD VENIPUNCTURE: CPT

## 2025-07-31 PROCEDURE — 99223 1ST HOSP IP/OBS HIGH 75: CPT | Performed by: INTERNAL MEDICINE

## 2025-07-31 PROCEDURE — 2580000003 HC RX 258: Performed by: INTERNAL MEDICINE

## 2025-07-31 PROCEDURE — 2500000003 HC RX 250 WO HCPCS: Performed by: INTERNAL MEDICINE

## 2025-07-31 PROCEDURE — 6360000002 HC RX W HCPCS: Performed by: INTERNAL MEDICINE

## 2025-07-31 PROCEDURE — 1200000000 HC SEMI PRIVATE

## 2025-07-31 PROCEDURE — G0545 PR INHERENT VISIT TO INPT: HCPCS | Performed by: INTERNAL MEDICINE

## 2025-07-31 PROCEDURE — 94760 N-INVAS EAR/PLS OXIMETRY 1: CPT

## 2025-07-31 RX ADMIN — ONDANSETRON 4 MG: 2 INJECTION, SOLUTION INTRAMUSCULAR; INTRAVENOUS at 19:37

## 2025-07-31 RX ADMIN — SODIUM CHLORIDE, PRESERVATIVE FREE 10 ML: 5 INJECTION INTRAVENOUS at 21:20

## 2025-07-31 RX ADMIN — MEROPENEM 1000 MG: 1 INJECTION INTRAVENOUS at 14:18

## 2025-07-31 RX ADMIN — HYDROCORTISONE ACETATE 25 MG: 25 SUPPOSITORY RECTAL at 01:13

## 2025-07-31 RX ADMIN — GABAPENTIN 100 MG: 100 CAPSULE ORAL at 14:16

## 2025-07-31 RX ADMIN — GABAPENTIN 100 MG: 100 CAPSULE ORAL at 21:17

## 2025-07-31 RX ADMIN — APIXABAN 5 MG: 5 TABLET, FILM COATED ORAL at 09:31

## 2025-07-31 RX ADMIN — TAMSULOSIN HYDROCHLORIDE 0.4 MG: 0.4 CAPSULE ORAL at 09:31

## 2025-07-31 RX ADMIN — MORPHINE SULFATE 2 MG: 2 INJECTION, SOLUTION INTRAMUSCULAR; INTRAVENOUS at 00:40

## 2025-07-31 RX ADMIN — HYDROCORTISONE ACETATE 25 MG: 25 SUPPOSITORY RECTAL at 09:30

## 2025-07-31 RX ADMIN — MORPHINE SULFATE 2 MG: 2 INJECTION, SOLUTION INTRAMUSCULAR; INTRAVENOUS at 10:15

## 2025-07-31 RX ADMIN — APIXABAN 5 MG: 5 TABLET, FILM COATED ORAL at 21:17

## 2025-07-31 RX ADMIN — OXYCODONE AND ACETAMINOPHEN 1 TABLET: 5; 325 TABLET ORAL at 21:17

## 2025-07-31 RX ADMIN — SODIUM CHLORIDE, PRESERVATIVE FREE 10 ML: 5 INJECTION INTRAVENOUS at 09:31

## 2025-07-31 RX ADMIN — Medication 3 MG: at 21:20

## 2025-07-31 RX ADMIN — MORPHINE SULFATE 2 MG: 2 INJECTION, SOLUTION INTRAMUSCULAR; INTRAVENOUS at 19:37

## 2025-07-31 RX ADMIN — MORPHINE SULFATE 2 MG: 2 INJECTION, SOLUTION INTRAMUSCULAR; INTRAVENOUS at 14:16

## 2025-07-31 RX ADMIN — AMIODARONE HYDROCHLORIDE 200 MG: 200 TABLET ORAL at 09:31

## 2025-07-31 RX ADMIN — POLYETHYLENE GLYCOL 3350 17 G: 17 POWDER, FOR SOLUTION ORAL at 09:30

## 2025-07-31 RX ADMIN — GABAPENTIN 100 MG: 100 CAPSULE ORAL at 09:31

## 2025-07-31 RX ADMIN — MEROPENEM 1000 MG: 1 INJECTION INTRAVENOUS at 06:48

## 2025-07-31 ASSESSMENT — PAIN DESCRIPTION - DESCRIPTORS
DESCRIPTORS: BURNING
DESCRIPTORS: ACHING;BURNING
DESCRIPTORS: ACHING
DESCRIPTORS: ACHING

## 2025-07-31 ASSESSMENT — PAIN SCALES - GENERAL
PAINLEVEL_OUTOF10: 7
PAINLEVEL_OUTOF10: 9
PAINLEVEL_OUTOF10: 1
PAINLEVEL_OUTOF10: 2
PAINLEVEL_OUTOF10: 0
PAINLEVEL_OUTOF10: 10
PAINLEVEL_OUTOF10: 10

## 2025-07-31 ASSESSMENT — PAIN - FUNCTIONAL ASSESSMENT
PAIN_FUNCTIONAL_ASSESSMENT: ACTIVITIES ARE NOT PREVENTED

## 2025-07-31 ASSESSMENT — PAIN DESCRIPTION - LOCATION
LOCATION: KNEE
LOCATION: SACRUM

## 2025-07-31 ASSESSMENT — PAIN DESCRIPTION - ORIENTATION
ORIENTATION: MID;POSTERIOR
ORIENTATION: RIGHT;LEFT

## 2025-07-31 NOTE — PLAN OF CARE
Problem: Discharge Planning  Goal: Discharge to home or other facility with appropriate resources  Outcome: Progressing  Flowsheets (Taken 7/30/2025 1200 by Sandy Sheikh, RN)  Discharge to home or other facility with appropriate resources:   Identify barriers to discharge with patient and caregiver   Arrange for needed discharge resources and transportation as appropriate   Identify discharge learning needs (meds, wound care, etc)     Problem: Pain  Goal: Verbalizes/displays adequate comfort level or baseline comfort level  Outcome: Progressing     Problem: Skin/Tissue Integrity  Goal: Skin integrity remains intact  Description: 1.  Monitor for areas of redness and/or skin breakdown  2.  Assess vascular access sites hourly  3.  Every 4-6 hours minimum:  Change oxygen saturation probe site  4.  Every 4-6 hours:  If on nasal continuous positive airway pressure, respiratory therapy assess nares and determine need for appliance change or resting period  Outcome: Progressing  Flowsheets (Taken 7/30/2025 1200 by Sandy Sheikh, RN)  Skin Integrity Remains Intact:   Monitor for areas of redness and/or skin breakdown   Assess vascular access sites hourly   Every 4-6 hours minimum:  Change oxygen saturation probe site     Problem: Safety - Adult  Goal: Free from fall injury  Outcome: Progressing     Problem: ABCDS Injury Assessment  Goal: Absence of physical injury  Outcome: Progressing     Problem: Neurosensory - Adult  Goal: Achieves stable or improved neurological status  Outcome: Progressing  Flowsheets (Taken 7/30/2025 1200 by Sandy Sheikh, RN)  Achieves stable or improved neurological status:   Assess for and report changes in neurological status   Initiate measures to prevent increased intracranial pressure   Maintain blood pressure and fluid volume within ordered parameters to optimize cerebral perfusion and minimize risk of hemorrhage  Goal: Achieves maximal functionality and self care  Outcome:  1.  Monitor for areas of redness and/or skin breakdown  2.  Assess vascular access sites hourly  3.  Every 4-6 hours minimum:  Change oxygen saturation probe site  4.  Every 4-6 hours:  If on nasal continuous positive airway pressure, respiratory therapy assess nares and determine need for appliance change or resting period  Outcome: Progressing  Flowsheets (Taken 7/30/2025 1200 by Sandy Sheikh RN)  Skin Integrity Remains Intact:   Monitor for areas of redness and/or skin breakdown   Assess vascular access sites hourly   Every 4-6 hours minimum:  Change oxygen saturation probe site  Goal: Incisions, wounds, or drain sites healing without S/S of infection  Outcome: Progressing  Flowsheets (Taken 7/30/2025 1200 by Sandy Sheikh RN)  Incisions, Wounds, or Drain Sites Healing Without Sign and Symptoms of Infection: ADMISSION and DAILY: Assess and document risk factors for pressure ulcer development  Goal: Oral mucous membranes remain intact  Outcome: Progressing  Flowsheets (Taken 7/30/2025 1200 by Sandy Sheikh RN)  Oral Mucous Membranes Remain Intact:   Implement preventative oral hygiene regimen   Assess oral mucosa and hygiene practices   Implement oral medicated treatments as ordered     Problem: Musculoskeletal - Adult  Goal: Return mobility to safest level of function  Outcome: Progressing  Flowsheets (Taken 7/30/2025 1200 by Sandy Sheikh RN)  Return Mobility to Safest Level of Function:   Assess patient stability and activity tolerance for standing, transferring and ambulating with or without assistive devices   Assist with transfers and ambulation using safe patient handling equipment as needed  Goal: Maintain proper alignment of affected body part  Outcome: Progressing  Flowsheets (Taken 7/30/2025 1200 by Sandy Sheikh RN)  Maintain proper alignment of affected body part:   Support and protect limb and body alignment per provider's orders   Instruct and reinforce with patient and family use of

## 2025-07-31 NOTE — PROGRESS NOTES
V2.0    [facility] [place]      Name:  Radha Carcamo /Age/Sex: 1945  (79 y.o. female)   MRN & CSN:  [id number] & [id number] Encounter Date/Time: 2025 5:28 PM EDT   Location:  [id number] PCP: Madai Morocho APRN - NP     Attending:Batool Chandra MD       Hospital Day: 7    Assessment and Recommendations   Radha Carcamo is a 79 y.o. female who presents with Hyponatremia      Plan:       Hyponatremia  Hypochloremia  - Na 121 on admit  - nephrology consulted  - started on IVF NS at 100/hr-->dced  - monitor Na  - on ureNa  - received tolvaptan  - ureNa held   - Na 126-->129-->132     Acute GI bleed  Hematochezia  Constipation  Poss stercoral ulcer/colitis  Iron def anemia  - hb dropped from 9.3-->8.9-->8.2-->8.0-->7.2-->8.3  - monitor H&H  - GI consulted  - held eliquis-->resume per GI since   - started on miralax  - GI ordered golytely, s/p colonoscopy   - cont IV iron  - cont abx     UTI  - UA reviewed  - await urine cx with ESBL e coli and klebsiella  - switch rocephin to merrem #4  - consulted ID     Sacral wound  - wound care consulted     Review and resume home meds            Diet ADULT DIET; Regular; 1500 ml  ADULT ORAL NUTRITION SUPPLEMENT; Breakfast, Lunch, Dinner; Standard High Calorie/High Protein Oral Supplement   DVT Prophylaxis [] Lovenox, []  Heparin, [] SCDs, [] Ambulation,  [x] Eliquis, [] Xarelto  [] Coumadin   Code Status Full Code             Personally reviewed Lab Studies and Imaging       Subjective:     Ree was dced yesterday, having good UOP    Review of Systems:      Pertinent positives and negatives discussed in HPI    Objective:     Intake/Output Summary (Last 24 hours) at 2025 1728  Last data filed at 2025 1525  Gross per 24 hour   Intake 975.73 ml   Output --   Net 975.73 ml      Vitals:   Vitals:    25 1045 25 1132 25 1446 25 1540   BP:  132/77  135/75   Pulse:  78  85   Resp: 16 16 16 17   Temp:  98.8 °F  Negative sonographic [place]'s sign.  Common bile duct is mildly dilated, measuring 13 mm in diameter. RIGHT KIDNEY: The right kidney is grossly unremarkable without evidence of hydronephrosis. PANCREAS:  Nonvisualized. OTHER: No evidence of right upper quadrant ascites.     Mild intra/extrahepatic biliary dilatation.     Colonoscopy  Result Date: 7/27/2025  No dictation     CTA ABDOMEN PELVIS W CONTRAST  Result Date: 7/25/2025  EXAMINATION: CTA OF THE ABDOMEN AND PELVIS WITH CONTRAST 7/25/2025 3:51 pm: TECHNIQUE: CTA of the abdomen and pelvis was performed with the administration of intravenous contrast. Multiplanar reformatted images are provided for review. MIP images are provided for review. Automated exposure control, iterative reconstruction, and/or weight based adjustment of the mA/kV was utilized to reduce the radiation dose to as low as reasonably achievable. COMPARISON: CT 05/19/2025. HISTORY: ORDERING SYSTEM PROVIDED HISTORY: Abdominal pain rectal bleeding TECHNOLOGIST PROVIDED HISTORY: Reason for exam:->Abdominal pain rectal bleeding Additional Contrast?->1 Reason for Exam: Abdominal pain rectal bleeding Additional signs and symptoms: wound on backside, FINDINGS: CTA ABDOMEN: Lower chest: No acute findings. Abdominal aorta/Branches: No aneurysm, dissection, or occlusion of the abdominal aorta or its proximal major branches. Organs: No focal lesions in the liver, spleen, pancreas, or adrenal glands. No hydronephrosis or nephrolithiasis.  Focal hypoenhancement of the peripheral right lower kidney (images 62 through 66 of series 4). Gallbladder not visualized.  There is dilation of the intrahepatic and extrahepatic biliary ducts with common bile duct measuring 1.3 cm in diameter this is similar to comparison CT 05/19/2025. GI/Bowel: No evidence of bowel obstruction.  Very large stool burden in the colon and rectum.  No findings of active contrast extravasation into the bowel lumen.  There is fecalization of

## 2025-07-31 NOTE — PROGRESS NOTES
Patient ID: Radha Carcamo  Referring/ Physician: Batool Chandra MD      Summary:   Radha Carcamo is being seen by nephrology for hyponatremia .     Reason for admission: presented with blood in stool. Found to have very large stool burden throughout the colon and rectum.  Wall thickening  of the rectum may relate to stercoral proctitis.    Hyponatremia noted on admission, asymptomatic with Na 121 initially.       Interval Hx:     No new issues.   No complaints.   Tolerating PO     /77  On room air    cc      Na 125 > 123 > 121c > 122 > 126 > 127 > 132  K 3.6   BUN 6      URINE   > 521  URINE SODIUM 24 > 23        Assessment/Plan:   - has long standing hyponatremia, SIADH  - 1500 cc fluid restriction   - continue protein supplements TID   - daily labs fine , last Na 132 which is steadily improving at an appropriate rate of rise.       Recurrent Hhyponatremia.  In the past did not respond to tolvaptan as per my partner's note   Sodium 121.  124--129 and 5/28/2025.  130  2014  UA specific gravity 1032  Echo 4/2025 normal EF  Albumin 2.6 in the past normal LFTs     Acute GI bleeding  #Bright red blood per rectum  - Hemoglobin 9.3,   CTA of abdominal pelvic with contrast no CT findings of acute GI hemorrhage.      #Constipation  - CT of abdominal pelvic with contrast, Very large stool burden throughout the colon and rectum may relate to stercoral proctitis.  - Will provide 1 dose of lactulose and continue home regimen     #Sacral wound currently not infected  #Chronic back pain secondary to above  - Will consult wound care and continue home medication for pain     #Paroxysmal atrial fibrillation   #Bilateral lower extremity swelling  -Stable currently will hold Lasix due to hyponatremia.  Will hold Eliquis due to GI bleeding  -2D echo 5/19/2025: Echo with preserved LVEF, disease  - Continue amiodarone and metoprolo              [facility] would like to thank  crackles   Cardiovascular: Ausculation shows RRR and  no edema   Abdomen: abdomen is soft  Neuro:  Follows commands      Data:   CBC:   Recent Labs     07/29/25  0525 07/30/25  0519 07/30/25  1300 07/31/25  0545   WBC 5.8 5.2  --  5.8   HGB 8.2* 7.2* 8.3* 8.2*   HCT 23.8* 21.4* 23.8* 25.1*    401  --  461*     BMP:    Recent Labs     07/29/25  0525 07/29/25  1703 07/29/25  2358 07/30/25  0519 07/31/25  0545   *  125*   < > 126* 127* 132*   K 3.0*  --   --   --  3.6   CL 88*  --   --   --  96*   CO2 26  --   --   --  27   BUN 9  --   --   --  6*   CREATININE <0.2*  --   --   --  <0.2*   GLUCOSE 114*  --   --   --  101*   MG 1.66*  --   --   --   --     < > = values in this interval not displayed.     Lab Results   Component Value Date/Time    COLORU Yellow 07/26/2025 05:49 AM    NITRU POSITIVE 07/26/2025 05:49 AM    GLUCOSEU Negative 07/26/2025 05:49 AM    KETUA Negative 07/26/2025 05:49 AM    UROBILINOGEN 0.2 07/26/2025 05:49 AM    BILIRUBINUR Negative 07/26/2025 05:49 AM

## 2025-07-31 NOTE — PLAN OF CARE
Problem: Discharge Planning  Goal: Discharge to home or other facility with appropriate resources  7/31/2025 0953 by Sandy Sheikh RN  Outcome: Progressing  7/31/2025 0124 by Donna Ramos RN  Outcome: Progressing  Flowsheets (Taken 7/30/2025 1200 by Sandy Sheikh, RN)  Discharge to home or other facility with appropriate resources:   Identify barriers to discharge with patient and caregiver   Arrange for needed discharge resources and transportation as appropriate   Identify discharge learning needs (meds, wound care, etc)     Problem: Pain  Goal: Verbalizes/displays adequate comfort level or baseline comfort level  7/31/2025 0953 by Sandy Sheikh RN  Outcome: Progressing  7/31/2025 0124 by Donna Ramos RN  Outcome: Progressing     Problem: Skin/Tissue Integrity  Goal: Skin integrity remains intact  Description: 1.  Monitor for areas of redness and/or skin breakdown  2.  Assess vascular access sites hourly  3.  Every 4-6 hours minimum:  Change oxygen saturation probe site  4.  Every 4-6 hours:  If on nasal continuous positive airway pressure, respiratory therapy assess nares and determine need for appliance change or resting period  7/31/2025 0953 by Sandy Sheikh RN  Outcome: Progressing  7/31/2025 0124 by Donna Ramos RN  Outcome: Progressing  Flowsheets (Taken 7/30/2025 1200 by Sandy Sheikh, RN)  Skin Integrity Remains Intact:   Monitor for areas of redness and/or skin breakdown   Assess vascular access sites hourly   Every 4-6 hours minimum:  Change oxygen saturation probe site     Problem: Safety - Adult  Goal: Free from fall injury  7/31/2025 0953 by Sandy Sheikh RN  Outcome: Progressing  7/31/2025 0124 by Donna Ramos RN  Outcome: Progressing     Problem: ABCDS Injury Assessment  Goal: Absence of physical injury  7/31/2025 0953 by Sandy Sheikh RN  Outcome: Progressing  7/31/2025 0124 by Donna Ramos RN  Outcome: Progressing     Problem: Neurosensory -  ZAYRA Delgado)  Incisions, Wounds, or Drain Sites Healing Without Sign and Symptoms of Infection: ADMISSION and DAILY: Assess and document risk factors for pressure ulcer development  Goal: Oral mucous membranes remain intact  7/31/2025 0953 by Sandy Sheikh RN  Outcome: Progressing  7/31/2025 0124 by Donna Ramos RN  Outcome: Progressing  Flowsheets (Taken 7/30/2025 1200 by Sandy Sheikh RN)  Oral Mucous Membranes Remain Intact:   Implement preventative oral hygiene regimen   Assess oral mucosa and hygiene practices   Implement oral medicated treatments as ordered     Problem: Musculoskeletal - Adult  Goal: Return mobility to safest level of function  7/31/2025 0953 by Sandy Sheikh RN  Outcome: Progressing  7/31/2025 0124 by Donna Ramos RN  Outcome: Progressing  Flowsheets (Taken 7/30/2025 1200 by Sandy Sheikh RN)  Return Mobility to Safest Level of Function:   Assess patient stability and activity tolerance for standing, transferring and ambulating with or without assistive devices   Assist with transfers and ambulation using safe patient handling equipment as needed  Goal: Maintain proper alignment of affected body part  7/31/2025 0953 by Sandy Sheikh RN  Outcome: Progressing  7/31/2025 0124 by Donna Ramos RN  Outcome: Progressing  Flowsheets (Taken 7/30/2025 1200 by Sandy Sheikh RN)  Maintain proper alignment of affected body part:   Support and protect limb and body alignment per provider's orders   Instruct and reinforce with patient and family use of appropriate assistive device and precautions (e.g. spinal or hip dislocation precautions)  Goal: Return ADL status to a safe level of function  7/31/2025 0953 by Sandy Sheikh RN  Outcome: Progressing  7/31/2025 0124 by Donna Ramos RN  Outcome: Progressing  Flowsheets (Taken 7/30/2025 1200 by Sandy Sheikh RN)  Return ADL Status to a Safe Level of Function:   Administer medication as ordered   Assess activities of daily  living deficits and provide assistive devices as needed   Obtain physical therapy/occupational therapy consults as needed     Problem: Gastrointestinal - Adult  Goal: Minimal or absence of nausea and vomiting  7/31/2025 0953 by Sandy Sheikh RN  Outcome: Progressing  7/31/2025 0124 by Donna Ramos RN  Outcome: Progressing  Flowsheets (Taken 7/30/2025 1200 by Sandy Sheikh RN)  Minimal or absence of nausea and vomiting: Administer IV fluids as ordered to ensure adequate hydration  Goal: Maintains or returns to baseline bowel function  7/31/2025 0953 by Sandy Sheikh RN  Outcome: Progressing  7/31/2025 0124 by Donna Ramos RN  Outcome: Progressing  Flowsheets (Taken 7/30/2025 1200 by Sandy Sheikh RN)  Maintains or returns to baseline bowel function:   Assess bowel function   Encourage oral fluids to ensure adequate hydration   Administer IV fluids as ordered to ensure adequate hydration  Goal: Maintains adequate nutritional intake  7/31/2025 0953 by Sandy Sheikh RN  Outcome: Progressing  7/31/2025 0124 by Donna Ramos RN  Outcome: Progressing  Flowsheets (Taken 7/30/2025 1200 by Sandy Sheikh RN)  Maintains adequate nutritional intake:   Monitor percentage of each meal consumed   Identify factors contributing to decreased intake, treat as appropriate   Assist with meals as needed     Problem: Genitourinary - Adult  Goal: Absence of urinary retention  7/31/2025 0953 by Sandy Sheikh RN  Outcome: Progressing  7/31/2025 0124 by Donna aRmos RN  Outcome: Progressing  Flowsheets (Taken 7/30/2025 1200 by Sandy Sheikh RN)  Absence of urinary retention:   Assess patient’s ability to void and empty bladder   Monitor intake/output and perform bladder scan as needed   Place urinary catheter per Licensed Independent Practitioner order if needed   Discuss with Licensed Independent Practitioner  medications to alleviate retention as needed  Goal: Urinary catheter remains patent  7/31/2025

## 2025-07-31 NOTE — CONSULTS
Infectious Diseases Inpatient Consult Note      Reason for Consult: Sacral decubitus ulcer with MDRO infection and ESBL E. coli UTI    Requesting Physician:  Dr. Chandra     Primary Care Physician:  Madai Morocho APRN - NP    History Obtained From:  Epic     CHIEF COMPLAINT:     Chief Complaint   Patient presents with    Rectal Bleeding     Pt came from a nursing facility who reported pt has bright red liquid stool that started two days ago. Pt reports 10/10 bilateral knee pain.Notable bilateral lower extremity edema.          HISTORY OF PRESENT ILLNESS:  79 y.o. female significant history for atrial fibrillation on Eliquis, chronic sacral decubitus ulcer history of MRSA infection, lumbar spinal stenosis hyponatremia admitted to hospital secondary to rectal bleeding.,  Labs on admission Sodium 121 creatinine 0.3 hemoglobin 8.7 WBC 6.1 urinalysis abnormal with 4+ bacteria urine culture positive for ESBL E. coli and Klebsiella.  Patient has a history of sacral decubitus ulcer wound culture positive for E. coli and Bacteroides from before also history of MRSA infection in the past.,  Tmax 99.5 CT abdomen pelvis with very large stool burden colonic diverticulosis noted with bladder wall thickening concern for cystitis chest x-ray negative we are consulted for recommendations        Past Medical History:    Past Medical History:   Diagnosis Date    Spinal stenosis        Past Surgical History:    Past Surgical History:   Procedure Laterality Date    COLONOSCOPY N/A 7/29/2025    COLONOSCOPY performed by Kulwinder Agudelo MD at [facility] ENDOSCOPY    EYE SURGERY      cataract ou    RECTAL SURGERY N/A 5/20/2025    EXCISIONAL DEBRIDEMENT SACRAL ULCER performed by Milo Champion MD at [facility] OR    UPPER GASTROINTESTINAL ENDOSCOPY N/A 7/29/2025    ESOPHAGOGASTRODUODENOSCOPY BIOPSY performed by Kulwinder Agudelo MD at [facility] ENDOSCOPY       Current Medications:    Outpatient Medications Marked as Taking for the  Specimen: Urine, clean catch Updated: 07/29/25 0525    Organism Escherichia coli ESBL Abnormal     Urine Culture, Routine -- Abnormal     >100,000 CFU/ml  CONTACT PRECAUTIONS INDICATED  Carbapenem antibiotics are the best option for infections caused  by ESBL producing organisms.  Other antibiotic classes are  likely to result in treatment failure, even for organisms  demonstrating in vitro susceptibility.   Abnormal     Organism Klebsiella pneumoniae Abnormal     Urine Culture, Routine >100,000 CFU/ml   Narrative:     ORDER#: [id number]                          ORDERED BY: MAREK ARREGUIN  SOURCE: Urine Clean Catch                  COLLECTED:  07/26/25 05:49  ANTIBIOTICS AT ANDR[facility]S.:                      RECEIVED :  07/26/25 06:28  [facility]  [phone]  Microbiology results called to and read back by  mylene gunter rn,  07/28/2025 06:29, [facility]   Susceptibility     Klebsiella pneumoniae Escherichia coli ESBL     BACTERIAL SUSCEPTIBILITY PANEL BY TEJINDER BACTERIAL SUSCEPTIBILITY PANEL BY TEJINDER     ampicillin  Resistant >=32 mcg/mL Resistant     ampicillin-sulbactam <=2 mcg/mL Sensitive 16 mcg/mL Intermediate     ceFAZolin   >=32 mcg/mL Resistant     cefepime <=0.12 mcg/mL Sensitive  Resistant     cefTRIAXone <=0.25 mcg/mL Sensitive >=64 mcg/mL Resistant     ciprofloxacin <=0.06 mcg/mL Sensitive <=0.06 mcg/mL Sensitive     ertapenem <=0.12 mcg/mL Sensitive <=0.12 mcg/mL Sensitive     gentamicin <=1 mcg/mL Sensitive <=1 mcg/mL Sensitive     levofloxacin <=0.12 mcg/mL Sensitive <=0.12 mcg/mL Sensitive     meropenem <=0.25 mcg/mL Sensitive <=0.25 mcg/mL Sensitive     nitrofurantoin 32 mcg/mL Sensitive <=16 mcg/mL Sensitive     piperacillin-tazobactam <=4 mcg/mL Sensitive       trimethoprim-sulfamethoxazole <=20 mcg/mL Sensitive >=320 mcg/mL Resistant                 Linear View         Culture, Surgical [[id number]] (Abnormal)  Collected: 05/20/25 1631   Order Status: Completed Specimen: Specimen from Sacrum

## 2025-08-01 VITALS
OXYGEN SATURATION: 94 % | HEART RATE: 81 BPM | RESPIRATION RATE: 16 BRPM | TEMPERATURE: 98.4 F | HEIGHT: 64 IN | BODY MASS INDEX: 24.84 KG/M2 | DIASTOLIC BLOOD PRESSURE: 91 MMHG | WEIGHT: 145.5 LBS | SYSTOLIC BLOOD PRESSURE: 139 MMHG

## 2025-08-01 LAB
ANION GAP SERPL CALCULATED.3IONS-SCNC: 9 MMOL/L (ref 3–16)
BASOPHILS # BLD: 0.1 K/UL (ref 0–0.2)
BASOPHILS NFR BLD: 1 %
BUN SERPL-MCNC: 9 MG/DL (ref 7–20)
CALCIUM SERPL-MCNC: 9.5 MG/DL (ref 8.3–10.6)
CHLORIDE SERPL-SCNC: 96 MMOL/L (ref 99–110)
CO2 SERPL-SCNC: 27 MMOL/L (ref 21–32)
CREAT SERPL-MCNC: 0.3 MG/DL (ref 0.6–1.2)
DEPRECATED RDW RBC AUTO: 16.9 % (ref 12.4–15.4)
EOSINOPHIL # BLD: 0.2 K/UL (ref 0–0.6)
EOSINOPHIL NFR BLD: 3 %
GFR SERPLBLD CREATININE-BSD FMLA CKD-EPI: >90 ML/MIN/{1.73_M2}
GLUCOSE BLD-MCNC: 106 MG/DL (ref 70–99)
GLUCOSE SERPL-MCNC: 91 MG/DL (ref 70–99)
HCT VFR BLD AUTO: 24.7 % (ref 36–48)
HGB BLD-MCNC: 8.3 G/DL (ref 12–16)
LYMPHOCYTES # BLD: 1.1 K/UL (ref 1–5.1)
LYMPHOCYTES NFR BLD: 20 %
MCH RBC QN AUTO: 31.4 PG (ref 26–34)
MCHC RBC AUTO-ENTMCNC: 33.4 G/DL (ref 31–36)
MCV RBC AUTO: 93.7 FL (ref 80–100)
MONOCYTES # BLD: 0.4 K/UL (ref 0–1.3)
MONOCYTES NFR BLD: 7.7 %
NEUTROPHILS # BLD: 3.7 K/UL (ref 1.7–7.7)
NEUTROPHILS NFR BLD: 68.3 %
PERFORMED ON: ABNORMAL
PLATELET # BLD AUTO: 471 K/UL (ref 135–450)
PMV BLD AUTO: 6.9 FL (ref 5–10.5)
POTASSIUM SERPL-SCNC: 3.7 MMOL/L (ref 3.5–5.1)
RBC # BLD AUTO: 2.63 M/UL (ref 4–5.2)
SODIUM SERPL-SCNC: 132 MMOL/L (ref 136–145)
WBC # BLD AUTO: 5.5 K/UL (ref 4–11)

## 2025-08-01 PROCEDURE — 36415 COLL VENOUS BLD VENIPUNCTURE: CPT

## 2025-08-01 PROCEDURE — 6360000002 HC RX W HCPCS: Performed by: INTERNAL MEDICINE

## 2025-08-01 PROCEDURE — 76937 US GUIDE VASCULAR ACCESS: CPT

## 2025-08-01 PROCEDURE — 80048 BASIC METABOLIC PNL TOTAL CA: CPT

## 2025-08-01 PROCEDURE — 51798 US URINE CAPACITY MEASURE: CPT

## 2025-08-01 PROCEDURE — 94760 N-INVAS EAR/PLS OXIMETRY 1: CPT

## 2025-08-01 PROCEDURE — 51701 INSERT BLADDER CATHETER: CPT

## 2025-08-01 PROCEDURE — 02HV33Z INSERTION OF INFUSION DEVICE INTO SUPERIOR VENA CAVA, PERCUTANEOUS APPROACH: ICD-10-PCS | Performed by: INTERNAL MEDICINE

## 2025-08-01 PROCEDURE — 6370000000 HC RX 637 (ALT 250 FOR IP): Performed by: INTERNAL MEDICINE

## 2025-08-01 PROCEDURE — 99233 SBSQ HOSP IP/OBS HIGH 50: CPT | Performed by: INTERNAL MEDICINE

## 2025-08-01 PROCEDURE — 2580000003 HC RX 258: Performed by: INTERNAL MEDICINE

## 2025-08-01 PROCEDURE — C1751 CATH, INF, PER/CENT/MIDLINE: HCPCS

## 2025-08-01 PROCEDURE — 36569 INSJ PICC 5 YR+ W/O IMAGING: CPT

## 2025-08-01 PROCEDURE — 85025 COMPLETE CBC W/AUTO DIFF WBC: CPT

## 2025-08-01 PROCEDURE — 51702 INSERT TEMP BLADDER CATH: CPT

## 2025-08-01 PROCEDURE — 2500000003 HC RX 250 WO HCPCS: Performed by: INTERNAL MEDICINE

## 2025-08-01 RX ORDER — OXYCODONE AND ACETAMINOPHEN 5; 325 MG/1; MG/1
1 TABLET ORAL EVERY 6 HOURS PRN
Qty: 12 TABLET | Refills: 0 | Status: SHIPPED | OUTPATIENT
Start: 2025-08-01 | End: 2025-08-04

## 2025-08-01 RX ORDER — METOPROLOL SUCCINATE 25 MG/1
25 TABLET, EXTENDED RELEASE ORAL DAILY
Qty: 30 TABLET | Refills: 1 | Status: SHIPPED | OUTPATIENT
Start: 2025-08-01

## 2025-08-01 RX ORDER — HYDROCORTISONE ACETATE 25 MG/1
25 SUPPOSITORY RECTAL 2 TIMES DAILY
Qty: 30 SUPPOSITORY | Refills: 0 | Status: SHIPPED | OUTPATIENT
Start: 2025-08-01

## 2025-08-01 RX ORDER — SODIUM CHLORIDE 0.9 % (FLUSH) 0.9 %
5-40 SYRINGE (ML) INJECTION EVERY 12 HOURS SCHEDULED
Status: DISCONTINUED | OUTPATIENT
Start: 2025-08-01 | End: 2025-08-01 | Stop reason: HOSPADM

## 2025-08-01 RX ORDER — LIDOCAINE HYDROCHLORIDE 20 MG/ML
100 INJECTION, SOLUTION INFILTRATION; PERINEURAL ONCE
Status: DISCONTINUED | OUTPATIENT
Start: 2025-08-01 | End: 2025-08-01 | Stop reason: HOSPADM

## 2025-08-01 RX ORDER — SODIUM CHLORIDE 0.9 % (FLUSH) 0.9 %
5-40 SYRINGE (ML) INJECTION PRN
Status: DISCONTINUED | OUTPATIENT
Start: 2025-08-01 | End: 2025-08-01 | Stop reason: HOSPADM

## 2025-08-01 RX ORDER — SODIUM CHLORIDE 9 MG/ML
INJECTION, SOLUTION INTRAVENOUS PRN
Status: DISCONTINUED | OUTPATIENT
Start: 2025-08-01 | End: 2025-08-01 | Stop reason: HOSPADM

## 2025-08-01 RX ORDER — POLYETHYLENE GLYCOL 3350 17 G/17G
17 POWDER, FOR SOLUTION ORAL 2 TIMES DAILY
Qty: 527 G | Refills: 1 | Status: SHIPPED | OUTPATIENT
Start: 2025-08-01 | End: 2025-08-31

## 2025-08-01 RX ORDER — TAMSULOSIN HYDROCHLORIDE 0.4 MG/1
0.4 CAPSULE ORAL DAILY
Qty: 30 CAPSULE | Refills: 3 | Status: SHIPPED | OUTPATIENT
Start: 2025-08-02

## 2025-08-01 RX ADMIN — AMIODARONE HYDROCHLORIDE 200 MG: 200 TABLET ORAL at 08:36

## 2025-08-01 RX ADMIN — APIXABAN 5 MG: 5 TABLET, FILM COATED ORAL at 08:36

## 2025-08-01 RX ADMIN — MEROPENEM 1000 MG: 1 INJECTION INTRAVENOUS at 05:47

## 2025-08-01 RX ADMIN — MORPHINE SULFATE 2 MG: 2 INJECTION, SOLUTION INTRAMUSCULAR; INTRAVENOUS at 08:36

## 2025-08-01 RX ADMIN — HYDROCORTISONE ACETATE 25 MG: 25 SUPPOSITORY RECTAL at 08:36

## 2025-08-01 RX ADMIN — ONDANSETRON 4 MG: 2 INJECTION, SOLUTION INTRAMUSCULAR; INTRAVENOUS at 12:46

## 2025-08-01 RX ADMIN — TAMSULOSIN HYDROCHLORIDE 0.4 MG: 0.4 CAPSULE ORAL at 08:36

## 2025-08-01 RX ADMIN — OXYCODONE AND ACETAMINOPHEN 1 TABLET: 5; 325 TABLET ORAL at 15:17

## 2025-08-01 RX ADMIN — MORPHINE SULFATE 2 MG: 2 INJECTION, SOLUTION INTRAMUSCULAR; INTRAVENOUS at 12:46

## 2025-08-01 RX ADMIN — GABAPENTIN 100 MG: 100 CAPSULE ORAL at 15:17

## 2025-08-01 RX ADMIN — ERTAPENEM SODIUM 1000 MG: 1 INJECTION INTRAMUSCULAR; INTRAVENOUS at 15:16

## 2025-08-01 RX ADMIN — SODIUM CHLORIDE, PRESERVATIVE FREE 10 ML: 5 INJECTION INTRAVENOUS at 08:36

## 2025-08-01 RX ADMIN — MORPHINE SULFATE 2 MG: 2 INJECTION, SOLUTION INTRAMUSCULAR; INTRAVENOUS at 04:06

## 2025-08-01 RX ADMIN — POLYETHYLENE GLYCOL 3350 17 G: 17 POWDER, FOR SOLUTION ORAL at 08:36

## 2025-08-01 RX ADMIN — MEROPENEM 1000 MG: 1 INJECTION INTRAVENOUS at 00:02

## 2025-08-01 RX ADMIN — GABAPENTIN 100 MG: 100 CAPSULE ORAL at 08:36

## 2025-08-01 ASSESSMENT — PAIN SCALES - GENERAL
PAINLEVEL_OUTOF10: 10
PAINLEVEL_OUTOF10: 5
PAINLEVEL_OUTOF10: 7
PAINLEVEL_OUTOF10: 3
PAINLEVEL_OUTOF10: 0
PAINLEVEL_OUTOF10: 10

## 2025-08-01 ASSESSMENT — PAIN DESCRIPTION - LOCATION
LOCATION: KNEE

## 2025-08-01 ASSESSMENT — PAIN DESCRIPTION - DESCRIPTORS
DESCRIPTORS: THROBBING
DESCRIPTORS: CRAMPING;CRUSHING
DESCRIPTORS: STABBING

## 2025-08-01 ASSESSMENT — PAIN DESCRIPTION - ORIENTATION
ORIENTATION: RIGHT;LEFT

## 2025-08-01 NOTE — PROGRESS NOTES
Retail pharmacy has been notified that the patient is either going to a SNF, ARU, or other inpatient facility.     All prescriptions sent to the retail pharmacy for this patient will be kept on profile unless told otherwise.    08/01/25 10:37 AM

## 2025-08-01 NOTE — CARE COORDINATION
Pre-cert unable to be initiated, per Jan \"the member is not eligible for post-acute care benefits\". CM phoned Jan and they note that patients [address] policy termed on July 31, 2025.   CM following patient notified

## 2025-08-01 NOTE — PROGRESS NOTES
Comprehensive Nutrition Assessment    Type and Reason for Visit:  Reassess    Nutrition Recommendations/Plan:   Modify diet to easy to chew diet; 1500ml fluid restriction   Continue Ensure Plus ONS TID (vanilla)   Encourage and monitor po intake   Monitor weight and fluid status      Malnutrition Assessment:  Malnutrition Status:  At risk for malnutrition (08/01/25 1505)    Context:  Acute Illness     Findings of the 6 clinical characteristics of malnutrition:  Energy Intake:  75% or less of estimated energy requirements for 7 or more days  Weight Loss:  Unable to assess (weight fluctuations)     Body Fat Loss:  No body fat loss     Muscle Mass Loss:  No muscle mass loss Clavicles (pectoralis & deltoids)  Fluid Accumulation:  Mild Extremities   Strength:  Not Performed    Nutrition Assessment:    Follow-up: Patient has fair appetite with 51-75% intake of most meals. Encourage patient to increase po intake as tolerated. Patient reported some foods are harder to chew d/t patient leaving dentures at nursing home.  Modify diet to easy to chew to help increase po intake. Continue Ensure Plus ONS TID (vanilla) per patient request. Will continue to monitor.    Nutrition Related Findings:    (H), Na 132(L), creatinine 0.3(L). Edema: LUE +1; RLE,LLE +2 pitting. Last BM 7/31. Wound Type: Pressure Injury, Stage IV (medial back)       Current Nutrition Intake & Therapies:       Average Supplements Intake: None Ordered  ADULT ORAL NUTRITION SUPPLEMENT; Breakfast, Lunch, Dinner; Standard High Calorie/High Protein Oral Supplement  ADULT DIET; Easy to Chew; 1500 ml    Anthropometric Measures:  Height: 162.6 cm (5' 4.02\")  Ideal Body Weight (IBW): 120 lbs (55 kg)    Admission Body Weight: 64.9 kg (143 lb 1.3 oz)  Current Body Weight: 66 kg (145 lb 8.1 oz), 120 % IBW. Weight Source: Bed scale  Current BMI (kg/m2): 25           Weight Adjustment For: No Adjustment                 BMI Categories: Normal Weight (BMI 22.0 to

## 2025-08-01 NOTE — CARE COORDINATION
8/1 PLAN: Return to [facility] ([facility] Pending)-requested Jeannine HODGES precert for IV ABX and wound care at discharge/if denied, can return [facility] pending. Bedbound. BLS transport. Electronically signed by Clotilde Rader RN on 8/1/2025 at 9:31 AM

## 2025-08-01 NOTE — PROGRESS NOTES
Patient ID: Radha Carcamo  Referring/ Physician: Batool Chandra MD      Summary:   Radha Carcamo is being seen by nephrology for hyponatremia .     Reason for admission: presented with blood in stool. Found to have very large stool burden throughout the colon and rectum.  Wall thickening  of the rectum may relate to stercoral proctitis.    Hyponatremia noted on admission, asymptomatic with Na 121 initially.       Interval Hx:     Sodium was 125 on admission now 122  Potassium 3.7, creatinine very low  Has longstanding hyponatremia  Currently on fluid restriction and potassium supplement    Assessment/Plan:   Continue with potassium supplement  No indication to change the medication for sodium at this time      Recurrent Hhyponatremia.  In the past did not respond to tolvaptan as per my partner's note   Sodium 121.  124--129 and 5/28/2025.  130  2014  UA specific gravity 1032  Echo 4/2025 normal EF  Albumin 2.6 in the past normal LFTs     Acute GI bleeding  #Bright red blood per rectum  - Hemoglobin 9.3,   CTA of abdominal pelvic with contrast no CT findings of acute GI hemorrhage.      #Constipation  - CT of abdominal pelvic with contrast, Very large stool burden throughout the colon and rectum may relate to stercoral proctitis.  - Will provide 1 dose of lactulose and continue home regimen     #Sacral wound currently not infected  #Chronic back pain secondary to above  - Will consult wound care and continue home medication for pain     #Paroxysmal atrial fibrillation   #Bilateral lower extremity swelling  -Stable currently will hold Lasix due to hyponatremia.  Will hold Eliquis due to GI bleeding  -2D echo 5/19/2025: Echo with preserved LVEF, disease  - Continue amiodarone and metoprolo              [facility] would like to thank you for the opportunity to serve this patient. Please call with any questions or concerns.    Mookie Cabrera MD  [facility]  [id number]  07/30/25  1300 07/31/25  0545 08/01/25  0506   WBC 5.2  --  5.8 5.5   HGB 7.2* 8.3* 8.2* 8.3*   HCT 21.4* 23.8* 25.1* 24.7*     --  461* 471*     BMP:    Recent Labs     07/30/25  0519 07/31/25  0545 08/01/25  0506   * 132* 132*   K  --  3.6 3.7   CL  --  96* 96*   CO2  --  27 27   BUN  --  6* 9   CREATININE  --  <0.2* 0.3*   GLUCOSE  --  101* 91     Lab Results   Component Value Date/Time    COLORU Yellow 07/26/2025 05:49 AM    NITRU POSITIVE 07/26/2025 05:49 AM    GLUCOSEU Negative 07/26/2025 05:49 AM    KETUA Negative 07/26/2025 05:49 AM    UROBILINOGEN 0.2 07/26/2025 05:49 AM    BILIRUBINUR Negative 07/26/2025 05:49 AM

## 2025-08-01 NOTE — PROGRESS NOTES
Received consult that patient failed voiding trial today, lake was replaced.  Spoke with bedside nurse, plan is for patient to be discharged today.   I gave office information to nurse and instructed them to set up voiding trial outpatient with our office next week.

## 2025-08-01 NOTE — CARE COORDINATION
8/1 Discharge noted. Need PICC, IV ABX, New Urology Consult. Toño HODGES termed on 7/31/25. Pt. will return pending [facility]. Need BLS transport-bedbound. Electronically signed by Clotilde Rader RN on 8/1/2025 at 12:10 PM

## 2025-08-01 NOTE — CARE COORDINATION
DISCHARGE SUMMARY     DATE OF DISCHARGE: 8/1/25    DISCHARGE DESTINATION: LTC    FACILITY  [facility]  [address] [id number]  Phone: [phone]  Fax: [phone]     Level of Care: Long Term    Report Number: [phone]    Fax Number:  [phone]    Precert Obtained: N/A    Hens Completed: N/A    PASARR: N/A    Notified: RN, Family, and Facility/Agency    Prescriptions Faxed:no    HEMODIALYSIS: No    TRANSPORTATION: Stretcher    Company Name: [employer]     Time: 6:30 PM    Phone Number: [phone]    NEW DME ORDERED: no    COMMENTS: Notified Mike OCHOA) of discharge plan. Unable to reach son Pradeep Carcamo.Electronically signed by Clotilde Rader RN on 8/1/2025 at 3:22 PM

## 2025-08-01 NOTE — PROGRESS NOTES
Picc placement note:  Dynamic Access RN procedure    Consent signed and obtained by proceduralist, from Patient . See consent form in paper chart.    Prescribed IV Therapy = Home ABX  Peripheral ultrasound assessment done. Plan for right Brachial vein insertion.   CVR measurement = 30 % (Linear CVR is preferred to be less than 45%).  Product type: Bard 4 fr single lumen Power PICC.  History/Labs/Allergies Reviewed  Placed By: Quinn Ernst - RN (Dynamic Access)  Time out Performed using Two Identifiers  Lot # [id number]  Expiration date = 06302026  Trimmed at 37 cm total  External catheter length 1 cm  Number of attempts 1  Special equipment used- Bard 3cg tip confirmation system, ultrasound, and micro-introducer (MST) technique   Catheter securement = adhesive 3M securement device  Dressing applied= Tegaderm CHG  Lidocaine administered intradermally conc.1%, approx 1 ml (Lidocaine Lot# - [id number] and Exp date - 03012027 )    Sandy RN aware picc placed with ECG technology and is confirmed in the distal 1/3 SVC. Picc is immediately released for use. Rn aware new iv tubing required.     PICC education:     Discussed with patient prior to procedure.  Risks and Benefits along with reason for procedure were discussed and teaching was reinforced with an education handout on line  insertion. [facility] FAQ Catheter Associated Blood Stream Infections and [facility] [id number] REV. 7/13 Nursing and Booklet left at bedside or in chart. Patient (Family or POA) acknowledged understanding of information taught and agreed to procedure.

## 2025-08-01 NOTE — PLAN OF CARE
Problem: Discharge Planning  Goal: Discharge to home or other facility with appropriate resources  Outcome: Progressing     Problem: Pain  Goal: Verbalizes/displays adequate comfort level or baseline comfort level  Outcome: Progressing     Problem: Skin/Tissue Integrity  Goal: Skin integrity remains intact  Description: 1.  Monitor for areas of redness and/or skin breakdown  2.  Assess vascular access sites hourly  3.  Every 4-6 hours minimum:  Change oxygen saturation probe site  4.  Every 4-6 hours:  If on nasal continuous positive airway pressure, respiratory therapy assess nares and determine need for appliance change or resting period  Outcome: Progressing     Problem: Safety - Adult  Goal: Free from fall injury  Outcome: Progressing     Problem: ABCDS Injury Assessment  Goal: Absence of physical injury  Outcome: Progressing     Problem: Neurosensory - Adult  Goal: Achieves stable or improved neurological status  Outcome: Progressing  Goal: Achieves maximal functionality and self care  Outcome: Progressing     Problem: Respiratory - Adult  Goal: Achieves optimal ventilation and oxygenation  Outcome: Progressing     Problem: Cardiovascular - Adult  Goal: Maintains optimal cardiac output and hemodynamic stability  Outcome: Progressing  Goal: Absence of cardiac dysrhythmias or at baseline  Outcome: Progressing     Problem: Skin/Tissue Integrity - Adult  Goal: Skin integrity remains intact  Description: 1.  Monitor for areas of redness and/or skin breakdown  2.  Assess vascular access sites hourly  3.  Every 4-6 hours minimum:  Change oxygen saturation probe site  4.  Every 4-6 hours:  If on nasal continuous positive airway pressure, respiratory therapy assess nares and determine need for appliance change or resting period  Outcome: Progressing  Goal: Incisions, wounds, or drain sites healing without S/S of infection  Outcome: Progressing  Goal: Oral mucous membranes remain intact  Outcome: Progressing    precautions, as indicated  3. Provide frequent short contacts to provide reality reorientation, refocusing and direction  4. Decrease environmental stimuli, including noise as appropriate  5. Monitor and intervene to maintain adequate nutrition, hydration, elimination, sleep and activity  6. If unable to ensure safety without constant attention obtain sitter and review sitter guidelines with assigned personnel  7. Initiate Psychosocial CNS and Spiritual Care consult, as indicated  Outcome: Progressing     Problem: Behavior  Goal: Pt/Family maintain appropriate behavior and adhere to behavioral management agreement, if implemented  Description: INTERVENTIONS:  1. Assess patient/family's coping skills and  non-compliant behavior (including use of illegal substances)  2. Notify security of behavior or suspected illegal substances which indicate the need for search of the family and/or belongings  3. Encourage verbalization of thoughts and concerns in a socially appropriate manner  4. Utilize positive, consistent limit setting strategies supporting safety of patient, staff and others  5. Encourage participation in the decision making process about the behavioral management agreement  6. If a visitor's behavior poses a threat to safety call refer to organization policy.  7. Initiate consult with , Psychosocial CNS, Spiritual Care as appropriate  Outcome: Progressing     Problem: Nutrition Deficit:  Goal: Optimize nutritional status  Outcome: Progressing

## 2025-08-01 NOTE — PROGRESS NOTES
This nurse attempt x3 to contact Shanice to give report for pt returning first number I called [phone] which was  transferred me to nurses station which no one answered but also gave me pt floor nurse main number [phone] which nurse still didn't answer.

## 2025-08-01 NOTE — DISCHARGE INSTR - COC
Continuity of Care Form    Patient Name: Radha Carcamo   :  1945  MRN:  [id number]    Admit date:  2025  Discharge date:  25    Code Status Order: Full Code   Advance Directives:    Date/Time Healthcare Directive Type of Healthcare Directive Copy in Chart Healthcare Agent Appointed Healthcare Agent's Name Healthcare Agent's Phone Number    25 0531 No, patient does not have an advance directive for healthcare treatment  --  --  --  --  --     25 0550 No, patient does not have an advance directive for healthcare treatment  --  --  --  --  --             Admitting Physician:  Teresita Vincent MD  PCP: Madai Morocho APRN - NP    Discharging Nurse: Noah IVERSON  Discharging Hospital Unit/Room#: W1X-0819/4126-01  Discharging Unit Phone Number: [phone]    Emergency Contact:   Extended Emergency Contact Information  Primary Emergency Contact: Mike Christensen  Home Phone: [phone]  Mobile Phone: [phone]  Relation: Other  Secondary Emergency Contact: ShanellePradeep mejia  Address: [address] [place] [address]  Home Phone: [phone]  Work Phone: [phone]  Mobile Phone: [phone]  Relation: Child    Past Surgical History:  Past Surgical History:   Procedure Laterality Date    COLONOSCOPY N/A 2025    COLONOSCOPY performed by Kulwinder Agudelo MD at [facility] ENDOSCOPY    EYE SURGERY      cataract ou    RECTAL SURGERY N/A 2025    EXCISIONAL DEBRIDEMENT SACRAL ULCER performed by Milo Champion MD at [facility] OR    UPPER GASTROINTESTINAL ENDOSCOPY N/A 2025    ESOPHAGOGASTRODUODENOSCOPY BIOPSY performed by Kulwinder Agudelo MD at [facility] ENDOSCOPY       Immunization History:   Immunization History   Administered Date(s) Administered    COVID-19, MODERNA, , (age 12y+), IM, 50mcg/0.5mL 2023    COVID-19, PFIZER Bivalent, DO NOT Dilute, (age 12y+), IM, 30 mcg/0.3 mL 2022    COVID-19, PFIZER GRAY top, DO NOT

## 2025-08-01 NOTE — PROGRESS NOTES
Pt was bladder scanned due to no urine output noted from 7am-11am, bladder scan amount >999. This nurse notified MD Chandra due to urinary retention. MD consulted Urology. This nurse reinserted 16F Keenan Cath with 10ml balloon. Yellow flowing urine noted with output of 1200cc. This nurse contacted Urology for consult and will f/u with pt in 1week. Okay to discharge with Keenan Cath.

## 2025-08-01 NOTE — PROGRESS NOTES
Pt bladder scan showed greater than 999.  Actively urinating while repositioning.  Straight cath performed x1.  Obtained 700ml urine output.  Urine clear, yellow with no odor.  Will pass on to day shift RN.

## 2025-08-01 NOTE — DISCHARGE INSTR - DIET

## 2025-08-01 NOTE — PROGRESS NOTES
ear drainage,nasal congestion  Respiratory:  negative for cough, shortness of breath or hemoptysis   Cardiovascular:  negative for chest pain, palpitations, syncope  Gastrointestinal:  negative for nausea, vomiting, diarrhea, constipation, abdominal pain  Genitourinary:  negative for frequency, dysuria, urinary incontinence, hematuria  Hematologic/Lymphatic:  negative for easy bruising, bleeding and lymphadenopathy  Allergic/Immunologic:  negative for recurrent infections, angioedema, anaphylaxis   Endocrine:  negative for weight changes, polyuria, polydipsia and polyphagia  Musculoskeletal: Sacral decubitus ulcer pain, swelling, decreased range of motion  Integumentary: No rashes, skin lesions  Neurological:  negative for headaches, slurred speech, unilateral weakness  Psychiatric: negative for hallucinations,confusion,agitation.     PHYSICAL EXAM:      Vitals:    /76   Pulse 75   Temp 98.1 °F (36.7 °C) (Oral)   Resp 16   Ht 1.626 m (5' 4.02\")   Wt 66 kg (145 lb 8.1 oz)   SpO2 98%   BMI 24.96 kg/m²     General Appearance: alert,in some  acute distress, + pallor, no icterus   Skin: warm and dry, no rash or erythema  Head: normocephalic and atraumatic  Eyes: pupils equal, round, and reactive to light, conjunctivae normal  ENT: tympanic membrane, external ear and ear canal normal bilaterally, nose without deformity, nasal mucosa and turbinates normal without polyps  Neck: supple and non-tender without mass, no thyromegaly  no cervical lymphadenopathy  Pulmonary/Chest: clear to auscultation bilaterally- no wheezes, rales or rhonchi, normal air movement, no respiratory distress  Cardiovascular: normal rate, regular rhythm, normal S1 and S2, no murmurs, rubs, clicks, or gallops, no carotid bruits  Abdomen: soft, non-tender, non-distended, normal bowel sounds, no masses or organomegaly  Extremities: no cyanosis, clubbing or edema  Musculoskeletal: normal range of motion, no joint swelling, deformity or  contusion S00.93XA    Paroxysmal atrial fibrillation (HCC) I48.0    Bacteroides infection A49.8    MRSA (methicillin resistant Staphylococcus aureus) infection A49.02    Acute GI bleeding K92.2    Rectal bleeding K62.5    Acute abdominal pain R10.9    Iron deficiency anemia D50.9    Acute cystitis without hematuria N30.00    Sacral osteomyelitis (HCC) M46.28    Constipation K59.00    Chronic anticoagulation Z79.01    UTI due to extended-spectrum beta lactamase (ESBL) producing Escherichia coli N39.0, B96.29, Z16.12        ICD-10-CM    1. Rectal bleeding  K62.5       2. Hyponatremia  E87.1       3. Acute abdominal pain  R10.9       4. Pressure injury of skin of sacral region, unspecified injury stage  L89.159 oxyCODONE-acetaminophen (PERCOCET) 5-325 MG per tablet      5. Iron deficiency anemia, unspecified iron deficiency anemia type  D50.9 Surgical Pathology     Surgical Pathology         Acute abdominal pain [R10.9]  Hyponatremia [E87.1]  Rectal bleeding [K62.5]  Pressure injury of skin of sacral region, unspecified injury stage [L89.159]    Admitted to hospital with rectal bleeding  Status post upper GI been ordered a sliding hiatal large fecal disimpaction  Noted diverticulosis on colonoscopy  MDRO infection  ESBL E. coli UTI  Sacral decubitus ulcer  History of MRSA  CT abdomen pelvis indicating very large stool burden colonic diverticulosis and bladder wall thickening  Chest x-ray negative  Chronic hyponatremia  Chronic anticoagulation      The complicated urinary tract infection with stercoral colitis severe constipation requiring disimpaction colonoscopy will need IV antibiotics.  Sacral decubitus ulcer will need ongoing local wound care    Given MDRO will need IV antibiotics okay for PICC line placement will update YASEMIN    Labs, Microbiology, Radiology and pertinent results from current hospitalization and care every where were reviewed by me as a part of the consultation.    PLAN :  Changed to IV ertapenem 1  g every 24 x  stop date x  8/14  Continue wound care  Contact isolation  Sacral cubitus ulcer need offloading and local wound care  Severe constipation disimpaction  Continue laxatives  Will need IV antibiotic to finish the course secondary to ESBL E. coli UTI  On chronic anticoagulation  Hb now stable   YASEMIN done     Discussed with patient/Family and Nursing  d/w primary team     Medical Decision Making:  The following items were considered in medical decision making:  Discussion of patient care with other providers  Reviewed clinical lab tests  Reviewed radiology tests  Reviewed other diagnostic tests/interventions  Independent review of radiologic images  Independent review of  Microbiology cultures and other micro tests reviewed       Risk of Complications/Morbidity: High      Illness(es)/ Infection present that pose threat to bodily function.   There is potential for severe exacerbation of infection/side effects of treatment.  Therapy requires intensive monitoring for antimicrobial agent toxicity.  Review of Antibiotic resistance patterns and lab results  Management decisions including changes in Antimicrobial therapy and infection control strategies  Coordination with Micro lab, public health agencies or interdisciplinary teams      Thanks for allowing me to participate in your patient's care please call me with any questions or concerns.    Dr. Christopher Negrete MD  Infectious Disease  [facility] Physician  Phone: [phone]   Fax : [phone]

## 2025-08-01 NOTE — PLAN OF CARE
Problem: Discharge Planning  Goal: Discharge to home or other facility with appropriate resources  8/1/2025 1302 by Sandy Sheikh RN  Outcome: Progressing  8/1/2025 0448 by Sylvester Amos RN  Outcome: Progressing     Problem: Pain  Goal: Verbalizes/displays adequate comfort level or baseline comfort level  8/1/2025 1302 by Sandy Sheikh RN  Outcome: Progressing  8/1/2025 0448 by Sylvester Amos RN  Outcome: Progressing     Problem: Skin/Tissue Integrity  Goal: Skin integrity remains intact  Description: 1.  Monitor for areas of redness and/or skin breakdown  2.  Assess vascular access sites hourly  3.  Every 4-6 hours minimum:  Change oxygen saturation probe site  4.  Every 4-6 hours:  If on nasal continuous positive airway pressure, respiratory therapy assess nares and determine need for appliance change or resting period  8/1/2025 1302 by Sandy Sheikh RN  Outcome: Progressing  8/1/2025 0448 by Sylvester Amos RN  Outcome: Progressing     Problem: Safety - Adult  Goal: Free from fall injury  8/1/2025 1302 by Sandy Sheikh RN  Outcome: Progressing  8/1/2025 0448 by Sylvester Amos RN  Outcome: Progressing     Problem: ABCDS Injury Assessment  Goal: Absence of physical injury  8/1/2025 1302 by Sandy Sheikh RN  Outcome: Progressing  8/1/2025 0448 by Sylvester Amos RN  Outcome: Progressing     Problem: Neurosensory - Adult  Goal: Achieves stable or improved neurological status  8/1/2025 1302 by Sandy Sheikh RN  Outcome: Progressing  8/1/2025 0448 by Sylvester Amos RN  Outcome: Progressing  Goal: Achieves maximal functionality and self care  8/1/2025 1302 by Sandy Sheikh RN  Outcome: Progressing  8/1/2025 0448 by Sylvester Amos RN  Outcome: Progressing     Problem: Respiratory - Adult  Goal: Achieves optimal ventilation and oxygenation  8/1/2025 1302 by Sandy Sheikh RN  Outcome: Progressing  8/1/2025 0448 by Sylvester Amos RN  Outcome: Progressing     Problem:

## 2025-08-04 ENCOUNTER — TELEPHONE (OUTPATIENT)
Dept: INFECTIOUS DISEASES | Age: 80
End: 2025-08-04

## 2025-08-04 DIAGNOSIS — B96.29 UTI DUE TO EXTENDED-SPECTRUM BETA LACTAMASE (ESBL) PRODUCING ESCHERICHIA COLI: Primary | ICD-10-CM

## 2025-08-04 DIAGNOSIS — N39.0 UTI DUE TO EXTENDED-SPECTRUM BETA LACTAMASE (ESBL) PRODUCING ESCHERICHIA COLI: Primary | ICD-10-CM

## 2025-08-04 DIAGNOSIS — Z16.12 UTI DUE TO EXTENDED-SPECTRUM BETA LACTAMASE (ESBL) PRODUCING ESCHERICHIA COLI: Primary | ICD-10-CM

## 2025-08-05 ENCOUNTER — TELEPHONE (OUTPATIENT)
Dept: INFECTIOUS DISEASES | Age: 80
End: 2025-08-05

## 2025-08-06 NOTE — PROGRESS NOTES
Physician Progress Note      PATIENT:               RODY RAMOS  CSN #:                  [id number]  :                       1945  ADMIT DATE:       2025 3:32 PM  DISCH DATE:        2025 6:45 PM  RESPONDING  PROVIDER #:        Batool Chandra MD          QUERY TEXT:      Gastrointestinal bleeding is documented in the medical record? of H&P on   2025 by Teresita Vincent MD.? Please specify the underlying cause:    The clinical indicators include:  This is a 79? y.o. Female who presents with Rectal bleeding.  Diverticulosis,Hemorrhoids,Colitis,Constipation  Acute GI bleeding,Bright red blood per rectum,Hemoglobin 9.3, hematocrit 27.8   baseline hemoglobin 9.2-9.5 appears stable is documented in the medical   record? of H&P on 2025 by Teresiat Vincent MD  Admitted to hospital with rectal bleeding Status post upper GI been ordered a   sliding hiatal large fecal disimpaction  Noted diverticulosis on colonoscopy? documented in? ID PN on  by Christopher Negrete MD  Endoscopy report on  shows Colon: ?Mild diverticulosis in the left colon.   ?Mild stercoral proctitis without ulcer or bleeding lesion,Retroflexed view of   the rectum: Grade 1 internal hemorrhoids    -EGD, Colonoscopy , pantoprazole (PROTONIX) 40 mg,ferrous sulfate (IRON 325)   tablet 325 mg,Hold eliquis, iron sucrose (VENOFER) 200 mg, metronidazole   (FLAGYL) 500 mg? ,meropenem (MERREM) 1,000 mg(EPIC MAR)    Thank you  IMELDA Naik ,CDS  Options provided:  -- GI bleeding related to Diverticulosis  -- GI bleeding related to Stercoral colitis  -- GI bleeding related to hemorrhoids  -- Other - I will add my own diagnosis  -- Disagree - Not applicable / Not valid  -- Disagree - Clinically unable to determine / Unknown  -- Refer to Clinical Documentation Reviewer    PROVIDER RESPONSE TEXT:    This patient has GI bleeding related to Diverticulosis    Query created by: Shruthi Milligan on 2025

## 2025-08-13 ENCOUNTER — TELEPHONE (OUTPATIENT)
Dept: INFECTIOUS DISEASES | Age: 80
End: 2025-08-13

## 2025-08-13 LAB
ALBUMIN: 3.5 G/DL
ALP BLD-CCNC: 65 U/L
ALT SERPL-CCNC: 19 U/L
ANION GAP SERPL CALCULATED.3IONS-SCNC: ABNORMAL MMOL/L
AST SERPL-CCNC: 17 U/L
BASOPHILS ABSOLUTE: 0.1 /ΜL
BASOPHILS RELATIVE PERCENT: 1.1 %
BILIRUB SERPL-MCNC: 0.3 MG/DL (ref 0.1–1.4)
BUN BLDV-MCNC: 25 MG/DL
C-REACTIVE PROTEIN: 2
CALCIUM SERPL-MCNC: 9.7 MG/DL
CHLORIDE BLD-SCNC: 94 MMOL/L
CO2: 26 MMOL/L
CREAT SERPL-MCNC: 0.3 MG/DL
EOSINOPHILS ABSOLUTE: 0.2 /ΜL
EOSINOPHILS RELATIVE PERCENT: 4 %
GFR, ESTIMATED: ABNORMAL
GLUCOSE BLD-MCNC: 77 MG/DL
HCT VFR BLD CALC: 29.4 % (ref 36–46)
HEMOGLOBIN: 9.8 G/DL (ref 12–16)
LYMPHOCYTES ABSOLUTE: 1.6 /ΜL
LYMPHOCYTES RELATIVE PERCENT: 31.5 %
MCH RBC QN AUTO: 31.2 PG
MCHC RBC AUTO-ENTMCNC: 33.3 G/DL
MCV RBC AUTO: 93.8 FL
MONOCYTES ABSOLUTE: 0.5 /ΜL
MONOCYTES RELATIVE PERCENT: 10.3 %
NEUTROPHILS ABSOLUTE: 2.6 /ΜL
NEUTROPHILS RELATIVE PERCENT: 53.1 %
PDW BLD-RTO: 17.1 %
PLATELET # BLD: 438 K/ΜL
PMV BLD AUTO: 7.5 FL
POTASSIUM SERPL-SCNC: 4.6 MMOL/L
RBC # BLD: 3.14 10^6/ΜL
SED RATE, AUTOMATED: 28
SODIUM BLD-SCNC: 135 MMOL/L
TOTAL PROTEIN: 6.3 G/DL (ref 6.4–8.2)
WBC # BLD: 5 10^3/ML

## 2025-08-14 DIAGNOSIS — L89.153 DECUBITUS ULCER OF SACRAL REGION, STAGE 3 (HCC): ICD-10-CM

## 2025-08-14 DIAGNOSIS — N39.0 UTI DUE TO EXTENDED-SPECTRUM BETA LACTAMASE (ESBL) PRODUCING ESCHERICHIA COLI: ICD-10-CM

## 2025-08-14 DIAGNOSIS — B96.29 UTI DUE TO EXTENDED-SPECTRUM BETA LACTAMASE (ESBL) PRODUCING ESCHERICHIA COLI: ICD-10-CM

## 2025-08-14 DIAGNOSIS — Z16.12 UTI DUE TO EXTENDED-SPECTRUM BETA LACTAMASE (ESBL) PRODUCING ESCHERICHIA COLI: ICD-10-CM

## 2025-08-15 ENCOUNTER — TELEPHONE (OUTPATIENT)
Dept: INFECTIOUS DISEASES | Age: 80
End: 2025-08-15

## 2025-08-17 ENCOUNTER — HOSPITAL ENCOUNTER (EMERGENCY)
Age: 80
Discharge: HOME | End: 2025-08-18
Payer: MEDICARE

## 2025-08-17 VITALS
DIASTOLIC BLOOD PRESSURE: 77 MMHG | HEART RATE: 70 BPM | RESPIRATION RATE: 14 BRPM | TEMPERATURE: 97.8 F | OXYGEN SATURATION: 97 % | WEIGHT: 137.79 LBS | HEIGHT: 64 IN | SYSTOLIC BLOOD PRESSURE: 144 MMHG | BODY MASS INDEX: 23.52 KG/M2

## 2025-08-17 DIAGNOSIS — T83.9XXA FOLEY CATHETER PROBLEM, INITIAL ENCOUNTER: Primary | ICD-10-CM

## 2025-08-17 DIAGNOSIS — N39.0 ACUTE UTI: ICD-10-CM

## 2025-08-17 LAB
AMORPH SED URNS QL MICRO: ABNORMAL /HPF
BACTERIA URNS QL MICRO: ABNORMAL /HPF
BILIRUB UR QL STRIP.AUTO: NEGATIVE
CHARACTER UR: ABNORMAL
CLARITY UR: ABNORMAL
COLOR UR: ABNORMAL
CRYSTALS URNS MICRO: ABNORMAL /HPF
CRYSTALS URNS MICRO: ABNORMAL /HPF
EPI CELLS #/AREA URNS HPF: ABNORMAL /HPF (ref 0–5)
GLUCOSE UR STRIP.AUTO-MCNC: NEGATIVE MG/DL
HGB UR QL STRIP.AUTO: ABNORMAL
HYALINE CASTS #/AREA URNS LPF: ABNORMAL /LPF (ref 0–2)
KETONES UR STRIP.AUTO-MCNC: NEGATIVE MG/DL
LEUKOCYTE ESTERASE UR QL STRIP.AUTO: ABNORMAL
NITRITE UR QL STRIP.AUTO: NEGATIVE
PH UR STRIP.AUTO: 8.5 [PH] (ref 5–8)
PROT UR STRIP.AUTO-MCNC: 30 MG/DL
RBC #/AREA URNS HPF: ABNORMAL /HPF (ref 0–4)
SP GR UR STRIP.AUTO: 1.01 (ref 1–1.03)
UA COMPLETE W REFLEX CULTURE PNL UR: ABNORMAL
UA DIPSTICK W REFLEX MICRO PNL UR: YES
URN SPEC COLLECT METH UR: ABNORMAL
UROBILINOGEN UR STRIP-ACNC: 1 E.U./DL
WBC #/AREA URNS HPF: ABNORMAL /HPF (ref 0–5)

## 2025-08-17 PROCEDURE — 51798 US URINE CAPACITY MEASURE: CPT

## 2025-08-17 PROCEDURE — 99284 EMERGENCY DEPT VISIT MOD MDM: CPT

## 2025-08-17 PROCEDURE — 81001 URINALYSIS AUTO W/SCOPE: CPT

## 2025-08-17 PROCEDURE — 51702 INSERT TEMP BLADDER CATH: CPT

## 2025-08-17 PROCEDURE — 6370000000 HC RX 637 (ALT 250 FOR IP)

## 2025-08-17 RX ORDER — CEPHALEXIN 500 MG/1
500 CAPSULE ORAL 2 TIMES DAILY
Qty: 14 CAPSULE | Refills: 0 | Status: SHIPPED | OUTPATIENT
Start: 2025-08-17 | End: 2025-08-17

## 2025-08-17 RX ORDER — LIDOCAINE HYDROCHLORIDE 20 MG/ML
JELLY TOPICAL ONCE
Status: DISCONTINUED | OUTPATIENT
Start: 2025-08-17 | End: 2025-08-18 | Stop reason: HOSPADM

## 2025-08-17 RX ORDER — OXYCODONE AND ACETAMINOPHEN 5; 325 MG/1; MG/1
1 TABLET ORAL ONCE
Refills: 0 | Status: COMPLETED | OUTPATIENT
Start: 2025-08-17 | End: 2025-08-17

## 2025-08-17 RX ORDER — CEPHALEXIN 500 MG/1
500 CAPSULE ORAL 2 TIMES DAILY
Qty: 14 CAPSULE | Refills: 0 | Status: SHIPPED | OUTPATIENT
Start: 2025-08-17 | End: 2025-08-22

## 2025-08-17 RX ADMIN — OXYCODONE AND ACETAMINOPHEN 1 TABLET: 5; 325 TABLET ORAL at 12:07

## 2025-08-17 ASSESSMENT — PAIN DESCRIPTION - DESCRIPTORS
DESCRIPTORS: BURNING
DESCRIPTORS: SPASM

## 2025-08-17 ASSESSMENT — PAIN DESCRIPTION - LOCATION
LOCATION: ABDOMEN
LOCATION: KNEE

## 2025-08-17 ASSESSMENT — PAIN SCALES - GENERAL
PAINLEVEL_OUTOF10: 10
PAINLEVEL_OUTOF10: 5
PAINLEVEL_OUTOF10: 10

## 2025-08-17 ASSESSMENT — LIFESTYLE VARIABLES
HOW MANY STANDARD DRINKS CONTAINING ALCOHOL DO YOU HAVE ON A TYPICAL DAY: PATIENT DOES NOT DRINK
HOW OFTEN DO YOU HAVE A DRINK CONTAINING ALCOHOL: NEVER

## 2025-08-17 ASSESSMENT — PAIN - FUNCTIONAL ASSESSMENT
PAIN_FUNCTIONAL_ASSESSMENT: 0-10
PAIN_FUNCTIONAL_ASSESSMENT: ACTIVITIES ARE NOT PREVENTED
PAIN_FUNCTIONAL_ASSESSMENT: 0-10
PAIN_FUNCTIONAL_ASSESSMENT: ACTIVITIES ARE NOT PREVENTED

## 2025-08-17 ASSESSMENT — PAIN DESCRIPTION - ONSET: ONSET: ON-GOING

## 2025-08-17 ASSESSMENT — PAIN DESCRIPTION - PAIN TYPE: TYPE: ACUTE PAIN

## 2025-08-17 ASSESSMENT — PAIN DESCRIPTION - ORIENTATION
ORIENTATION: RIGHT;LEFT
ORIENTATION: LOWER

## 2025-08-17 ASSESSMENT — PAIN DESCRIPTION - FREQUENCY: FREQUENCY: CONTINUOUS

## 2025-08-18 ENCOUNTER — TELEPHONE (OUTPATIENT)
Dept: INFECTIOUS DISEASES | Age: 80
End: 2025-08-18

## 2025-08-21 ENCOUNTER — HOSPITAL ENCOUNTER (EMERGENCY)
Age: 80
Discharge: NURSING, REHAB OR LONG-TERM CARE | End: 2025-08-22
Attending: STUDENT IN AN ORGANIZED HEALTH CARE EDUCATION/TRAINING PROGRAM
Payer: MEDICARE

## 2025-08-21 ENCOUNTER — APPOINTMENT (OUTPATIENT)
Dept: CT IMAGING | Age: 80
End: 2025-08-21
Payer: MEDICARE

## 2025-08-21 DIAGNOSIS — E87.1 HYPONATREMIA: ICD-10-CM

## 2025-08-21 DIAGNOSIS — N39.0 URINARY TRACT INFECTION WITHOUT HEMATURIA, SITE UNSPECIFIED: Primary | ICD-10-CM

## 2025-08-21 LAB
ALBUMIN SERPL-MCNC: 3.8 G/DL (ref 3.4–5)
ALBUMIN/GLOB SERPL: 1.3 {RATIO} (ref 1.1–2.2)
ALP SERPL-CCNC: 87 U/L (ref 40–129)
ALT SERPL-CCNC: 18 U/L (ref 10–40)
AMORPH SED URNS QL MICRO: ABNORMAL /HPF
ANION GAP SERPL CALCULATED.3IONS-SCNC: 11 MMOL/L (ref 3–16)
AST SERPL-CCNC: 17 U/L (ref 15–37)
BACTERIA URNS QL MICRO: ABNORMAL /HPF
BASE EXCESS BLDV CALC-SCNC: 5.8 MMOL/L
BASOPHILS # BLD: 0 K/UL (ref 0–0.2)
BASOPHILS NFR BLD: 0.6 %
BILIRUB DIRECT SERPL-MCNC: <0.1 MG/DL (ref 0–0.3)
BILIRUB INDIRECT SERPL-MCNC: 0.2 MG/DL (ref 0–1)
BILIRUB SERPL-MCNC: 0.3 MG/DL (ref 0–1)
BILIRUB UR QL STRIP.AUTO: NEGATIVE
BUN SERPL-MCNC: 21 MG/DL (ref 7–20)
CALCIUM SERPL-MCNC: 10.1 MG/DL (ref 8.3–10.6)
CHARACTER UR: ABNORMAL
CHLORIDE SERPL-SCNC: 91 MMOL/L (ref 99–110)
CLARITY UR: ABNORMAL
CO2 BLDV-SCNC: 32 MMOL/L
CO2 SERPL-SCNC: 26 MMOL/L (ref 21–32)
COHGB MFR BLDV: 1.5 %
COLOR UR: YELLOW
CREAT SERPL-MCNC: 0.3 MG/DL (ref 0.6–1.2)
DEPRECATED RDW RBC AUTO: 15.8 % (ref 12.4–15.4)
EOSINOPHIL # BLD: 0.3 K/UL (ref 0–0.6)
EOSINOPHIL NFR BLD: 4.7 %
GFR SERPLBLD CREATININE-BSD FMLA CKD-EPI: >90 ML/MIN/{1.73_M2}
GLUCOSE SERPL-MCNC: 89 MG/DL (ref 70–99)
GLUCOSE UR STRIP.AUTO-MCNC: NEGATIVE MG/DL
HCO3 BLDV-SCNC: 31 MMOL/L (ref 23–29)
HCT VFR BLD AUTO: 30.2 % (ref 36–48)
HGB BLD-MCNC: 9.9 G/DL (ref 12–16)
HGB UR QL STRIP.AUTO: ABNORMAL
KETONES UR STRIP.AUTO-MCNC: NEGATIVE MG/DL
LACTATE BLDV-SCNC: 1.5 MMOL/L (ref 0.4–1.9)
LEUKOCYTE ESTERASE UR QL STRIP.AUTO: ABNORMAL
LIPASE SERPL-CCNC: 27 U/L (ref 13–60)
LYMPHOCYTES # BLD: 1.8 K/UL (ref 1–5.1)
LYMPHOCYTES NFR BLD: 27.5 %
MAGNESIUM SERPL-MCNC: 1.86 MG/DL (ref 1.8–2.4)
MCH RBC QN AUTO: 29.8 PG (ref 26–34)
MCHC RBC AUTO-ENTMCNC: 32.9 G/DL (ref 31–36)
MCV RBC AUTO: 90.6 FL (ref 80–100)
METHGB MFR BLDV: 0.6 %
MONOCYTES # BLD: 0.6 K/UL (ref 0–1.3)
MONOCYTES NFR BLD: 9.1 %
MUCOUS THREADS #/AREA URNS LPF: ABNORMAL /LPF
NEUTROPHILS # BLD: 3.8 K/UL (ref 1.7–7.7)
NEUTROPHILS NFR BLD: 58.1 %
NITRITE UR QL STRIP.AUTO: NEGATIVE
O2 THERAPY: ABNORMAL
PCO2 BLDV: 44.2 MMHG (ref 40–50)
PH BLDV: 7.45 [PH] (ref 7.35–7.45)
PH UR STRIP.AUTO: 7.5 [PH] (ref 5–8)
PLATELET # BLD AUTO: 346 K/UL (ref 135–450)
PMV BLD AUTO: 7.4 FL (ref 5–10.5)
PO2 BLDV: 41 MMHG
POTASSIUM SERPL-SCNC: 4.3 MMOL/L (ref 3.5–5.1)
PROT SERPL-MCNC: 6.7 G/DL (ref 6.4–8.2)
PROT UR STRIP.AUTO-MCNC: ABNORMAL MG/DL
RBC # BLD AUTO: 3.33 M/UL (ref 4–5.2)
RBC #/AREA URNS HPF: ABNORMAL /HPF (ref 0–4)
SAO2 % BLDV: 71 %
SODIUM SERPL-SCNC: 128 MMOL/L (ref 136–145)
SP GR UR STRIP.AUTO: 1.01 (ref 1–1.03)
UA COMPLETE W REFLEX CULTURE PNL UR: YES
UA DIPSTICK W REFLEX MICRO PNL UR: YES
URN SPEC COLLECT METH UR: ABNORMAL
UROBILINOGEN UR STRIP-ACNC: 0.2 E.U./DL
WBC # BLD AUTO: 6.5 K/UL (ref 4–11)
WBC #/AREA URNS HPF: >100 /HPF (ref 0–5)

## 2025-08-21 PROCEDURE — 83605 ASSAY OF LACTIC ACID: CPT

## 2025-08-21 PROCEDURE — 87186 SC STD MICRODIL/AGAR DIL: CPT

## 2025-08-21 PROCEDURE — 87086 URINE CULTURE/COLONY COUNT: CPT

## 2025-08-21 PROCEDURE — 6360000004 HC RX CONTRAST MEDICATION: Performed by: STUDENT IN AN ORGANIZED HEALTH CARE EDUCATION/TRAINING PROGRAM

## 2025-08-21 PROCEDURE — 83690 ASSAY OF LIPASE: CPT

## 2025-08-21 PROCEDURE — 80053 COMPREHEN METABOLIC PANEL: CPT

## 2025-08-21 PROCEDURE — 82803 BLOOD GASES ANY COMBINATION: CPT

## 2025-08-21 PROCEDURE — 81001 URINALYSIS AUTO W/SCOPE: CPT

## 2025-08-21 PROCEDURE — 36415 COLL VENOUS BLD VENIPUNCTURE: CPT

## 2025-08-21 PROCEDURE — 87150 DNA/RNA AMPLIFIED PROBE: CPT

## 2025-08-21 PROCEDURE — 83735 ASSAY OF MAGNESIUM: CPT

## 2025-08-21 PROCEDURE — 74177 CT ABD & PELVIS W/CONTRAST: CPT

## 2025-08-21 PROCEDURE — 99285 EMERGENCY DEPT VISIT HI MDM: CPT

## 2025-08-21 PROCEDURE — 85025 COMPLETE CBC W/AUTO DIFF WBC: CPT

## 2025-08-21 PROCEDURE — 87077 CULTURE AEROBIC IDENTIFY: CPT

## 2025-08-21 RX ORDER — 0.9 % SODIUM CHLORIDE 0.9 %
1000 INTRAVENOUS SOLUTION INTRAVENOUS ONCE
Status: DISCONTINUED | OUTPATIENT
Start: 2025-08-21 | End: 2025-08-22 | Stop reason: HOSPADM

## 2025-08-21 RX ORDER — IOPAMIDOL 755 MG/ML
75 INJECTION, SOLUTION INTRAVASCULAR
Status: COMPLETED | OUTPATIENT
Start: 2025-08-21 | End: 2025-08-21

## 2025-08-21 RX ADMIN — IOPAMIDOL 75 ML: 755 INJECTION, SOLUTION INTRAVENOUS at 23:36

## 2025-08-21 ASSESSMENT — PAIN SCALES - GENERAL: PAINLEVEL_OUTOF10: 10

## 2025-08-21 ASSESSMENT — PAIN DESCRIPTION - LOCATION: LOCATION: ABDOMEN

## 2025-08-21 ASSESSMENT — PAIN - FUNCTIONAL ASSESSMENT: PAIN_FUNCTIONAL_ASSESSMENT: 0-10

## 2025-08-22 VITALS
TEMPERATURE: 99 F | WEIGHT: 144.4 LBS | BODY MASS INDEX: 24.79 KG/M2 | SYSTOLIC BLOOD PRESSURE: 114 MMHG | RESPIRATION RATE: 17 BRPM | OXYGEN SATURATION: 97 % | DIASTOLIC BLOOD PRESSURE: 63 MMHG | HEART RATE: 63 BPM

## 2025-08-22 PROCEDURE — 6360000002 HC RX W HCPCS: Performed by: NURSE PRACTITIONER

## 2025-08-22 PROCEDURE — 2500000003 HC RX 250 WO HCPCS: Performed by: NURSE PRACTITIONER

## 2025-08-22 PROCEDURE — 96375 TX/PRO/DX INJ NEW DRUG ADDON: CPT

## 2025-08-22 PROCEDURE — 96374 THER/PROPH/DIAG INJ IV PUSH: CPT

## 2025-08-22 RX ORDER — CEFUROXIME AXETIL 500 MG/1
500 TABLET ORAL 2 TIMES DAILY
Qty: 14 TABLET | Refills: 0 | Status: SHIPPED | OUTPATIENT
Start: 2025-08-22 | End: 2025-08-29

## 2025-08-22 RX ORDER — MORPHINE SULFATE 4 MG/ML
4 INJECTION, SOLUTION INTRAMUSCULAR; INTRAVENOUS ONCE
Refills: 0 | Status: COMPLETED | OUTPATIENT
Start: 2025-08-22 | End: 2025-08-22

## 2025-08-22 RX ADMIN — WATER 1000 MG: 1 INJECTION INTRAMUSCULAR; INTRAVENOUS; SUBCUTANEOUS at 00:20

## 2025-08-22 RX ADMIN — Medication 1000 ML: at 00:20

## 2025-08-22 RX ADMIN — MORPHINE SULFATE 4 MG: 4 INJECTION, SOLUTION INTRAMUSCULAR; INTRAVENOUS at 03:29

## 2025-08-22 ASSESSMENT — ENCOUNTER SYMPTOMS
COLOR CHANGE: 0
ABDOMINAL PAIN: 1
VOMITING: 0
SHORTNESS OF BREATH: 0
BACK PAIN: 0
DIARRHEA: 0
NAUSEA: 0

## 2025-08-22 ASSESSMENT — PAIN SCALES - GENERAL: PAINLEVEL_OUTOF10: 8

## 2025-08-22 ASSESSMENT — PAIN - FUNCTIONAL ASSESSMENT: PAIN_FUNCTIONAL_ASSESSMENT: 0-10

## 2025-08-24 LAB
BACTERIA UR CULT: ABNORMAL
BACTERIA UR CULT: ABNORMAL
ORGANISM: ABNORMAL
ORGANISM: ABNORMAL

## 2025-08-25 LAB
BACTERIA UR CULT: ABNORMAL
BACTERIA UR CULT: ABNORMAL
CARBAPENEMASE RESISTANCE GENES PANEL BY MOLECULAR GENETICS METHOD: NORMAL
ORGANISM: ABNORMAL
ORGANISM: ABNORMAL

## 2025-08-27 ENCOUNTER — HOSPITAL ENCOUNTER (EMERGENCY)
Age: 80
Discharge: HOME | End: 2025-08-28
Attending: EMERGENCY MEDICINE
Payer: MEDICARE

## 2025-08-27 DIAGNOSIS — R33.9 URINARY RETENTION: Primary | ICD-10-CM

## 2025-08-27 LAB
BASOPHILS # BLD: 0 K/UL (ref 0–0.2)
BASOPHILS NFR BLD: 0.5 %
DEPRECATED RDW RBC AUTO: 16.1 % (ref 12.4–15.4)
EOSINOPHIL # BLD: 0.2 K/UL (ref 0–0.6)
EOSINOPHIL NFR BLD: 2.5 %
HCT VFR BLD AUTO: 28.8 % (ref 36–48)
HGB BLD-MCNC: 10 G/DL (ref 12–16)
LYMPHOCYTES # BLD: 1.8 K/UL (ref 1–5.1)
LYMPHOCYTES NFR BLD: 28.6 %
MCH RBC QN AUTO: 31 PG (ref 26–34)
MCHC RBC AUTO-ENTMCNC: 34.9 G/DL (ref 31–36)
MCV RBC AUTO: 88.9 FL (ref 80–100)
MONOCYTES # BLD: 0.6 K/UL (ref 0–1.3)
MONOCYTES NFR BLD: 9.2 %
NEUTROPHILS # BLD: 3.7 K/UL (ref 1.7–7.7)
NEUTROPHILS NFR BLD: 59.2 %
PLATELET # BLD AUTO: 338 K/UL (ref 135–450)
PMV BLD AUTO: 7.3 FL (ref 5–10.5)
RBC # BLD AUTO: 3.24 M/UL (ref 4–5.2)
WBC # BLD AUTO: 6.2 K/UL (ref 4–11)

## 2025-08-27 PROCEDURE — 85025 COMPLETE CBC W/AUTO DIFF WBC: CPT

## 2025-08-27 PROCEDURE — 51702 INSERT TEMP BLADDER CATH: CPT

## 2025-08-27 PROCEDURE — 81001 URINALYSIS AUTO W/SCOPE: CPT

## 2025-08-27 PROCEDURE — 51798 US URINE CAPACITY MEASURE: CPT

## 2025-08-27 PROCEDURE — 99284 EMERGENCY DEPT VISIT MOD MDM: CPT

## 2025-08-27 PROCEDURE — 80048 BASIC METABOLIC PNL TOTAL CA: CPT

## 2025-08-27 ASSESSMENT — PAIN SCALES - GENERAL: PAINLEVEL_OUTOF10: 0

## 2025-08-27 ASSESSMENT — PAIN - FUNCTIONAL ASSESSMENT: PAIN_FUNCTIONAL_ASSESSMENT: 0-10

## 2025-08-28 VITALS
RESPIRATION RATE: 14 BRPM | HEIGHT: 64 IN | SYSTOLIC BLOOD PRESSURE: 136 MMHG | HEART RATE: 68 BPM | DIASTOLIC BLOOD PRESSURE: 67 MMHG | OXYGEN SATURATION: 99 % | WEIGHT: 152.2 LBS | BODY MASS INDEX: 25.99 KG/M2 | TEMPERATURE: 98.5 F

## 2025-08-28 LAB
ANION GAP SERPL CALCULATED.3IONS-SCNC: 10 MMOL/L (ref 3–16)
BACTERIA URNS QL MICRO: ABNORMAL /HPF
BILIRUB UR QL STRIP.AUTO: NEGATIVE
BUN SERPL-MCNC: 19 MG/DL (ref 7–20)
CALCIUM SERPL-MCNC: 9.7 MG/DL (ref 8.3–10.6)
CHLORIDE SERPL-SCNC: 89 MMOL/L (ref 99–110)
CLARITY UR: ABNORMAL
CO2 SERPL-SCNC: 25 MMOL/L (ref 21–32)
COLOR UR: YELLOW
CREAT SERPL-MCNC: 0.3 MG/DL (ref 0.6–1.2)
EPI CELLS #/AREA URNS AUTO: 0 /HPF (ref 0–5)
GFR SERPLBLD CREATININE-BSD FMLA CKD-EPI: >90 ML/MIN/{1.73_M2}
GLUCOSE SERPL-MCNC: 102 MG/DL (ref 70–99)
GLUCOSE UR STRIP.AUTO-MCNC: NEGATIVE MG/DL
HGB UR QL STRIP.AUTO: NEGATIVE
HYALINE CASTS #/AREA URNS AUTO: 1 /LPF (ref 0–8)
KETONES UR STRIP.AUTO-MCNC: NEGATIVE MG/DL
LEUKOCYTE ESTERASE UR QL STRIP.AUTO: ABNORMAL
NITRITE UR QL STRIP.AUTO: NEGATIVE
PH UR STRIP.AUTO: 8 [PH] (ref 5–8)
POTASSIUM SERPL-SCNC: 4.5 MMOL/L (ref 3.5–5.1)
PROT UR STRIP.AUTO-MCNC: NEGATIVE MG/DL
RBC CLUMPS #/AREA URNS AUTO: 0 /HPF (ref 0–4)
SODIUM SERPL-SCNC: 124 MMOL/L (ref 136–145)
SP GR UR STRIP.AUTO: 1.01 (ref 1–1.03)
UA COMPLETE W REFLEX CULTURE PNL UR: ABNORMAL
UA DIPSTICK W REFLEX MICRO PNL UR: YES
URN SPEC COLLECT METH UR: ABNORMAL
UROBILINOGEN UR STRIP-ACNC: 0.2 E.U./DL
WBC #/AREA URNS AUTO: 1 /HPF (ref 0–5)

## 2025-08-28 PROCEDURE — 2580000003 HC RX 258: Performed by: EMERGENCY MEDICINE

## 2025-08-28 RX ORDER — 0.9 % SODIUM CHLORIDE 0.9 %
500 INTRAVENOUS SOLUTION INTRAVENOUS ONCE
Status: COMPLETED | OUTPATIENT
Start: 2025-08-28 | End: 2025-08-28

## 2025-08-28 RX ADMIN — SODIUM CHLORIDE 500 ML: 0.9 INJECTION, SOLUTION INTRAVENOUS at 01:04

## (undated) DEVICE — ELECTRODE PT RET AD L9FT HI MOIST COND ADH HYDRGEL CORDED

## (undated) DEVICE — INTENDED FOR TISSUE SEPARATION, AND OTHER PROCEDURES THAT REQUIRE A SHARP SURGICAL BLADE TO PUNCTURE OR CUT.: Brand: BARD-PARKER ® STAINLESS STEEL BLADES

## (undated) DEVICE — UNDERPAD INCONT XL MESH PROTCT + DISP FOR MAT USE

## (undated) DEVICE — SOLUTION PREP PAINT POV IOD FOR SKIN MUCOUS MEM

## (undated) DEVICE — GLOVE ORANGE PI 7 1/2   MSG9075

## (undated) DEVICE — MERCY HEALTH WEST TURNOVER: Brand: MEDLINE INDUSTRIES, INC.

## (undated) DEVICE — SPONGE GZ W4XL4IN COT 12 PLY TYP VII WVN C FLD DSGN STERILE

## (undated) DEVICE — GLOVE SURG SZ 8 L12IN FNGR THK79MIL GRN LTX FREE

## (undated) DEVICE — GOWN,AURORA,NONREINF,RAGLAN,XXL,STERILE: Brand: MEDLINE

## (undated) DEVICE — MINOR SET UP: Brand: MEDLINE INDUSTRIES, INC.

## (undated) DEVICE — SOLUTION IRRIG 1000ML 09% SOD CHL USP PIC PLAS CONTAINER

## (undated) DEVICE — SUTURE VICRYL + SZ 3-0 L27IN ABSRB UD L26MM SH 1/2 CIR VCP416H

## (undated) DEVICE — ENDOSCOPY KIT: Brand: MEDLINE INDUSTRIES, INC.

## (undated) DEVICE — FORCEPS BX 240CM 2.4MM L NDL RAD JAW 4 M00513334

## (undated) DEVICE — PAD,ABDOMINAL,8"X7.5",STERILE,LF,1/PK: Brand: MEDLINE

## (undated) DEVICE — PREMIUM DRY TRAY LF: Brand: MEDLINE INDUSTRIES, INC.

## (undated) DEVICE — NEEDLE HYPO 25GA L1.5IN BVL ORIENTED ECLIPSE

## (undated) DEVICE — BITE BLOCK ENDOSCP AD 60 FR W/ ADJ STRP PLAS GRN BLOX

## (undated) DEVICE — FORMALIN CLEAR VIAL 20 ML 10%